# Patient Record
Sex: MALE | Race: ASIAN | NOT HISPANIC OR LATINO | Employment: FULL TIME | URBAN - METROPOLITAN AREA
[De-identification: names, ages, dates, MRNs, and addresses within clinical notes are randomized per-mention and may not be internally consistent; named-entity substitution may affect disease eponyms.]

---

## 2018-06-22 ENCOUNTER — OFFICE VISIT (OUTPATIENT)
Dept: MEDICAL GROUP | Facility: PHYSICIAN GROUP | Age: 58
End: 2018-06-22
Payer: COMMERCIAL

## 2018-06-22 VITALS
HEIGHT: 64 IN | WEIGHT: 180 LBS | SYSTOLIC BLOOD PRESSURE: 150 MMHG | DIASTOLIC BLOOD PRESSURE: 74 MMHG | OXYGEN SATURATION: 97 % | RESPIRATION RATE: 14 BRPM | TEMPERATURE: 97 F | HEART RATE: 91 BPM | BODY MASS INDEX: 30.73 KG/M2

## 2018-06-22 DIAGNOSIS — Z12.12 SCREENING FOR COLORECTAL CANCER: ICD-10-CM

## 2018-06-22 DIAGNOSIS — R07.82 INTERCOSTAL PAIN: ICD-10-CM

## 2018-06-22 DIAGNOSIS — Z12.11 SCREENING FOR COLORECTAL CANCER: ICD-10-CM

## 2018-06-22 DIAGNOSIS — I10 ESSENTIAL HYPERTENSION: ICD-10-CM

## 2018-06-22 DIAGNOSIS — E66.9 OBESITY (BMI 30-39.9): ICD-10-CM

## 2018-06-22 DIAGNOSIS — L65.9 HAIR LOSS: ICD-10-CM

## 2018-06-22 DIAGNOSIS — F17.200 SMOKER: ICD-10-CM

## 2018-06-22 PROCEDURE — 93000 ELECTROCARDIOGRAM COMPLETE: CPT | Performed by: INTERNAL MEDICINE

## 2018-06-22 PROCEDURE — 99204 OFFICE O/P NEW MOD 45 MIN: CPT | Performed by: INTERNAL MEDICINE

## 2018-06-22 RX ORDER — VERAPAMIL HYDROCHLORIDE 360 MG/1
360 CAPSULE, DELAYED RELEASE PELLETS ORAL DAILY
COMMUNITY
End: 2018-06-22

## 2018-06-22 RX ORDER — LISINOPRIL 10 MG/1
10 TABLET ORAL DAILY
Qty: 30 TAB | Refills: 3 | Status: SHIPPED | OUTPATIENT
Start: 2018-06-22 | End: 2018-09-10

## 2018-06-22 ASSESSMENT — PATIENT HEALTH QUESTIONNAIRE - PHQ9: CLINICAL INTERPRETATION OF PHQ2 SCORE: 0

## 2018-06-22 NOTE — PROGRESS NOTES
New Patient to Establish    Reason to establish: hair loss. Lab work, hypertension, chest pain     Cheikh Chacko is a 58 y.o. male here today for evaluation and management of:    Intercostal pain  Pt reports chest pain sharp 1-2/10, intermittent, in rest or during exertion. No radiation. Sometimes associated with sob. He drinks water and he feels better. He is a smoker. He takes asa for chest pain, and seems to help. He denies sweats, palpitation, n/v. This is been going on for few months. He has not seen doctor, because pain is very mild.     Hair loss  His wife reports of some patches of hair loss. There is certain area of the scalp that has more hair loss then other, but no true patches. No signs of folliculitis of skin irritation. Itchy sometimes. No dandruff .     Essential hypertension  He tells me that he has been off verapamil for 5 months. Last time he has seen physician is 4-5 years ago. He is not sure why he was placed on verapamil. He denies arhythmia problems. He was on that medication for long time.     Smoker  Counseling provided. His wife smoke too. He refuses chantix and smoking cessation class. They will try together to quit smoking.     Obesity (BMI 30-39.9)  Problem is big portion. He admits he is not very active. Counseling for a small portion, balanced diet, exercising for3-5 times per week.        Past Medical History:   Diagnosis Date   • Hyperlipidemia    • Hypertension        Current Outpatient Prescriptions   Medication Sig Dispense Refill   • lisinopril (PRINIVIL) 10 MG Tab Take 1 Tab by mouth every day. 30 Tab 3     No current facility-administered medications for this visit.        Allergies as of 06/22/2018   • (No Known Allergies)       Social History     Social History   • Marital status:      Spouse name: N/A   • Number of children: N/A   • Years of education: N/A     Occupational History   • Not on file.     Social History Main Topics   • Smoking status: Current Every Day  "Smoker     Packs/day: 1.00     Years: 20.00     Types: Cigarettes   • Smokeless tobacco: Never Used   • Alcohol use Yes      Comment: occasionally    • Drug use: No   • Sexual activity: Yes     Partners: Female      Comment: two children, and two step children      Other Topics Concern   • Not on file     Social History Narrative   • No narrative on file       Family History   Problem Relation Age of Onset   • Diabetes Sister    • Diabetes Brother    • Cancer Neg Hx    • Heart Disease Neg Hx    • Stroke Neg Hx    • Alcohol/Drug Neg Hx        History reviewed. No pertinent surgical history.    ROS: All systems reviewed are negative except for HPI    /74   Pulse 91   Temp 36.1 °C (97 °F)   Resp 14   Ht 1.626 m (5' 4\")   Wt 81.6 kg (180 lb)   SpO2 97%   BMI 30.90 kg/m²     Physical Exam  General:  Alert and oriented, No apparent distress.  Eyes: Pupils equal and reactive. No scleral icterus. EOMI  Throat: Clear no erythema or exudates noted. Oral mucosa moist, oral dental intact  Neck: Supple. No cervical or supraclavicular lymphadenopathy noted. Thyroid not enlarged.  Lungs: normal effort,  Clear to auscultation  Cardiovascular: Regular rate and rhythm. No murmurs, rubs or gallops, pulses intact   Abdomen:  Soft, +BS, no tenderness. No rebound or guarding noted. No hepato or splenomegaly   Extremities: No clubbing, cyanosis, edema.  Neuro: cranial nerves intact, sensation intact   Muscle skeletal: no back tenderness   Skin: Clear. No rash or suspicious skin lesions noted. See above     Assessment and Plan    1. Essential hypertension  I discuss with pt , there is no benefits of verapamil for hypertension. I will switch pt to lisinopril and I will bring him for one month follow up to continue monitoring. He denies symptoms of hypertension. I discuss side effects as dry cough  - COMP METABOLIC PANEL; Future  - LIPID PROFILE; Future  - MICROALBUMIN CREAT RATIO URINE; Future  - lisinopril (PRINIVIL) 10 MG " Tab; Take 1 Tab by mouth every day.  Dispense: 30 Tab; Refill: 3    2. Hair loss  Thyroid to follow. Seems more age related hair loss. No true infection. Consider dermatology referral if worse   - TSH WITH REFLEX TO FT4; Future    3. Intercostal pain  Atypical chest pain. EKG my personal reading and interpretation:  Sinus rhythm, HR 75, SD < 200 ms, QRS < 120 ms, normal axis deviation, J point elevation on anterior leads, left ventricular hypertrophy. No other significant ST changes. No active chest pain now. I advise pt to call 911 if he starts to have severe chest pain, associated with n/v, dizziness, sweats, elephant sitting on his chest, radiation to left arm   - EKG; Future  - CBC WITH DIFFERENTIAL; Future  - REFERRAL TO CARDIOLOGY    4. Smoker  I urge pt for smoking cessation. Continue to monitor  - CBC WITH DIFFERENTIAL; Future    5. Screening for colorectal cancer  - OCCULT BLOOD FECES IMMUNOASSAY (FIT); Future    6. Obesity (BMI 30-39.9)  Counseling for a small portion, balanced diet, exercising for3-5 times per week.    - Patient identified as having weight management issue.  Appropriate orders and counseling given.      Followup: Return in about 4 weeks (around 7/20/2018), or if symptoms worsen or fail to improve, for Short.    Signed by: Ashu Brandt M.D.

## 2018-06-22 NOTE — ASSESSMENT & PLAN NOTE
Problem is big portion. He admits he is not very active. Counseling for a small portion, balanced diet, exercising for3-5 times per week.

## 2018-06-22 NOTE — ASSESSMENT & PLAN NOTE
He tells me that he has been off verapamil for 5 months. Last time he has seen physician is 4-5 years ago. He is not sure why he was placed on verapamil. He denies arhythmia problems. He was on that medication for long time.

## 2018-06-22 NOTE — LETTER
[facility]  Ashu Brandt M.D.  [address]  [place] [address]  Fax: [phone]   Authorization for Release/Disclosure of   Protected Health Information   Name: CHEIKH CHACKO : 1960 SSN: [id number]   Address: [facility] [address] [place] [address] Phone:    [phone] (home)    I authorize the entity listed below to release/disclose the PHI below to:   [facility]/Ashu Brandt M.D. and Ashu Brandt M.D.   Provider or Entity Name:     Address   City, State, Zip   Phone:      Fax:     Reason for request: continuity of care   Information to be released:    [  ] LAST COLONOSCOPY,  including any PATH REPORT and follow-up  [  ] LAST FIT/COLOGUARD RESULT [  ] LAST DEXA  [  ] LAST MAMMOGRAM  [  ] LAST PAP  [  ] LAST LABS [  ] RETINA EXAM REPORT  [  ] IMMUNIZATION RECORDS  [  ] Release all info      [  ] Check here and initial the line next to each item to release ALL health information INCLUDING  _____ Care and treatment for drug and / or alcohol abuse  _____ HIV testing, infection status, or AIDS  _____ Genetic Testing    DATES OF SERVICE OR TIME PERIOD TO BE DISCLOSED: _____________  I understand and acknowledge that:  * This Authorization may be revoked at any time by you in writing, except if your health information has already been used or disclosed.  * Your health information that will be used or disclosed as a result of you signing this authorization could be re-disclosed by the recipient. If this occurs, your re-disclosed health information may no longer be protected by State or Federal laws.  * You may refuse to sign this Authorization. Your refusal will not affect your ability to obtain treatment.  * This Authorization becomes effective upon signing and will  on (date) __________.      If no date is indicated, this Authorization will  one (1) year from the signature date.    Name: Cheikh Chacko    Signature:   Date:     2018       PLEASE FAX REQUESTED RECORDS BACK TO: [phone]  [phone]

## 2018-06-22 NOTE — ASSESSMENT & PLAN NOTE
Counseling provided. His wife smoke too. He refuses chantix and smoking cessation class. They will try together to quit smoking.

## 2018-06-22 NOTE — ASSESSMENT & PLAN NOTE
Pt reports chest pain sharp 1-2/10, intermittent, in rest or during exertion. No radiation. Sometimes associated with sob. He drinks water and he feels better. He is a smoker. He takes asa for chest pain, and seems to help. He denies sweats, palpitation, n/v. This is been going on for few months. He has not seen doctor, because pain is very mild.

## 2018-06-22 NOTE — ASSESSMENT & PLAN NOTE
His wife reports of some patches of hair loss. There is certain area of the scalp that has more hair loss then other, but no true patches. No signs of folliculitis of skin irritation. Itchy sometimes. No dandruff .

## 2018-07-19 ENCOUNTER — HOSPITAL ENCOUNTER (OUTPATIENT)
Dept: LAB | Facility: MEDICAL CENTER | Age: 58
End: 2018-07-19
Attending: INTERNAL MEDICINE
Payer: COMMERCIAL

## 2018-07-19 DIAGNOSIS — I10 ESSENTIAL HYPERTENSION: ICD-10-CM

## 2018-07-19 DIAGNOSIS — L65.9 HAIR LOSS: ICD-10-CM

## 2018-07-19 DIAGNOSIS — F17.200 SMOKER: ICD-10-CM

## 2018-07-19 DIAGNOSIS — R07.82 INTERCOSTAL PAIN: ICD-10-CM

## 2018-07-19 LAB
ALBUMIN SERPL BCP-MCNC: 4.8 G/DL (ref 3.2–4.9)
ALBUMIN/GLOB SERPL: 1.7 G/DL
ALP SERPL-CCNC: 49 U/L (ref 30–99)
ALT SERPL-CCNC: 35 U/L (ref 2–50)
ANION GAP SERPL CALC-SCNC: 9 MMOL/L (ref 0–11.9)
AST SERPL-CCNC: 26 U/L (ref 12–45)
BASOPHILS # BLD AUTO: 1.3 % (ref 0–1.8)
BASOPHILS # BLD: 0.09 K/UL (ref 0–0.12)
BILIRUB SERPL-MCNC: 0.9 MG/DL (ref 0.1–1.5)
BUN SERPL-MCNC: 18 MG/DL (ref 8–22)
CALCIUM SERPL-MCNC: 9.6 MG/DL (ref 8.5–10.5)
CHLORIDE SERPL-SCNC: 107 MMOL/L (ref 96–112)
CHOLEST SERPL-MCNC: 169 MG/DL (ref 100–199)
CO2 SERPL-SCNC: 24 MMOL/L (ref 20–33)
CREAT SERPL-MCNC: 1.07 MG/DL (ref 0.5–1.4)
CREAT UR-MCNC: 290.9 MG/DL
EOSINOPHIL # BLD AUTO: 0.19 K/UL (ref 0–0.51)
EOSINOPHIL NFR BLD: 2.8 % (ref 0–6.9)
ERYTHROCYTE [DISTWIDTH] IN BLOOD BY AUTOMATED COUNT: 41.2 FL (ref 35.9–50)
GLOBULIN SER CALC-MCNC: 2.9 G/DL (ref 1.9–3.5)
GLUCOSE SERPL-MCNC: 110 MG/DL (ref 65–99)
HCT VFR BLD AUTO: 48.2 % (ref 42–52)
HDLC SERPL-MCNC: 45 MG/DL
HGB BLD-MCNC: 16.7 G/DL (ref 14–18)
IMM GRANULOCYTES # BLD AUTO: 0.01 K/UL (ref 0–0.11)
IMM GRANULOCYTES NFR BLD AUTO: 0.1 % (ref 0–0.9)
LDLC SERPL CALC-MCNC: 98 MG/DL
LYMPHOCYTES # BLD AUTO: 2.57 K/UL (ref 1–4.8)
LYMPHOCYTES NFR BLD: 37.8 % (ref 22–41)
MCH RBC QN AUTO: 32.5 PG (ref 27–33)
MCHC RBC AUTO-ENTMCNC: 34.6 G/DL (ref 33.7–35.3)
MCV RBC AUTO: 93.8 FL (ref 81.4–97.8)
MICROALBUMIN UR-MCNC: 1.9 MG/DL
MICROALBUMIN/CREAT UR: 7 MG/G (ref 0–30)
MONOCYTES # BLD AUTO: 0.76 K/UL (ref 0–0.85)
MONOCYTES NFR BLD AUTO: 11.2 % (ref 0–13.4)
NEUTROPHILS # BLD AUTO: 3.18 K/UL (ref 1.82–7.42)
NEUTROPHILS NFR BLD: 46.8 % (ref 44–72)
NRBC # BLD AUTO: 0 K/UL
NRBC BLD-RTO: 0 /100 WBC
PLATELET # BLD AUTO: 195 K/UL (ref 164–446)
PMV BLD AUTO: 10.4 FL (ref 9–12.9)
POTASSIUM SERPL-SCNC: 4 MMOL/L (ref 3.6–5.5)
PROT SERPL-MCNC: 7.7 G/DL (ref 6–8.2)
RBC # BLD AUTO: 5.14 M/UL (ref 4.7–6.1)
SODIUM SERPL-SCNC: 140 MMOL/L (ref 135–145)
TRIGL SERPL-MCNC: 129 MG/DL (ref 0–149)
TSH SERPL DL<=0.005 MIU/L-ACNC: 2.42 UIU/ML (ref 0.38–5.33)
WBC # BLD AUTO: 6.8 K/UL (ref 4.8–10.8)

## 2018-07-19 PROCEDURE — 82043 UR ALBUMIN QUANTITATIVE: CPT

## 2018-07-19 PROCEDURE — 80053 COMPREHEN METABOLIC PANEL: CPT

## 2018-07-19 PROCEDURE — 82570 ASSAY OF URINE CREATININE: CPT

## 2018-07-19 PROCEDURE — 36415 COLL VENOUS BLD VENIPUNCTURE: CPT

## 2018-07-19 PROCEDURE — 84443 ASSAY THYROID STIM HORMONE: CPT

## 2018-07-19 PROCEDURE — 80061 LIPID PANEL: CPT

## 2018-07-19 PROCEDURE — 85025 COMPLETE CBC W/AUTO DIFF WBC: CPT

## 2018-07-20 NOTE — PROGRESS NOTES
Eddy Salamanca,   I just wanted to let you know that the labs look good. We can discuss at your next appointment.   Best,  Ashu Brandt M.D.

## 2018-07-21 ENCOUNTER — HOSPITAL ENCOUNTER (OUTPATIENT)
Facility: MEDICAL CENTER | Age: 58
End: 2018-07-21
Attending: INTERNAL MEDICINE
Payer: COMMERCIAL

## 2018-07-21 PROCEDURE — 82274 ASSAY TEST FOR BLOOD FECAL: CPT

## 2018-07-26 ENCOUNTER — APPOINTMENT (OUTPATIENT)
Dept: MEDICAL GROUP | Facility: PHYSICIAN GROUP | Age: 58
End: 2018-07-26
Payer: COMMERCIAL

## 2018-07-29 DIAGNOSIS — Z12.11 SCREENING FOR COLORECTAL CANCER: ICD-10-CM

## 2018-07-29 DIAGNOSIS — Z12.12 SCREENING FOR COLORECTAL CANCER: ICD-10-CM

## 2018-07-30 LAB — HEMOCCULT STL QL IA: NEGATIVE

## 2018-09-10 ENCOUNTER — OFFICE VISIT (OUTPATIENT)
Dept: MEDICAL GROUP | Facility: PHYSICIAN GROUP | Age: 58
End: 2018-09-10
Payer: COMMERCIAL

## 2018-09-10 VITALS
RESPIRATION RATE: 14 BRPM | DIASTOLIC BLOOD PRESSURE: 86 MMHG | OXYGEN SATURATION: 96 % | SYSTOLIC BLOOD PRESSURE: 152 MMHG | BODY MASS INDEX: 31.24 KG/M2 | HEIGHT: 64 IN | TEMPERATURE: 97.6 F | WEIGHT: 183 LBS | HEART RATE: 90 BPM

## 2018-09-10 DIAGNOSIS — Z23 NEED FOR VACCINATION: ICD-10-CM

## 2018-09-10 DIAGNOSIS — F17.200 SMOKER: ICD-10-CM

## 2018-09-10 DIAGNOSIS — R06.83 SNORING: ICD-10-CM

## 2018-09-10 DIAGNOSIS — I10 ESSENTIAL HYPERTENSION: ICD-10-CM

## 2018-09-10 DIAGNOSIS — L65.9 HAIR LOSS: ICD-10-CM

## 2018-09-10 DIAGNOSIS — E66.9 OBESITY (BMI 30-39.9): ICD-10-CM

## 2018-09-10 PROBLEM — R07.82 INTERCOSTAL PAIN: Status: RESOLVED | Noted: 2018-06-22 | Resolved: 2018-09-10

## 2018-09-10 PROCEDURE — 90472 IMMUNIZATION ADMIN EACH ADD: CPT | Performed by: INTERNAL MEDICINE

## 2018-09-10 PROCEDURE — 99214 OFFICE O/P EST MOD 30 MIN: CPT | Mod: 25 | Performed by: INTERNAL MEDICINE

## 2018-09-10 PROCEDURE — 90471 IMMUNIZATION ADMIN: CPT | Performed by: INTERNAL MEDICINE

## 2018-09-10 PROCEDURE — 90686 IIV4 VACC NO PRSV 0.5 ML IM: CPT | Performed by: INTERNAL MEDICINE

## 2018-09-10 PROCEDURE — 90732 PPSV23 VACC 2 YRS+ SUBQ/IM: CPT | Performed by: INTERNAL MEDICINE

## 2018-09-10 RX ORDER — KETOCONAZOLE 20 MG/ML
SHAMPOO TOPICAL
Qty: 1 BOTTLE | Refills: 0 | Status: SHIPPED | OUTPATIENT
Start: 2018-09-10 | End: 2019-01-15

## 2018-09-10 RX ORDER — LOSARTAN POTASSIUM AND HYDROCHLOROTHIAZIDE 12.5; 1 MG/1; MG/1
1 TABLET ORAL DAILY
Qty: 30 TAB | Refills: 3 | Status: SHIPPED | OUTPATIENT
Start: 2018-09-10 | End: 2018-10-31 | Stop reason: SDUPTHER

## 2018-09-10 NOTE — PROGRESS NOTES
Subjective:   Cheikh Chacko is a 58 y.o. male here today for hypertension, lab work review, snoring    Essential hypertension  BP elevated. I did recheck Bp and it was 160/90. He is on lisinopril which is causing him dry cough, but he is taking medication. He denies chest pain,sob, leg swelling, palpitation, blurred vision, lightheadedness, headache.     Hair loss  Slight better. Patches with hair loss. No erythema. Some hair is growing again. There is some white dry skin as, dandruff. I will try ketoconazole shampoo for trial. TSH normal      Obesity (BMI 30-39.9)  Counseling for a small portion, balanced diet, exercising for3-5 times per week.      Smoker  He is trying to cut on smoking. Counseling provided     Snoring  His wife reports loud snoring, and sometimes he stop breathing. He feels tired in the morning, easily naps during the day. His wife is concerned that he has JOSE. Mallampati score 3        Current medicines (including changes today)  Current Outpatient Prescriptions   Medication Sig Dispense Refill   • ketoconazole (NIZORAL) 2 % shampoo Apply to scalp every time washing hair 1 Bottle 0   • losartan-hydrochlorothiazide (HYZAAR) 100-12.5 MG per tablet Take 1 Tab by mouth every day. 30 Tab 3     No current facility-administered medications for this visit.      He  has a past medical history of Hyperlipidemia and Hypertension.    Current Outpatient Prescriptions   Medication Sig Dispense Refill   • ketoconazole (NIZORAL) 2 % shampoo Apply to scalp every time washing hair 1 Bottle 0   • losartan-hydrochlorothiazide (HYZAAR) 100-12.5 MG per tablet Take 1 Tab by mouth every day. 30 Tab 3     No current facility-administered medications for this visit.        Allergies as of 09/10/2018   • (No Known Allergies)       Social History     Social History   • Marital status:      Spouse name: N/A   • Number of children: N/A   • Years of education: N/A     Occupational History   • Not on file.     Social  "History Main Topics   • Smoking status: Current Every Day Smoker     Packs/day: 1.00     Years: 20.00     Types: Cigarettes   • Smokeless tobacco: Never Used   • Alcohol use Yes      Comment: occasionally    • Drug use: No   • Sexual activity: Yes     Partners: Female      Comment: two children, and two step children      Other Topics Concern   • Not on file     Social History Narrative   • No narrative on file        Family History   Problem Relation Age of Onset   • Diabetes Sister    • Diabetes Brother    • Cancer Neg Hx    • Heart Disease Neg Hx    • Stroke Neg Hx    • Alcohol/Drug Neg Hx        History reviewed. No pertinent surgical history.    ROS   All systems reviewed are negative except for HPI       Objective:     Blood pressure 152/86, pulse 90, temperature 36.4 °C (97.6 °F), resp. rate 14, height 1.626 m (5' 4\"), weight 83 kg (183 lb), SpO2 96 %. Body mass index is 31.41 kg/m².   Physical Exam:  Constitutional: Alert, no distress.  Skin: Warm, dry, good turgor, no rashes in visible areas.  Eye: Equal, round and reactive, conjunctiva clear, lids normal.  ENMT: Lips without lesions, good dentition, oropharynx clear.  Neck: Trachea midline, no masses, no thyromegaly. No cervical or supraclavicular lymphadenopathy  Respiratory: Unlabored respiratory effort, lungs clear to auscultation, no wheezes, no ronchi.  Cardiovascular: Normal S1, S2, no murmur, no edema.  Abdomen: Soft, non-tender, no masses, no hepatosplenomegaly.  Psych: Alert and oriented x3, normal affect and mood.        Assessment and Plan:   The following treatment plan was discussed    1. Need for vaccination  - INFLUENZA VACCINE QUAD INJ >3Y(PF)  - PNEUMOCOCCAL POLYSACCHARIDE VACCINE 23-VALENT =>1YO SQ/IM    2. Essential hypertension  We will switch patient to losartan combination with hydrochlorothiazide.  He is having dry cough from lisinopril.  Advised patient to continue monitoring.  There is no need to send patient to the emergency room " at this point.  Reevaluate in 1 month.  Sleep studies to follow-up which may be complement reason why his blood pressure is uncontrolled.  - losartan-hydrochlorothiazide (HYZAAR) 100-12.5 MG per tablet; Take 1 Tab by mouth every day.  Dispense: 30 Tab; Refill: 3    3. Hair loss  Ketoconazole shampoo trial   - ketoconazole (NIZORAL) 2 % shampoo; Apply to scalp every time washing hair  Dispense: 1 Bottle; Refill: 0    4. Smoker  Counseling provided. Pt is working on cessation     5. Obesity (BMI 30-39.9)  Counseling for a small portion, balanced diet, exercising for3-5 times per week.    6. Snoring  Sleep studies to follow   - REFERRAL TO SLEEP STUDIES      Followup: Return in about 4 weeks (around 10/8/2018), or if symptoms worsen or fail to improve, for Short.

## 2018-09-18 ENCOUNTER — OFFICE VISIT (OUTPATIENT)
Dept: CARDIOLOGY | Facility: MEDICAL CENTER | Age: 58
End: 2018-09-18
Payer: COMMERCIAL

## 2018-09-18 VITALS
OXYGEN SATURATION: 96 % | SYSTOLIC BLOOD PRESSURE: 132 MMHG | WEIGHT: 179 LBS | BODY MASS INDEX: 30.56 KG/M2 | HEIGHT: 64 IN | DIASTOLIC BLOOD PRESSURE: 84 MMHG | HEART RATE: 90 BPM

## 2018-09-18 DIAGNOSIS — Z79.899 HIGH RISK MEDICATION USE: ICD-10-CM

## 2018-09-18 DIAGNOSIS — I10 HTN (HYPERTENSION), MALIGNANT: ICD-10-CM

## 2018-09-18 LAB — EKG IMPRESSION: NORMAL

## 2018-09-18 PROCEDURE — 99214 OFFICE O/P EST MOD 30 MIN: CPT | Performed by: INTERNAL MEDICINE

## 2018-09-18 PROCEDURE — 93000 ELECTROCARDIOGRAM COMPLETE: CPT | Performed by: INTERNAL MEDICINE

## 2018-09-18 ASSESSMENT — ENCOUNTER SYMPTOMS
PALPITATIONS: 0
BLURRED VISION: 0
SENSORY CHANGE: 0
FEVER: 0
EYE PAIN: 0
NAUSEA: 0
BLOOD IN STOOL: 0
SHORTNESS OF BREATH: 0
MYALGIAS: 0
HALLUCINATIONS: 0
ORTHOPNEA: 0
DIZZINESS: 0
DOUBLE VISION: 0
FALLS: 0
SPEECH CHANGE: 0
CLAUDICATION: 0
WEIGHT LOSS: 0
LOSS OF CONSCIOUSNESS: 0
VOMITING: 0
EYE DISCHARGE: 0
ABDOMINAL PAIN: 0
DEPRESSION: 0
COUGH: 0
HEADACHES: 0
PND: 0
BRUISES/BLEEDS EASILY: 0
CHILLS: 0

## 2018-09-18 NOTE — LETTER
[facility]   [address]   [address]   [place] [address]  Phone: [phone]  Fax: [phone]              Cheikh Chacko  1960    Encounter Date: 9/18/2018    Donald Rondon M.D.          PROGRESS NOTE:  Chief Complaint   Patient presents with   • HTN (Controlled)   • Chest Pain       Subjective:   Cheikh Chacko is a 58 y.o. male who presents today for cardiac care and evaluation due to abnormal EKG, HTN.    Cheikh Chacko does not have any history of heart attack arrhythmias in the past. he never had transthoracic echocardiogram, cardiac catheterization or ablations procedure in the past. At this time, he denies having chest pain shortness of breath presyncopal syncopal episodes. he is able to exercise with walking for one to 2 miles without having problems of chest pain or shortness of breath. Patient is also able to climb up at least 2 flights of stairs without having symptoms.    I have independently reviewed patient's ECG with patient in clinic today, which shows normal sinus rhythm, normal MT, QT intervals. No evidence of acute coronary syndrome.    Past Medical History:   Diagnosis Date   • Hyperlipidemia    • Hypertension      History reviewed. No pertinent surgical history.  Family History   Problem Relation Age of Onset   • Diabetes Sister    • Diabetes Brother    • Cancer Neg Hx    • Heart Disease Neg Hx    • Stroke Neg Hx    • Alcohol/Drug Neg Hx      Social History     Social History   • Marital status:      Spouse name: N/A   • Number of children: N/A   • Years of education: N/A     Occupational History   • Not on file.     Social History Main Topics   • Smoking status: Current Every Day Smoker     Packs/day: 1.00     Years: 20.00     Types: Cigarettes   • Smokeless tobacco: Never Used   • Alcohol use Yes      Comment: occasionally    • Drug use: No   • Sexual activity: Yes     Partners: Female      Comment: two  "children, and two step children      Other Topics Concern   • Not on file     Social History Narrative   • No narrative on file     No Known Allergies  Outpatient Encounter Prescriptions as of 9/18/2018   Medication Sig Dispense Refill   • ketoconazole (NIZORAL) 2 % shampoo Apply to scalp every time washing hair 1 Bottle 0   • losartan-hydrochlorothiazide (HYZAAR) 100-12.5 MG per tablet Take 1 Tab by mouth every day. 30 Tab 3     No facility-administered encounter medications on file as of 9/18/2018.      Review of Systems   Constitutional: Negative for chills, fever, malaise/fatigue and weight loss.   HENT: Negative for ear discharge, ear pain, hearing loss and nosebleeds.    Eyes: Negative for blurred vision, double vision, pain and discharge.   Respiratory: Negative for cough and shortness of breath.    Cardiovascular: Negative for chest pain, palpitations, orthopnea, claudication, leg swelling and PND.   Gastrointestinal: Negative for abdominal pain, blood in stool, melena, nausea and vomiting.   Genitourinary: Negative for dysuria and hematuria.   Musculoskeletal: Negative for falls, joint pain and myalgias.   Skin: Negative for itching and rash.   Neurological: Negative for dizziness, sensory change, speech change, loss of consciousness and headaches.   Endo/Heme/Allergies: Negative for environmental allergies. Does not bruise/bleed easily.   Psychiatric/Behavioral: Negative for depression, hallucinations and suicidal ideas.        Objective:   /84   Pulse 90   Ht 1.626 m (5' 4\")   Wt 81.2 kg (179 lb)   SpO2 96%   BMI 30.73 kg/m²      Physical Exam   Constitutional: He is oriented to person, place, and time. No distress.   HENT:   Head: Normocephalic and atraumatic.   Right Ear: External ear normal.   Left Ear: External ear normal.   Eyes: Right eye exhibits no discharge. Left eye exhibits no discharge.   Neck: No JVD present. No thyromegaly present.   Cardiovascular: Normal rate, regular rhythm, " normal heart sounds and intact distal pulses.  Exam reveals no gallop and no friction rub.    No murmur heard.  Pulmonary/Chest: Breath sounds normal. No respiratory distress.   Abdominal: Bowel sounds are normal. He exhibits no distension. There is no tenderness.   Musculoskeletal: He exhibits no edema or tenderness.   Neurological: He is alert and oriented to person, place, and time. No cranial nerve deficit.   Skin: Skin is warm and dry. He is not diaphoretic.   Psychiatric: He has a normal mood and affect. His behavior is normal.   Nursing note and vitals reviewed.      Assessment:     1. HTN (hypertension), malignant  ECHOCARDIOGRAM COMP W/O CONT   2. High risk medication use  ECHOCARDIOGRAM COMP W/O CONT       Medical Decision Making:  Today's Assessment / Status / Plan:   Blood pressure is well controlled.  Cont current medications at current dose.   I will order transthoracic echocardiogram to assess for structural abnormalities.    I will see patient back in clinic with lab tests and studies results in 6 months.    I thank you Dr. Brandt for referring patient to our Cardiology Clinic today.        Ashu Brandt M.D.  [address]  [place] [address]  VIA In Basket

## 2018-09-18 NOTE — PROGRESS NOTES
Chief Complaint   Patient presents with   • HTN (Controlled)   • Chest Pain       Subjective:   Cheikh Chacko is a 58 y.o. male who presents today for cardiac care and evaluation due to abnormal EKG, HTN.    Cheikh Chacko does not have any history of heart attack arrhythmias in the past. he never had transthoracic echocardiogram, cardiac catheterization or ablations procedure in the past. At this time, he denies having chest pain shortness of breath presyncopal syncopal episodes. he is able to exercise with walking for one to 2 miles without having problems of chest pain or shortness of breath. Patient is also able to climb up at least 2 flights of stairs without having symptoms.    I have independently reviewed patient's ECG with patient in clinic today, which shows normal sinus rhythm, normal CA, QT intervals. No evidence of acute coronary syndrome.    Past Medical History:   Diagnosis Date   • Hyperlipidemia    • Hypertension      History reviewed. No pertinent surgical history.  Family History   Problem Relation Age of Onset   • Diabetes Sister    • Diabetes Brother    • Cancer Neg Hx    • Heart Disease Neg Hx    • Stroke Neg Hx    • Alcohol/Drug Neg Hx      Social History     Social History   • Marital status:      Spouse name: N/A   • Number of children: N/A   • Years of education: N/A     Occupational History   • Not on file.     Social History Main Topics   • Smoking status: Current Every Day Smoker     Packs/day: 1.00     Years: 20.00     Types: Cigarettes   • Smokeless tobacco: Never Used   • Alcohol use Yes      Comment: occasionally    • Drug use: No   • Sexual activity: Yes     Partners: Female      Comment: two children, and two step children      Other Topics Concern   • Not on file     Social History Narrative   • No narrative on file     No Known Allergies  Outpatient Encounter Prescriptions as of 9/18/2018   Medication Sig Dispense Refill   • ketoconazole (NIZORAL) 2 % shampoo Apply to scalp  "every time washing hair 1 Bottle 0   • losartan-hydrochlorothiazide (HYZAAR) 100-12.5 MG per tablet Take 1 Tab by mouth every day. 30 Tab 3     No facility-administered encounter medications on file as of 9/18/2018.      Review of Systems   Constitutional: Negative for chills, fever, malaise/fatigue and weight loss.   HENT: Negative for ear discharge, ear pain, hearing loss and nosebleeds.    Eyes: Negative for blurred vision, double vision, pain and discharge.   Respiratory: Negative for cough and shortness of breath.    Cardiovascular: Negative for chest pain, palpitations, orthopnea, claudication, leg swelling and PND.   Gastrointestinal: Negative for abdominal pain, blood in stool, melena, nausea and vomiting.   Genitourinary: Negative for dysuria and hematuria.   Musculoskeletal: Negative for falls, joint pain and myalgias.   Skin: Negative for itching and rash.   Neurological: Negative for dizziness, sensory change, speech change, loss of consciousness and headaches.   Endo/Heme/Allergies: Negative for environmental allergies. Does not bruise/bleed easily.   Psychiatric/Behavioral: Negative for depression, hallucinations and suicidal ideas.        Objective:   /84   Pulse 90   Ht 1.626 m (5' 4\")   Wt 81.2 kg (179 lb)   SpO2 96%   BMI 30.73 kg/m²     Physical Exam   Constitutional: He is oriented to person, place, and time. No distress.   HENT:   Head: Normocephalic and atraumatic.   Right Ear: External ear normal.   Left Ear: External ear normal.   Eyes: Right eye exhibits no discharge. Left eye exhibits no discharge.   Neck: No JVD present. No thyromegaly present.   Cardiovascular: Normal rate, regular rhythm, normal heart sounds and intact distal pulses.  Exam reveals no gallop and no friction rub.    No murmur heard.  Pulmonary/Chest: Breath sounds normal. No respiratory distress.   Abdominal: Bowel sounds are normal. He exhibits no distension. There is no tenderness.   Musculoskeletal: He " exhibits no edema or tenderness.   Neurological: He is alert and oriented to person, place, and time. No cranial nerve deficit.   Skin: Skin is warm and dry. He is not diaphoretic.   Psychiatric: He has a normal mood and affect. His behavior is normal.   Nursing note and vitals reviewed.      Assessment:     1. HTN (hypertension), malignant  ECHOCARDIOGRAM COMP W/O CONT   2. High risk medication use  ECHOCARDIOGRAM COMP W/O CONT       Medical Decision Making:  Today's Assessment / Status / Plan:   Blood pressure is well controlled.  Cont current medications at current dose.   I will order transthoracic echocardiogram to assess for structural abnormalities.    I will see patient back in clinic with lab tests and studies results in 6 months.    I thank you Dr. Brandt for referring patient to our Cardiology Clinic today.

## 2018-10-09 ENCOUNTER — APPOINTMENT (OUTPATIENT)
Dept: MEDICAL GROUP | Facility: PHYSICIAN GROUP | Age: 58
End: 2018-10-09
Payer: COMMERCIAL

## 2018-10-31 DIAGNOSIS — I10 ESSENTIAL HYPERTENSION: ICD-10-CM

## 2018-10-31 RX ORDER — LOSARTAN POTASSIUM AND HYDROCHLOROTHIAZIDE 12.5; 1 MG/1; MG/1
1 TABLET ORAL DAILY
Qty: 30 TAB | Refills: 3 | Status: SHIPPED | OUTPATIENT
Start: 2018-10-31 | End: 2018-11-09 | Stop reason: SDUPTHER

## 2018-11-09 DIAGNOSIS — I10 ESSENTIAL HYPERTENSION: ICD-10-CM

## 2018-11-09 RX ORDER — LOSARTAN POTASSIUM AND HYDROCHLOROTHIAZIDE 12.5; 1 MG/1; MG/1
1 TABLET ORAL DAILY
Qty: 90 TAB | Refills: 3 | Status: SHIPPED | OUTPATIENT
Start: 2018-11-09 | End: 2019-01-15

## 2019-01-03 ENCOUNTER — APPOINTMENT (OUTPATIENT)
Dept: MEDICAL GROUP | Facility: PHYSICIAN GROUP | Age: 59
End: 2019-01-03
Payer: COMMERCIAL

## 2019-01-15 ENCOUNTER — OFFICE VISIT (OUTPATIENT)
Dept: MEDICAL GROUP | Facility: PHYSICIAN GROUP | Age: 59
End: 2019-01-15
Payer: COMMERCIAL

## 2019-01-15 VITALS
HEIGHT: 64 IN | DIASTOLIC BLOOD PRESSURE: 76 MMHG | OXYGEN SATURATION: 98 % | TEMPERATURE: 98.2 F | RESPIRATION RATE: 14 BRPM | SYSTOLIC BLOOD PRESSURE: 144 MMHG | HEART RATE: 86 BPM | WEIGHT: 181 LBS | BODY MASS INDEX: 30.9 KG/M2

## 2019-01-15 DIAGNOSIS — R06.83 SNORING: ICD-10-CM

## 2019-01-15 DIAGNOSIS — E66.9 OBESITY (BMI 30-39.9): ICD-10-CM

## 2019-01-15 DIAGNOSIS — F17.200 SMOKER: ICD-10-CM

## 2019-01-15 DIAGNOSIS — I10 ESSENTIAL HYPERTENSION: ICD-10-CM

## 2019-01-15 PROBLEM — L65.9 HAIR LOSS: Status: RESOLVED | Noted: 2018-06-22 | Resolved: 2019-01-15

## 2019-01-15 PROCEDURE — 99214 OFFICE O/P EST MOD 30 MIN: CPT | Performed by: INTERNAL MEDICINE

## 2019-01-15 RX ORDER — LOSARTAN POTASSIUM AND HYDROCHLOROTHIAZIDE 25; 100 MG/1; MG/1
1 TABLET ORAL DAILY
Qty: 90 TAB | Refills: 3 | Status: SHIPPED | OUTPATIENT
Start: 2019-01-15 | End: 2019-04-29 | Stop reason: SDUPTHER

## 2019-01-15 ASSESSMENT — PATIENT HEALTH QUESTIONNAIRE - PHQ9: CLINICAL INTERPRETATION OF PHQ2 SCORE: 0

## 2019-01-16 NOTE — ASSESSMENT & PLAN NOTE
Slight elevated. He is on losartan/HCTZ 100-12.5.  He is not checking blood pressure at home.  He denies chest pain, shortness of breath, leg swelling, blurry vision or lightheadedness. I did personally check BP and it was 140/88

## 2019-01-16 NOTE — ASSESSMENT & PLAN NOTE
Trying to quit. Counseling provided.  Denies chronic cough, shortness of breath, wheezing, chest pain

## 2019-01-16 NOTE — PROGRESS NOTES
Subjective:   Cheikh Chacko is a 58 y.o. male here today for hypertension, chronic medical    Essential hypertension  Slight elevated. He is on losartan/HCTZ 100-12.5.  He is not checking blood pressure at home.  He denies chest pain, shortness of breath, leg swelling, blurry vision or lightheadedness. I did personally check BP and it was 140/88    Smoker  Trying to quit. Counseling provided.  Denies chronic cough, shortness of breath, wheezing, chest pain    Obesity (BMI 30-39.9)  Counseling for a small portion, balanced diet, exercising for3-5 times per week.  '    Snoring  He is scheduled for sleep studies this week. Review at next apt        Current medicines (including changes today)  Current Outpatient Prescriptions   Medication Sig Dispense Refill   • losartan-hydrochlorothiazide (HYZAAR) 100-25 MG per tablet Take 1 Tab by mouth every day. 90 Tab 3     No current facility-administered medications for this visit.      He  has a past medical history of Hyperlipidemia and Hypertension.    Current Outpatient Prescriptions   Medication Sig Dispense Refill   • losartan-hydrochlorothiazide (HYZAAR) 100-25 MG per tablet Take 1 Tab by mouth every day. 90 Tab 3     No current facility-administered medications for this visit.        Allergies as of 01/15/2019   • (No Known Allergies)       Social History     Social History   • Marital status:      Spouse name: N/A   • Number of children: N/A   • Years of education: N/A     Occupational History   • Not on file.     Social History Main Topics   • Smoking status: Current Every Day Smoker     Packs/day: 1.00     Years: 20.00     Types: Cigarettes   • Smokeless tobacco: Never Used   • Alcohol use Yes      Comment: occasionally    • Drug use: No   • Sexual activity: Yes     Partners: Female      Comment: two children, and two step children      Other Topics Concern   • Not on file     Social History Narrative   • No narrative on file        Family History   Problem  "Relation Age of Onset   • Diabetes Sister    • Diabetes Brother    • Cancer Neg Hx    • Heart Disease Neg Hx    • Stroke Neg Hx    • Alcohol/Drug Neg Hx        No past surgical history on file.    ROS   All systems reviewed are negative except for HPI         Objective:     Blood pressure 144/76, pulse 86, temperature 36.8 °C (98.2 °F), temperature source Temporal, resp. rate 14, height 1.626 m (5' 4\"), weight 82.1 kg (181 lb), SpO2 98 %. Body mass index is 31.07 kg/m².   Physical Exam:  Constitutional: Alert, no distress.  Skin: Warm, dry, good turgor, no rashes in visible areas.  Eye: Equal, round and reactive, conjunctiva clear, lids normal.  ENMT: Lips without lesions, good dentition, oropharynx clear.  Neck: Trachea midline, no masses, no thyromegaly. No cervical or supraclavicular lymphadenopathy  Respiratory: Unlabored respiratory effort, lungs clear to auscultation, no wheezes, no ronchi.  Cardiovascular: Normal S1, S2, no murmur, no edema.  Abdomen: Soft, non-tender, no masses, no hepatosplenomegaly.  Psych: Alert and oriented x3, normal affect and mood.        Assessment and Plan:   The following treatment plan was discussed    1. Essential hypertension  I will include the increase hydrochlorothiazide to 25 mg daily.  Continue losartan 100 mg daily.  Reviewed lab work at next appointment.  Consider amlodipine 5 mg next appointment if blood pressure still elevated.  - losartan-hydrochlorothiazide (HYZAAR) 100-25 MG per tablet; Take 1 Tab by mouth every day.  Dispense: 90 Tab; Refill: 3    2. Smoker  Counseling provided     3. Obesity (BMI 30-39.9)  Counseling for a small portion, balanced diet, exercising for3-5 times per week.    4. Snoring  Follow up with sleep studies       Followup: Return in about 4 weeks (around 2/12/2019), or if symptoms worsen or fail to improve, for Short.         "

## 2019-01-25 PROBLEM — G47.33 OSA (OBSTRUCTIVE SLEEP APNEA): Status: ACTIVE | Noted: 2019-01-25

## 2019-01-25 NOTE — PROGRESS NOTES
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 for evaluation of possible obstructive sleep apnea.  Patient relates that he hadprior sleep that he is done x3, was diagnosed to have sleep apnea, but did not pursue treatment because of the high deductible.  He believes that he stopped breathing approximately 50 times possibly in the whole night but possibly per hour.    The patient briefly describes his sleep problem as \"snoring\" which began in his 30s in which she considers moderate in severity.  The patient has a regular bed partner.  He estimates his sleep latency to be about 30 minutes.  He usually goes to bed at 10 PM to 11 PM and gets up at 6 AM to 8 AM.  He may watch TV just prior to turn out the lights and attempting to go to sleep.  On the average she wakes up 1-2 times during the night and experiences one to 3 episodes of nocturia.  After really bad nights of snoring he has a severe sore throat.    His symptoms include sleepiness during the day, nonrestorative sleep, loud and disturbing snoring, leg twitching, teeth grinding, and unusual movements during sleep.  He may fall asleep accidentally when watching TV or reading a book.    Significant comorbidities and modifying factors include obesity, hypertension, tobacco abuse, and up-to-date with influenza vaccination.    Patient Active Problem List    Diagnosis Date Noted   • JOSE (obstructive sleep apnea) 01/25/2019   • Snoring 09/10/2018   • Essential hypertension 06/22/2018   • Smoker 06/22/2018   • Obesity (BMI 30-39.9) 06/22/2018       Past Medical History:   Diagnosis Date   • Hyperlipidemia    • Hypertension    • Sleep apnea         History reviewed. No pertinent surgical history.    Family History   Problem Relation Age of Onset   • Diabetes Sister    • Diabetes Brother    • Cancer Neg Hx    • Heart Disease Neg Hx    • Stroke Neg Hx    • Alcohol/Drug Neg Hx        Social History     Social " "History   • Marital status:      Spouse name: N/A   • Number of children: N/A   • Years of education: N/A     Occupational History   • Not on file.     Social History Main Topics   • Smoking status: Current Every Day Smoker     Packs/day: 1.00     Years: 30.00     Types: Cigarettes   • Smokeless tobacco: Never Used   • Alcohol use No   • Drug use: No   • Sexual activity: Yes     Partners: Female      Comment: two children, and two step children      Other Topics Concern   • Not on file     Social History Narrative   • No narrative on file       Current Outpatient Prescriptions   Medication Sig Dispense Refill   • losartan-hydrochlorothiazide (HYZAAR) 100-25 MG per tablet Take 1 Tab by mouth every day. 90 Tab 3     No current facility-administered medications for this visit.     \"CURRENT RX\"    ALLERGIES: Patient has no known allergies.    ROS    Constitutional: Denies fever, chills, sweats,  weight loss, fatigue.  Eyes: Denies vision loss, pain, drainage, double vision, wears glasses.  Ears/Nose/Mouth/Throat: Denies earache, difficulty hearing, rhinitis/nasal congestion, injury, recurrent sore throat, persistent hoarseness, decayed teeth/toothaches, ringing or buzzing in the ears.  Cardiovascular: Denies chest pain, tightness, palpitations, swelling in legs/feet, fainting, difficulty breathing when lying down but gets better when sitting up.   Respiratory: Denies shortness of breath, cough, sputum, wheezing, painful breathing, coughing up blood.   Sleep: per HPI  Gastrointestinal: Denies  difficulty swallowing, nausea, abdominal pain, diarrhea, constipation, heartburn.  Genitourinary: Denies  blood in urine, discharge, frequent urination.   Musculoskeletal: Positive for painful joints and sore muscles but denies back pain  Integumentary: Denies rashes, lumps, color changes.   Neurological: Denies frequent headaches,weakness, dizziness.    PHYSICAL EXAM  Obese    /84 (BP Location: Right arm, Patient " "Position: Sitting, BP Cuff Size: Adult)   Pulse 86   Resp 16   Ht 1.626 m (5' 4\")   Wt 82.1 kg (181 lb)   SpO2 97%   BMI 31.07 kg/m²   Appearance: Well-nourished, well-developed, no acute distress  Eyes:  PERRLA, EOMI.  Wearing glasses.  ENMT: without lesions, deformities;hearing grossly intact; tongue normal, posterior pharynx without erythema or exudate; Mallampati classification: 4  Neck: Supple, trachea midline, no masses  Respiratory effort:  No intercostal retractions or use of accessory muscles  Lung auscultation:  No wheezes rhonchi rubs or rales  Cardiac: No murmurs, rubs, or gallops; regular rhythm, normal rate; no edema  Abdomen:  No tenderness, no organomegaly.  Obese  Musculoskeletal:  Grossly normal; gait and station normal; digits and nails normal  Skin:  No rashes, petechiae, cyanosis  Neurologic: without focal signs; oriented to person, time, place, and purpose; judgement intact  Psychiatric:  No depression, anxiety, agitation        PROBLEMS:  1. JOSE (obstructive sleep apnea)    - zolpidem (AMBIEN) 5 MG Tab; Take 1 Tab by mouth at bedtime as needed for up to 3 doses. Take 1-3 tabs prn  Dispense: 3 Tab; Refill: 0  - Polysomnography, 4 or More; Future    2. Obesity (BMI 30-39.9)    - OBESITY COUNSELING (No Charge): Patient identified as having weight management issue.  Appropriate orders and counseling given.    3. Smoker    - REFERRAL TO TOBACCO CESSATION PROGRAM    4. Up to date with influenza vaccination      5. Essential hypertension        PLAN:   The patient has signs and symptoms consistent with obstructive sleep apnea hypopnea syndrome.  We will schedule him to have an attended nocturnal polysomnogram with a zolpidem assist pro re marbella.    The risks of untreated sleep apnea were discussed with the patient at length. Patients with JOSE are at increased risk of cardiovascular disease including coronary artery disease, systemic arterial hypertension, pulmonary arterial hypertension, cardiac " arrythmias, and stroke. JOSE patients have an increased risk of motor vehicle accidents, type 2 diabetes, chronic kidney disease, and non-alcoholic liver disease. The patient was advised to avoid driving a motor vehicle when drowsy.    Positive airway pressure, such as CPAP, is considered first-line and preferred therapy for sleep apnea and may reverse both symptoms and risks.    Have advised the patient to follow up with the appropriate healthcare practitioners for all other medical problems and issues.    Return for after sleep study.

## 2019-01-28 ENCOUNTER — SLEEP CENTER VISIT (OUTPATIENT)
Dept: SLEEP MEDICINE | Facility: MEDICAL CENTER | Age: 59
End: 2019-01-28
Payer: COMMERCIAL

## 2019-01-28 VITALS
WEIGHT: 181 LBS | RESPIRATION RATE: 16 BRPM | HEIGHT: 64 IN | OXYGEN SATURATION: 97 % | SYSTOLIC BLOOD PRESSURE: 122 MMHG | BODY MASS INDEX: 30.9 KG/M2 | DIASTOLIC BLOOD PRESSURE: 84 MMHG | HEART RATE: 86 BPM

## 2019-01-28 DIAGNOSIS — F17.200 SMOKER: ICD-10-CM

## 2019-01-28 DIAGNOSIS — E66.9 OBESITY (BMI 30-39.9): ICD-10-CM

## 2019-01-28 DIAGNOSIS — Z92.29 UP TO DATE WITH INFLUENZA VACCINATION: ICD-10-CM

## 2019-01-28 DIAGNOSIS — I10 ESSENTIAL HYPERTENSION: ICD-10-CM

## 2019-01-28 DIAGNOSIS — G47.33 OSA (OBSTRUCTIVE SLEEP APNEA): ICD-10-CM

## 2019-01-28 PROCEDURE — 99204 OFFICE O/P NEW MOD 45 MIN: CPT | Performed by: INTERNAL MEDICINE

## 2019-01-28 RX ORDER — ZOLPIDEM TARTRATE 5 MG/1
5 TABLET ORAL NIGHTLY PRN
Qty: 3 TAB | Refills: 0 | Status: SHIPPED | OUTPATIENT
Start: 2019-01-28 | End: 2019-02-26

## 2019-02-21 ENCOUNTER — SLEEP STUDY (OUTPATIENT)
Dept: SLEEP MEDICINE | Facility: MEDICAL CENTER | Age: 59
End: 2019-02-21
Attending: INTERNAL MEDICINE
Payer: COMMERCIAL

## 2019-02-21 DIAGNOSIS — G47.33 OSA (OBSTRUCTIVE SLEEP APNEA): ICD-10-CM

## 2019-02-21 PROCEDURE — 95810 POLYSOM 6/> YRS 4/> PARAM: CPT | Performed by: INTERNAL MEDICINE

## 2019-02-22 NOTE — PROCEDURES
Comments:  The patient underwent a diagnostic polysomnogram using the standard montage for measurement of parameters of sleep, respiratory events, movement abnormalities, and heart rate and rhythm.    A microphone was used to monitor snoring.  Interpretation:  Study start time was 10:38:23 PM.  Diagnostic recording time was 7h 8.5m with a total sleep time of 4h 21.5m resulting in a sleep efficiency of 61.03%%.    Sleep latency from the start of the study was 62 minutes and the latency from sleep to REM was 153 minutes.  In total,102  arousals were scored for an arousal index of 23.4.  Respiratory:  There were a total of 13 apneas consisting of 6 obstructive apneas, 0 mixed apneas, and 7 central apneas.  A total of 189 hypopneas were scored.  The apnea index was 2.98 per hour and the hypopnea index was 43.37 per hour resulting in an overall AHI of 46.35.  AHI during rem was 17.5 and AHI while supine was 79.71.  Oximetry:  There was a mean oxygen saturation of 94.0% with a minimum oxygen saturation of 77.0%.  Time spent with oxygen saturations below 89% was 21.7 minutes.  Cardiac:  The highest heart rate seen while awake was 105 BPM while the highest heart rate during sleep was 99 BPM with an average sleeping heart rate of 84 BPM.  Limb Movements:  There were a total of 20 PLMs during sleep, of which 9 were PLMS arousals.  This resulted in a PLMS index of 4.6 and a PLMS arousal index of 2.1.    The sleep efficiency was 61%.  Sleep architecture showed a lack of stage III sleep.  Sleep latency was prolonged at 62 minutes.  No EKG or EMG abnormalities were observed.    Once asleep frequent snoring was noted associated with obstructive respiratory events.  The overall AHI was 46/h comprised of 94% hypopneas.  There were associated oxygen desaturations for a total of 5.5 minutes, to a roxanne of 80%.    Split night criteria was not met.    Interpretation:  Severe obstructive sleep apnea syndrome, AHI 46/h, associated with  desaturations to a roxanne of 80%.    Recommendations:  Given the severity of sleep apnea, CPAP therapy is considered the optimal treatment.  A designated CPAP titration polysomnogram is advised.  Alternatively a trial on AutoPap could also be considered.  In general, weight loss, avoidance of alcohol, sedatives, and muscle relaxants, can be of added benefit.

## 2019-02-26 ENCOUNTER — OFFICE VISIT (OUTPATIENT)
Dept: MEDICAL GROUP | Facility: PHYSICIAN GROUP | Age: 59
End: 2019-02-26
Payer: COMMERCIAL

## 2019-02-26 VITALS
TEMPERATURE: 98.1 F | RESPIRATION RATE: 16 BRPM | HEART RATE: 100 BPM | SYSTOLIC BLOOD PRESSURE: 140 MMHG | OXYGEN SATURATION: 98 % | BODY MASS INDEX: 30.56 KG/M2 | DIASTOLIC BLOOD PRESSURE: 98 MMHG | WEIGHT: 179 LBS | HEIGHT: 64 IN

## 2019-02-26 DIAGNOSIS — L30.9 ECZEMA, UNSPECIFIED TYPE: ICD-10-CM

## 2019-02-26 DIAGNOSIS — I10 ESSENTIAL HYPERTENSION: ICD-10-CM

## 2019-02-26 DIAGNOSIS — L98.9 SKIN LESIONS: ICD-10-CM

## 2019-02-26 PROCEDURE — 99214 OFFICE O/P EST MOD 30 MIN: CPT | Performed by: INTERNAL MEDICINE

## 2019-02-26 RX ORDER — TRIAMCINOLONE ACETONIDE 1 MG/G
1 CREAM TOPICAL 2 TIMES DAILY
Qty: 1 TUBE | Refills: 0 | Status: SHIPPED | OUTPATIENT
Start: 2019-02-26 | End: 2019-04-02 | Stop reason: SDUPTHER

## 2019-02-26 ASSESSMENT — PAIN SCALES - GENERAL: PAINLEVEL: NO PAIN

## 2019-02-27 NOTE — PROGRESS NOTES
Subjective:   Cheikh Chacko is a 58 y.o. male here today for hypertension, skin lesion     Pt is here to follow up on hypertension.  Blood pressure was slightly elevated at his previous appointment..  I did increase hydrochlorothiazide to 25 mg daily.  He is also on losartan 100 mg daily.  Blood pressure still elevated today.  He is not checking blood pressure at home.  He denies chest pain, shortness of breath, leg swelling, palpitation, lightheadedness.  He refuses to add any other medication.  He would like to keep monitoring.  He believes blood pressure is elevated today because of him rushing to come to the clinic.  He denies any side effects from current regimen.  In addition patient shows me a few skin lesion on his back. he has had them for 5 months. He reports that they are itchy.  He denies changing detergent , soaps or shampoo. No fever. No one else with similar symptoms,. No fever. Lesions are slightly inflammatory, oval, papulosquamous on his back, big one is on the left low rib. Not blanchable. He had not tried anything       Current medicines (including changes today)  Current Outpatient Prescriptions   Medication Sig Dispense Refill   • triamcinolone acetonide (KENALOG) 0.1 % Cream Apply 1 Application to affected area(s) 2 times a day. 1 Tube 0   • losartan-hydrochlorothiazide (HYZAAR) 100-25 MG per tablet Take 1 Tab by mouth every day. 90 Tab 3     No current facility-administered medications for this visit.      He  has a past medical history of Hyperlipidemia; Hypertension; and Sleep apnea.    Current Outpatient Prescriptions   Medication Sig Dispense Refill   • triamcinolone acetonide (KENALOG) 0.1 % Cream Apply 1 Application to affected area(s) 2 times a day. 1 Tube 0   • losartan-hydrochlorothiazide (HYZAAR) 100-25 MG per tablet Take 1 Tab by mouth every day. 90 Tab 3     No current facility-administered medications for this visit.        Allergies as of 02/26/2019   • (No Known Allergies)  "      Social History     Social History   • Marital status:      Spouse name: N/A   • Number of children: N/A   • Years of education: N/A     Occupational History   • Not on file.     Social History Main Topics   • Smoking status: Current Every Day Smoker     Packs/day: 1.00     Years: 30.00     Types: Cigarettes   • Smokeless tobacco: Never Used   • Alcohol use No   • Drug use: No   • Sexual activity: Yes     Partners: Female      Comment: two children, and two step children      Other Topics Concern   • Not on file     Social History Narrative   • No narrative on file        Family History   Problem Relation Age of Onset   • Diabetes Sister    • Diabetes Brother    • Cancer Neg Hx    • Heart Disease Neg Hx    • Stroke Neg Hx    • Alcohol/Drug Neg Hx        History reviewed. No pertinent surgical history.    ROS   All systems reviewed are negative except for HPI     Objective:     Blood pressure 140/98, pulse 100, temperature 36.7 °C (98.1 °F), temperature source Temporal, resp. rate 16, height 1.626 m (5' 4.02\"), weight 81.2 kg (179 lb), SpO2 98 %. Body mass index is 30.71 kg/m².   Physical Exam:  Constitutional: Alert, no distress.  Skin: Warm, dry, good turgor, as per above   Eye: Equal, round and reactive, conjunctiva clear, lids normal.  ENMT: Lips without lesions, good dentition, oropharynx clear.  Neck: Trachea midline, no masses, no thyromegaly. No cervical or supraclavicular lymphadenopathy  Respiratory: Unlabored respiratory effort, lungs clear to auscultation, no wheezes, no ronchi.  Cardiovascular: Normal S1, S2, no murmur, no edema.  Abdomen: Soft, non-tender, no masses, no hepatosplenomegaly.  Psych: Alert and oriented x3, normal affect and mood.        Assessment and Plan:   The following treatment plan was discussed    1. Essential hypertension  Pt is hesitant to start any new medication. He would like to monitor at home. If still persitent at next apt. Consider to add amlodipine     2. " Eczema, unspecified type  3. Skin lesions  These are not typical for urticaria, or bed bugs, seems examathous patches, inflammatory, oval, papulosquamous lesions. Cannot rule out eczema, not typical for psoriasis. Possible pityriasis rosea ?? Not typical for cancer. Not typical for tinea corporis, because it is not clear in the center. Urgent derm referral for further eval and possible biopsy. Pt is a smoker   - triamcinolone acetonide (KENALOG) 0.1 % Cream; Apply 1 Application to affected area(s) 2 times a day.  Dispense: 1 Tube; Refill: 0  - REFERRAL TO DERMATOLOGY      Followup: Return in about 2 weeks (around 3/12/2019), or if symptoms worsen or fail to improve, for Short.

## 2019-03-29 ENCOUNTER — APPOINTMENT (OUTPATIENT)
Dept: MEDICAL GROUP | Facility: PHYSICIAN GROUP | Age: 59
End: 2019-03-29
Payer: COMMERCIAL

## 2019-04-02 ENCOUNTER — OFFICE VISIT (OUTPATIENT)
Dept: MEDICAL GROUP | Facility: PHYSICIAN GROUP | Age: 59
End: 2019-04-02
Payer: COMMERCIAL

## 2019-04-02 VITALS
DIASTOLIC BLOOD PRESSURE: 70 MMHG | BODY MASS INDEX: 30.54 KG/M2 | WEIGHT: 178 LBS | OXYGEN SATURATION: 96 % | TEMPERATURE: 98.4 F | SYSTOLIC BLOOD PRESSURE: 126 MMHG

## 2019-04-02 DIAGNOSIS — L98.9 SKIN LESIONS: ICD-10-CM

## 2019-04-02 DIAGNOSIS — I10 ESSENTIAL HYPERTENSION: ICD-10-CM

## 2019-04-02 DIAGNOSIS — F17.200 SMOKER: ICD-10-CM

## 2019-04-02 DIAGNOSIS — E66.9 OBESITY (BMI 30-39.9): ICD-10-CM

## 2019-04-02 DIAGNOSIS — L30.9 ECZEMA, UNSPECIFIED TYPE: ICD-10-CM

## 2019-04-02 DIAGNOSIS — G47.33 OSA (OBSTRUCTIVE SLEEP APNEA): ICD-10-CM

## 2019-04-02 PROBLEM — R06.83 SNORING: Status: RESOLVED | Noted: 2018-09-10 | Resolved: 2019-04-02

## 2019-04-02 PROCEDURE — 99214 OFFICE O/P EST MOD 30 MIN: CPT | Performed by: INTERNAL MEDICINE

## 2019-04-02 RX ORDER — TRIAMCINOLONE ACETONIDE 1 MG/G
1 CREAM TOPICAL 2 TIMES DAILY
Qty: 1 TUBE | Refills: 0 | Status: SHIPPED | OUTPATIENT
Start: 2019-04-02 | End: 2019-09-12

## 2019-04-02 RX ORDER — AMLODIPINE BESYLATE 5 MG/1
5 TABLET ORAL DAILY
Qty: 90 TAB | Refills: 1 | Status: SHIPPED | OUTPATIENT
Start: 2019-04-02 | End: 2019-04-29

## 2019-04-03 ENCOUNTER — TELEPHONE (OUTPATIENT)
Dept: HEMATOLOGY ONCOLOGY | Facility: MEDICAL CENTER | Age: 59
End: 2019-04-03

## 2019-04-03 NOTE — ASSESSMENT & PLAN NOTE
BP is slight elevated at home in the morning. He is currently on losartan-hydrochlorothiazide 100-25 mg. No side effects.  He denies chest pain, shortness of breath, leg swelling or blurry vision, headache.

## 2019-04-03 NOTE — TELEPHONE ENCOUNTER
Received referral to lung cancer screening program.  Chart review to assess for lung cancer screening program eligibility.   1. Age 55-77 yrs of age? Yes 58 y.o.  2. 30 pack year hx of smoking, or greater? Yes 1 [id number]= 30pkyr hx  3. Current smoker or if quit, has pt quit within last 15 yrs?Yes  Current smoker  4. Any signs or symptoms of lung cancer? None noted  5. Previous history of lung cancer? None noted  6. Chest CT within past 12 mos.? None noted  Patient does meet eligibility criteria. [facility] scheduling notified to schedule the shared decision making visit.

## 2019-04-03 NOTE — PROGRESS NOTES
Subjective:   Cheikh Chacko is a 58 y.o. male here today for hypertension, eczema    Eczema  Slight better. He has not been able to see dermatologist yet. Slight dry skin, well marketed, not itchy.     Essential hypertension  BP is slight elevated at home in the morning. He is currently on losartan-hydrochlorothiazide 100-25 mg. No side effects.  He denies chest pain, shortness of breath, leg swelling or blurry vision, headache.    Obesity (BMI 30-39.9)  Counseling for a small portion, balanced diet, exercising for3-5 times per week.      JOSE (obstructive sleep apnea)  Pending sleep studies.     Smoker  Counseling provided, he is trying it wean himself        Current medicines (including changes today)  Current Outpatient Prescriptions   Medication Sig Dispense Refill   • amLODIPine (NORVASC) 5 MG Tab Take 1 Tab by mouth every day. 90 Tab 1   • triamcinolone acetonide (KENALOG) 0.1 % Cream Apply 1 Application to affected area(s) 2 times a day. 1 Tube 0   • losartan-hydrochlorothiazide (HYZAAR) 100-25 MG per tablet Take 1 Tab by mouth every day. 90 Tab 3     No current facility-administered medications for this visit.      He  has a past medical history of Hyperlipidemia; Hypertension; and Sleep apnea.    Current Outpatient Prescriptions   Medication Sig Dispense Refill   • amLODIPine (NORVASC) 5 MG Tab Take 1 Tab by mouth every day. 90 Tab 1   • triamcinolone acetonide (KENALOG) 0.1 % Cream Apply 1 Application to affected area(s) 2 times a day. 1 Tube 0   • losartan-hydrochlorothiazide (HYZAAR) 100-25 MG per tablet Take 1 Tab by mouth every day. 90 Tab 3     No current facility-administered medications for this visit.        Allergies as of 04/02/2019   • (No Known Allergies)       Social History     Social History   • Marital status:      Spouse name: N/A   • Number of children: N/A   • Years of education: N/A     Occupational History   • Not on file.     Social History Main Topics   • Smoking status:  Current Every Day Smoker     Packs/day: 1.00     Years: 30.00     Types: Cigarettes   • Smokeless tobacco: Never Used   • Alcohol use No   • Drug use: No   • Sexual activity: Yes     Partners: Female      Comment: two children, and two step children      Other Topics Concern   • Not on file     Social History Narrative   • No narrative on file        Family History   Problem Relation Age of Onset   • Diabetes Sister    • Diabetes Brother    • Cancer Neg Hx    • Heart Disease Neg Hx    • Stroke Neg Hx    • Alcohol/Drug Neg Hx        History reviewed. No pertinent surgical history.    ROS   All systems reviewed are negative except for HPI       Objective:     /70 (BP Location: Left arm, Patient Position: Sitting, BP Cuff Size: Adult)   Temp 36.9 °C (98.4 °F) (Temporal)   Wt 80.7 kg (178 lb)   SpO2 96%  Body mass index is 30.54 kg/m².   Physical Exam:  Constitutional: Alert, no distress.  Skin: Warm, dry, good turgor, no rashes in visible areas.  Eye: Equal, round and reactive, conjunctiva clear, lids normal.  ENMT: Lips without lesions, good dentition, oropharynx clear.  Neck: Trachea midline, no masses, no thyromegaly. No cervical or supraclavicular lymphadenopathy  Respiratory: Unlabored respiratory effort, lungs clear to auscultation, no wheezes, no ronchi.  Cardiovascular: Normal S1, S2, no murmur, no edema.  Abdomen: Soft, non-tender, no masses, no hepatosplenomegaly.  Psych: Alert and oriented x3, normal affect and mood.        Assessment and Plan:   The following treatment plan was discussed    1. Essential hypertension  .  Patient amlodipine 5 mg before going to sleep.  I did discuss side effects as mild leg swelling, constipation.  Continue to monitor  - amLODIPine (NORVASC) 5 MG Tab; Take 1 Tab by mouth every day.  Dispense: 90 Tab; Refill: 1    2. JOSE (obstructive sleep apnea)  Follow-up with sleep center.  Continue to monitor    3. Eczema, unspecified type  Refill of steroids provided.  Advised  patient to follow-up with dermatologist  - triamcinolone acetonide (KENALOG) 0.1 % Cream; Apply 1 Application to affected area(s) 2 times a day.  Dispense: 1 Tube; Refill: 0    4. Obesity (BMI 30-39.9)  Counseling as per above    5. Smoker  Patient is a candidate for lung cancer screening.  He is asymptomatic.  He denies hemoptysis, night sweats, unintentional weight loss, cough, lymphadenopathy  - REFERRAL TO LUNG CANCER SCREENING PROGRAM    6. Skin lesions  Follow up with derm   - triamcinolone acetonide (KENALOG) 0.1 % Cream; Apply 1 Application to affected area(s) 2 times a day.  Dispense: 1 Tube; Refill: 0      Followup: Return in about 2 months (around 6/2/2019), or if symptoms worsen or fail to improve, for Short.

## 2019-04-03 NOTE — ASSESSMENT & PLAN NOTE
Slight better. He has not been able to see dermatologist yet. Slight dry skin, well marketed, not itchy.

## 2019-04-08 ENCOUNTER — TELEPHONE (OUTPATIENT)
Dept: HEMATOLOGY ONCOLOGY | Facility: MEDICAL CENTER | Age: 59
End: 2019-04-08

## 2019-04-08 NOTE — TELEPHONE ENCOUNTER
1st attempt to contact the patient,- left voicemail for patient requesting a return call to schedule their shared decision making appointment.   [facility]/ [place]/ Nicotine Dependence/[place] [place]

## 2019-04-23 ENCOUNTER — OFFICE VISIT (OUTPATIENT)
Dept: HEMATOLOGY ONCOLOGY | Facility: MEDICAL CENTER | Age: 59
End: 2019-04-23
Payer: COMMERCIAL

## 2019-04-23 VITALS
RESPIRATION RATE: 16 BRPM | DIASTOLIC BLOOD PRESSURE: 70 MMHG | SYSTOLIC BLOOD PRESSURE: 136 MMHG | HEART RATE: 80 BPM | TEMPERATURE: 98.4 F | BODY MASS INDEX: 30.84 KG/M2 | OXYGEN SATURATION: 97 % | HEIGHT: 64 IN | WEIGHT: 180.67 LBS

## 2019-04-23 DIAGNOSIS — F17.210 CIGARETTE SMOKER: ICD-10-CM

## 2019-04-23 PROCEDURE — G0296 VISIT TO DETERM LDCT ELIG: HCPCS | Performed by: NURSE PRACTITIONER

## 2019-04-23 ASSESSMENT — PAIN SCALES - GENERAL: PAINLEVEL: NO PAIN

## 2019-04-23 ASSESSMENT — ENCOUNTER SYMPTOMS
SHORTNESS OF BREATH: 0
HEMOPTYSIS: 0
COUGH: 1
WHEEZING: 0
WEIGHT LOSS: 0
SPUTUM PRODUCTION: 0

## 2019-04-23 NOTE — PROGRESS NOTES
"Subjective:      Cheikh Chacko is a 59 y.o. male who presents for Lung Cancer Screening Program Prescreen (Nicotine Dependence) for lung cancer screening shared decision making visit.        HPI   Patient seen today for initial lung cancer screening visit. Patient referred by PCP, Dr. Ashu Brandt.     The patient meets eligibility criteria including age, smoking history (30+ pack years), if former smoker, quit in the last 15 years, and absence of signs or symptoms of lung cancer.    - Age - 59  - Smoking history - Patient has smoked for 34 years at an average of 1 ppd = 34 pack year smoking history.  - Current smoking status - current smoker  - No symptoms of lung cancer and no previous history of lung cancer       No Known Allergies      Current Outpatient Prescriptions on File Prior to Visit   Medication Sig Dispense Refill   • amLODIPine (NORVASC) 5 MG Tab Take 1 Tab by mouth every day. 90 Tab 1   • losartan-hydrochlorothiazide (HYZAAR) 100-25 MG per tablet Take 1 Tab by mouth every day. 90 Tab 3   • triamcinolone acetonide (KENALOG) 0.1 % Cream Apply 1 Application to affected area(s) 2 times a day. 1 Tube 0     No current facility-administered medications on file prior to visit.          Review of Systems   Constitutional: Negative for malaise/fatigue and weight loss.   Respiratory: Positive for cough (intermittent - slef limiting). Negative for hemoptysis, sputum production, shortness of breath and wheezing.           Objective:     /70 (BP Location: Right arm, Patient Position: Sitting, BP Cuff Size: Adult)   Pulse 80   Temp 36.9 °C (98.4 °F) (Temporal)   Resp 16   Ht 1.626 m (5' 4.02\")   Wt 81.9 kg (180 lb 10.7 oz)   SpO2 97%   BMI 30.99 kg/m²        Physical Exam   Constitutional: He is oriented to person, place, and time. He appears well-developed and well-nourished. No distress.   Cardiovascular: Normal rate, regular rhythm and normal heart sounds.  Exam reveals no gallop and no friction rub.  "   No murmur heard.  Pulmonary/Chest: Effort normal and breath sounds normal. No respiratory distress. He has no wheezes.   Musculoskeletal: Normal range of motion.   Neurological: He is alert and oriented to person, place, and time.   Skin: Skin is warm and dry. He is not diaphoretic.   Vitals reviewed.         Assessment/Plan:     1. Cigarette smoker  CT-LUNG CANCER-SCREENING         We conducted a shared decision-making process using a decision aid. We reviewed benefits and harms of screening, including false positives and potential need for additional diagnostic testing, the possibility of over diagnosis, and total radiation exposure.    We discussed the importance of adhering to annual LDCT screening. We also discussed the impact of comorbities on the patient's the ability or willingness to undergo diagnostic procedure(s) and treatment.    Counseling on the importance of maintaining cigarette smoking abstinence if former smoker; or the importance of smoking cessation if current smoker and, if appropriate, furnishing of information about tobacco cessation interventions. I provided patient with smoking cessation materials and resources within [facility] and the community. Patient appreciative of the resources.     Based on our discussion, we have decided to begin annual lung cancer screening starting now.

## 2019-04-29 ENCOUNTER — OFFICE VISIT (OUTPATIENT)
Dept: CARDIOLOGY | Facility: MEDICAL CENTER | Age: 59
End: 2019-04-29
Payer: COMMERCIAL

## 2019-04-29 VITALS
HEART RATE: 80 BPM | BODY MASS INDEX: 30.56 KG/M2 | SYSTOLIC BLOOD PRESSURE: 150 MMHG | OXYGEN SATURATION: 97 % | WEIGHT: 179 LBS | HEIGHT: 64 IN | DIASTOLIC BLOOD PRESSURE: 90 MMHG

## 2019-04-29 DIAGNOSIS — Z79.899 HIGH RISK MEDICATION USE: ICD-10-CM

## 2019-04-29 DIAGNOSIS — I10 HTN (HYPERTENSION), MALIGNANT: ICD-10-CM

## 2019-04-29 PROCEDURE — 99214 OFFICE O/P EST MOD 30 MIN: CPT | Performed by: INTERNAL MEDICINE

## 2019-04-29 RX ORDER — LOSARTAN POTASSIUM AND HYDROCHLOROTHIAZIDE 25; 100 MG/1; MG/1
1 TABLET ORAL DAILY
Qty: 90 TAB | Refills: 3 | Status: SHIPPED | OUTPATIENT
Start: 2019-04-29 | End: 2022-01-31 | Stop reason: SDUPTHER

## 2019-04-29 RX ORDER — AMLODIPINE BESYLATE 10 MG/1
10 TABLET ORAL DAILY
Qty: 90 TAB | Refills: 3 | Status: SHIPPED | OUTPATIENT
Start: 2019-04-29 | End: 2022-01-31

## 2019-04-29 ASSESSMENT — ENCOUNTER SYMPTOMS
BLOOD IN STOOL: 0
WEIGHT LOSS: 0
SENSORY CHANGE: 0
PALPITATIONS: 0
CHILLS: 0
DOUBLE VISION: 0
PND: 0
MYALGIAS: 0
LOSS OF CONSCIOUSNESS: 0
ABDOMINAL PAIN: 0
CLAUDICATION: 0
NAUSEA: 0
EYE PAIN: 0
SPEECH CHANGE: 0
EYE DISCHARGE: 0
SHORTNESS OF BREATH: 0
HEADACHES: 0
HALLUCINATIONS: 0
DIZZINESS: 0
FEVER: 0
BRUISES/BLEEDS EASILY: 0
DEPRESSION: 0
COUGH: 0
BLURRED VISION: 0
VOMITING: 0
ORTHOPNEA: 0
FALLS: 0

## 2019-04-29 NOTE — LETTER
[facility]   [address]   [address]  [place] [address]  Phone: [phone]  Fax: [phone]              Cheikh Chacko  1960    Encounter Date: 4/29/2019    Donald Rondon M.D.          PROGRESS NOTE:  Chief Complaint   Patient presents with   • HTN (Controlled)       Subjective:   Cheikh Chacko is a 59 y.o. male who presents today for cardiac care and evaluation due to abnormal EKG, HTN.    In the interim, patient has been doing well without having any symptoms. Patient denies having chest pain, dyspnea, palpitation, presyncope, syncope episodes. Able to climb up at least 2 flights of stairs.    No TTE yet.    Past Medical History:   Diagnosis Date   • Hyperlipidemia    • Hypertension    • Sleep apnea      History reviewed. No pertinent surgical history.  Family History   Problem Relation Age of Onset   • Diabetes Sister    • Diabetes Brother    • Cancer Neg Hx    • Heart Disease Neg Hx    • Stroke Neg Hx    • Alcohol/Drug Neg Hx      Social History     Social History   • Marital status:      Spouse name: N/A   • Number of children: N/A   • Years of education: N/A     Occupational History   • Not on file.     Social History Main Topics   • Smoking status: Current Every Day Smoker     Packs/day: 1.00     Years: 34.00     Types: Cigarettes   • Smokeless tobacco: Never Used      Comment: since age 25   • Alcohol use No   • Drug use: No   • Sexual activity: Yes     Partners: Female      Comment: two children, and two step children      Other Topics Concern   • Not on file     Social History Narrative   • No narrative on file     No Known Allergies  Outpatient Encounter Prescriptions as of 4/29/2019   Medication Sig Dispense Refill   • amLODIPine (NORVASC) 10 MG Tab Take 1 Tab by mouth every day. 90 Tab 3   • losartan-hydrochlorothiazide (HYZAAR) 100-25 MG per tablet Take 1 Tab by mouth every day. 90 Tab 3   • triamcinolone acetonide  "(KENALOG) 0.1 % Cream Apply 1 Application to affected area(s) 2 times a day. 1 Tube 0   • [DISCONTINUED] amLODIPine (NORVASC) 5 MG Tab Take 1 Tab by mouth every day. 90 Tab 1   • [DISCONTINUED] losartan-hydrochlorothiazide (HYZAAR) 100-25 MG per tablet Take 1 Tab by mouth every day. 90 Tab 3     No facility-administered encounter medications on file as of 4/29/2019.      Review of Systems   Constitutional: Negative for chills, fever, malaise/fatigue and weight loss.   HENT: Negative for ear discharge, ear pain, hearing loss and nosebleeds.    Eyes: Negative for blurred vision, double vision, pain and discharge.   Respiratory: Negative for cough and shortness of breath.    Cardiovascular: Negative for chest pain, palpitations, orthopnea, claudication, leg swelling and PND.   Gastrointestinal: Negative for abdominal pain, blood in stool, melena, nausea and vomiting.   Genitourinary: Negative for dysuria and hematuria.   Musculoskeletal: Negative for falls, joint pain and myalgias.   Skin: Negative for itching and rash.   Neurological: Negative for dizziness, sensory change, speech change, loss of consciousness and headaches.   Endo/Heme/Allergies: Negative for environmental allergies. Does not bruise/bleed easily.   Psychiatric/Behavioral: Negative for depression, hallucinations and suicidal ideas.        Objective:   /90 (BP Location: Right arm, Patient Position: Sitting)   Pulse 80   Ht 1.626 m (5' 4\")   Wt 81.2 kg (179 lb)   SpO2 97%   BMI 30.73 kg/m²      Physical Exam   Constitutional: He is oriented to person, place, and time. No distress.   HENT:   Head: Normocephalic and atraumatic.   Right Ear: External ear normal.   Left Ear: External ear normal.   Eyes: Right eye exhibits no discharge. Left eye exhibits no discharge.   Neck: No JVD present. No thyromegaly present.   Cardiovascular: Normal rate, regular rhythm, normal heart sounds and intact distal pulses.  Exam reveals no gallop and no friction " rub.    No murmur heard.  Pulmonary/Chest: Breath sounds normal. No respiratory distress.   Abdominal: Bowel sounds are normal. He exhibits no distension. There is no tenderness.   Musculoskeletal: He exhibits no edema or tenderness.   Neurological: He is alert and oriented to person, place, and time. No cranial nerve deficit.   Skin: Skin is warm and dry. He is not diaphoretic.   Psychiatric: He has a normal mood and affect. His behavior is normal.   Nursing note and vitals reviewed.      Assessment:     1. HTN (hypertension), malignant  amLODIPine (NORVASC) 10 MG Tab    Comp Metabolic Panel    LIPID PANEL    losartan-hydrochlorothiazide (HYZAAR) 100-25 MG per tablet   2. High risk medication use  Comp Metabolic Panel    LIPID PANEL       Medical Decision Making:  Today's Assessment / Status / Plan:   Blood pressure is high. Increase Amlodipine to 10 mg once a day.  Continue Hyzaar at current dose as above.    I will see patient back in clinic with lab tests and studies results in 6 months.    I thank you for referring patient to our Cardiology Clinic today.        Ashu Brandt M.D.  [address] [facility] [id number]  VIA In Basket

## 2019-04-29 NOTE — PROGRESS NOTES
Chief Complaint   Patient presents with   • HTN (Controlled)       Subjective:   Cheikh Chacko is a 59 y.o. male who presents today for cardiac care and evaluation due to abnormal EKG, HTN.    In the interim, patient has been doing well without having any symptoms. Patient denies having chest pain, dyspnea, palpitation, presyncope, syncope episodes. Able to climb up at least 2 flights of stairs.    No TTE yet.    Past Medical History:   Diagnosis Date   • Hyperlipidemia    • Hypertension    • Sleep apnea      History reviewed. No pertinent surgical history.  Family History   Problem Relation Age of Onset   • Diabetes Sister    • Diabetes Brother    • Cancer Neg Hx    • Heart Disease Neg Hx    • Stroke Neg Hx    • Alcohol/Drug Neg Hx      Social History     Social History   • Marital status:      Spouse name: N/A   • Number of children: N/A   • Years of education: N/A     Occupational History   • Not on file.     Social History Main Topics   • Smoking status: Current Every Day Smoker     Packs/day: 1.00     Years: 34.00     Types: Cigarettes   • Smokeless tobacco: Never Used      Comment: since age 25   • Alcohol use No   • Drug use: No   • Sexual activity: Yes     Partners: Female      Comment: two children, and two step children      Other Topics Concern   • Not on file     Social History Narrative   • No narrative on file     No Known Allergies  Outpatient Encounter Prescriptions as of 4/29/2019   Medication Sig Dispense Refill   • amLODIPine (NORVASC) 10 MG Tab Take 1 Tab by mouth every day. 90 Tab 3   • losartan-hydrochlorothiazide (HYZAAR) 100-25 MG per tablet Take 1 Tab by mouth every day. 90 Tab 3   • triamcinolone acetonide (KENALOG) 0.1 % Cream Apply 1 Application to affected area(s) 2 times a day. 1 Tube 0   • [DISCONTINUED] amLODIPine (NORVASC) 5 MG Tab Take 1 Tab by mouth every day. 90 Tab 1   • [DISCONTINUED] losartan-hydrochlorothiazide (HYZAAR) 100-25 MG per tablet Take 1 Tab by mouth every  "day. 90 Tab 3     No facility-administered encounter medications on file as of 4/29/2019.      Review of Systems   Constitutional: Negative for chills, fever, malaise/fatigue and weight loss.   HENT: Negative for ear discharge, ear pain, hearing loss and nosebleeds.    Eyes: Negative for blurred vision, double vision, pain and discharge.   Respiratory: Negative for cough and shortness of breath.    Cardiovascular: Negative for chest pain, palpitations, orthopnea, claudication, leg swelling and PND.   Gastrointestinal: Negative for abdominal pain, blood in stool, melena, nausea and vomiting.   Genitourinary: Negative for dysuria and hematuria.   Musculoskeletal: Negative for falls, joint pain and myalgias.   Skin: Negative for itching and rash.   Neurological: Negative for dizziness, sensory change, speech change, loss of consciousness and headaches.   Endo/Heme/Allergies: Negative for environmental allergies. Does not bruise/bleed easily.   Psychiatric/Behavioral: Negative for depression, hallucinations and suicidal ideas.        Objective:   /90 (BP Location: Right arm, Patient Position: Sitting)   Pulse 80   Ht 1.626 m (5' 4\")   Wt 81.2 kg (179 lb)   SpO2 97%   BMI 30.73 kg/m²     Physical Exam   Constitutional: He is oriented to person, place, and time. No distress.   HENT:   Head: Normocephalic and atraumatic.   Right Ear: External ear normal.   Left Ear: External ear normal.   Eyes: Right eye exhibits no discharge. Left eye exhibits no discharge.   Neck: No JVD present. No thyromegaly present.   Cardiovascular: Normal rate, regular rhythm, normal heart sounds and intact distal pulses.  Exam reveals no gallop and no friction rub.    No murmur heard.  Pulmonary/Chest: Breath sounds normal. No respiratory distress.   Abdominal: Bowel sounds are normal. He exhibits no distension. There is no tenderness.   Musculoskeletal: He exhibits no edema or tenderness.   Neurological: He is alert and oriented to " person, place, and time. No cranial nerve deficit.   Skin: Skin is warm and dry. He is not diaphoretic.   Psychiatric: He has a normal mood and affect. His behavior is normal.   Nursing note and vitals reviewed.      Assessment:     1. HTN (hypertension), malignant  amLODIPine (NORVASC) 10 MG Tab    Comp Metabolic Panel    LIPID PANEL    losartan-hydrochlorothiazide (HYZAAR) 100-25 MG per tablet   2. High risk medication use  Comp Metabolic Panel    LIPID PANEL       Medical Decision Making:  Today's Assessment / Status / Plan:   Blood pressure is high. Increase Amlodipine to 10 mg once a day.  Continue Hyzaar at current dose as above.    I will see patient back in clinic with lab tests and studies results in 6 months.    I thank you for referring patient to our Cardiology Clinic today.

## 2019-05-06 ENCOUNTER — APPOINTMENT (RX ONLY)
Dept: URBAN - METROPOLITAN AREA CLINIC 4 | Facility: CLINIC | Age: 59
Setting detail: DERMATOLOGY
End: 2019-05-06

## 2019-05-06 DIAGNOSIS — L259 CONTACT DERMATITIS AND OTHER ECZEMA, UNSPECIFIED CAUSE: ICD-10-CM

## 2019-05-06 DIAGNOSIS — L81.4 OTHER MELANIN HYPERPIGMENTATION: ICD-10-CM

## 2019-05-06 DIAGNOSIS — D18.0 HEMANGIOMA: ICD-10-CM

## 2019-05-06 DIAGNOSIS — L82.1 OTHER SEBORRHEIC KERATOSIS: ICD-10-CM

## 2019-05-06 DIAGNOSIS — D22 MELANOCYTIC NEVI: ICD-10-CM

## 2019-05-06 DIAGNOSIS — Z71.89 OTHER SPECIFIED COUNSELING: ICD-10-CM

## 2019-05-06 DIAGNOSIS — L81.0 POSTINFLAMMATORY HYPERPIGMENTATION: ICD-10-CM

## 2019-05-06 PROBLEM — D22.5 MELANOCYTIC NEVI OF TRUNK: Status: ACTIVE | Noted: 2019-05-06

## 2019-05-06 PROBLEM — I10 ESSENTIAL (PRIMARY) HYPERTENSION: Status: ACTIVE | Noted: 2019-05-06

## 2019-05-06 PROBLEM — L30.8 OTHER SPECIFIED DERMATITIS: Status: ACTIVE | Noted: 2019-05-06

## 2019-05-06 PROBLEM — D18.01 HEMANGIOMA OF SKIN AND SUBCUTANEOUS TISSUE: Status: ACTIVE | Noted: 2019-05-06

## 2019-05-06 PROCEDURE — ? DIAGNOSIS COMMENT

## 2019-05-06 PROCEDURE — 99203 OFFICE O/P NEW LOW 30 MIN: CPT

## 2019-05-06 PROCEDURE — ? COUNSELING

## 2019-05-06 PROCEDURE — ? ADDITIONAL NOTES

## 2019-05-06 ASSESSMENT — LOCATION DETAILED DESCRIPTION DERM
LOCATION DETAILED: RIGHT MID-UPPER BACK
LOCATION DETAILED: LEFT MEDIAL UPPER BACK
LOCATION DETAILED: RIGHT SUPERIOR MEDIAL UPPER BACK
LOCATION DETAILED: SUPERIOR THORACIC SPINE
LOCATION DETAILED: LEFT INFERIOR MEDIAL FOREHEAD
LOCATION DETAILED: LOWER STERNUM
LOCATION DETAILED: STERNUM
LOCATION DETAILED: LEFT FOREHEAD
LOCATION DETAILED: LEFT MEDIAL FOREHEAD

## 2019-05-06 ASSESSMENT — LOCATION ZONE DERM
LOCATION ZONE: TRUNK
LOCATION ZONE: FACE

## 2019-05-06 ASSESSMENT — LOCATION SIMPLE DESCRIPTION DERM
LOCATION SIMPLE: CHEST
LOCATION SIMPLE: LEFT UPPER BACK
LOCATION SIMPLE: RIGHT UPPER BACK
LOCATION SIMPLE: UPPER BACK
LOCATION SIMPLE: LEFT FOREHEAD

## 2019-05-06 NOTE — PROCEDURE: DIAGNOSIS COMMENT
Comment: No active dermatitis at this time. PIH present. Suspect eczematous dermatitis. Recommend aggressive emollient use, TAC given for flare ups. Patient to call/RTC for any significant flare ups so lesions can be evaluated.
Detail Level: Zone

## 2019-05-06 NOTE — PROCEDURE: ADDITIONAL NOTES
Additional Notes: Areas of concern on the back, clinically consistent with post inflammatory hyperpigmentation.
Detail Level: Zone

## 2019-05-06 NOTE — PROCEDURE: COUNSELING
Detail Level: Zone
Detail Level: Generalized
Patient Specific Counseling (Will Not Stick From Patient To Patient): Patient advised to use TAC when rash active. \\nPatient advised to take photos when and if rash flares. \\nPatient advised to keep a symptom log. \\nPatient advised to use and keep fragrance free and dye free protocol. \\nAdvised patient to use CeraVe or Cetaphil cream. \\nPatient advised to contact our office if rash worsens despite use of TAC.

## 2019-05-06 NOTE — HPI: EVALUATION OF SKIN LESION(S)
What Type Of Note Output Would You Prefer (Optional)?: Standard Output
How Severe Are Your Spot(S)?: moderate
Have Your Spot(S) Been Treated In The Past?: has been treated
Hpi Title: Evaluation of Skin Lesions
Additional History: Patient states he is no longer on the TAC. He states these lesions are only itchy in the winter months.
When Was It Treated?: 3/2019

## 2019-05-08 ENCOUNTER — SLEEP CENTER VISIT (OUTPATIENT)
Dept: SLEEP MEDICINE | Facility: MEDICAL CENTER | Age: 59
End: 2019-05-08
Payer: COMMERCIAL

## 2019-05-08 VITALS
HEART RATE: 103 BPM | OXYGEN SATURATION: 97 % | HEIGHT: 64 IN | WEIGHT: 180 LBS | DIASTOLIC BLOOD PRESSURE: 70 MMHG | BODY MASS INDEX: 30.73 KG/M2 | RESPIRATION RATE: 16 BRPM | SYSTOLIC BLOOD PRESSURE: 124 MMHG

## 2019-05-08 DIAGNOSIS — E66.9 OBESITY (BMI 30-39.9): ICD-10-CM

## 2019-05-08 DIAGNOSIS — Z71.6 TOBACCO ABUSE COUNSELING: ICD-10-CM

## 2019-05-08 DIAGNOSIS — G47.33 OSA (OBSTRUCTIVE SLEEP APNEA): ICD-10-CM

## 2019-05-08 PROCEDURE — 99214 OFFICE O/P EST MOD 30 MIN: CPT | Performed by: NURSE PRACTITIONER

## 2019-05-08 RX ORDER — ZOLPIDEM TARTRATE 5 MG/1
5 TABLET ORAL NIGHTLY PRN
Qty: 3 TAB | Refills: 0 | Status: SHIPPED | OUTPATIENT
Start: 2019-05-08 | End: 2019-05-11
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
## 2019-05-08 NOTE — PATIENT INSTRUCTIONS
1) Sleep titration   2) Ambien Rx  3) Light conditioning encouraged  4) Encourage smoking cessation   4) Vaccines: Up to date with Pneumovax 23  5) Return in about 6 weeks (around 6/19/2019) for follow up with GARCIA Mejia, if not sooner, review of symptoms, sleep study results.

## 2019-05-22 ENCOUNTER — SLEEP STUDY (OUTPATIENT)
Dept: SLEEP MEDICINE | Facility: MEDICAL CENTER | Age: 59
End: 2019-05-22
Attending: NURSE PRACTITIONER
Payer: COMMERCIAL

## 2019-05-22 DIAGNOSIS — G47.33 OSA (OBSTRUCTIVE SLEEP APNEA): ICD-10-CM

## 2019-05-22 PROCEDURE — 95811 POLYSOM 6/>YRS CPAP 4/> PARM: CPT | Performed by: FAMILY MEDICINE

## 2019-05-23 NOTE — PROCEDURES
Technical summary: The patient underwent a CPAP titration.  This was a 16 channel montage study to include a 6 channel EEG, a 2 channel EOG, and chin EMG, left and right leg EMG, a snore channel, and a CFLOW pressure transducer.   Respiratory effort was assessed with the use of a thoracic and abdominal monitor and overnight oximetry was obtained. Audio and video recordings were reviewed. This was a fully attended study and sleep stage scoring was performed. The test was technically adequate.    Interpretation:  Study start time was 09:59:42 PM.  Diagnostic recording time was 7h 15.0m with a total sleep time of 5h 12.0m resulting in a sleep efficiency of 71.72%%.    Sleep latency from the start of the study was 24 minutes and the latency from sleep to REM was 240 minutes.  In total,79  arousals were scored for an arousal index of 15.2.    Respiratory:  There were a total of 8 apneas consisting of 3 obstructive apneas, 0 mixed apneas, and 5 central apneas.  A total of 107 hypopneas were scored.  The apnea index was 1.54 per hour and the hypopnea index was 20.58 per hour resulting in an overall AHI of 22.12.  AHI during rem was 8.1 and AHI while supine was 22.72.    Oximetry:  There was a mean oxygen saturation of 94.0% with a minimum oxygen saturation of 82.0%.  Time spent with oxygen saturations below 89% was 15.7 minutes.    Cardiac:  The highest heart rate seen while awake was 109 BPM while the highest heart rate during sleep was 87 BPM with an average sleeping heart rate of 77 BPM.    Limb Movements:  There were a total of 58 PLMs during sleep, of which 10 were PLMS arousals.  This resulted in a PLMS index of 11.2 and a PLMS arousal index of 1.9.     CPAP was tried from 5  to 10cm H2O.    CPAP Titration:  The PAP titration was initiated with CPAP 5 cm of water and the pressure which was slowly titrated up in an attempt to eliminate sleep disordered breathing and snoring. The final pressure tested during the study  was CPAP 10 cm water and at this final pressure the patient was observed in the supine REM sleep stage. The apnea hypopnea index improved to 5.5 per hour and O2 roxanne 90%. The average O2 stauration was 94%. He spent 5 min of sleep time below 89% O2 saturation. Snoring was resolved. There were no significant periodic limb movements.  The patient demonstrated NSR and an average heart rate of 77 beats per minute.  There was no ventricular ectopy or tachyarrhythmias. The patient utilized small F20 mask with heated humidification. The CPAP was well-tolerated and there were minimal air leaks. No supplemental oxygen was required.    Impression:  1.  Obstructive sleep apnea  2.  Successful CPAP titration       Recommendations:  I recommend CPAP 10 cm with small F20 mask. Recommended 30 day compliance download to assess the efficacy of the recommended pressure and compliance for further outpatient monitoring and management of CPAP therapy. In some cases alternative treatment options may prove effective in resolving sleep apnea and these options include upper airway surgery, the use of a dental orthotic or weight loss and positional therapy. Clinical correlation is required. In general patients with sleep apnea are advised to avoid alcohol and sedatives and to not operate a motor vehicle while drowsy and are at a greater risk for cardiovascular disease.

## 2019-07-03 ENCOUNTER — APPOINTMENT (OUTPATIENT)
Dept: SLEEP MEDICINE | Facility: MEDICAL CENTER | Age: 59
End: 2019-07-03
Payer: COMMERCIAL

## 2019-07-23 ENCOUNTER — HOSPITAL ENCOUNTER (EMERGENCY)
Dept: HOSPITAL 8 - ED | Age: 59
Discharge: HOME | End: 2019-07-23
Payer: COMMERCIAL

## 2019-07-23 VITALS — WEIGHT: 177.69 LBS | HEIGHT: 64 IN | BODY MASS INDEX: 30.34 KG/M2

## 2019-07-23 VITALS — DIASTOLIC BLOOD PRESSURE: 79 MMHG | SYSTOLIC BLOOD PRESSURE: 140 MMHG

## 2019-07-23 DIAGNOSIS — R06.02: ICD-10-CM

## 2019-07-23 DIAGNOSIS — R07.89: Primary | ICD-10-CM

## 2019-07-23 DIAGNOSIS — I10: ICD-10-CM

## 2019-07-23 DIAGNOSIS — F17.210: ICD-10-CM

## 2019-07-23 PROCEDURE — 80053 COMPREHEN METABOLIC PANEL: CPT

## 2019-07-23 PROCEDURE — 84484 ASSAY OF TROPONIN QUANT: CPT

## 2019-07-23 PROCEDURE — 36415 COLL VENOUS BLD VENIPUNCTURE: CPT

## 2019-07-23 PROCEDURE — 85025 COMPLETE CBC W/AUTO DIFF WBC: CPT

## 2019-07-23 PROCEDURE — 83880 ASSAY OF NATRIURETIC PEPTIDE: CPT

## 2019-07-23 PROCEDURE — 71045 X-RAY EXAM CHEST 1 VIEW: CPT

## 2019-07-23 PROCEDURE — 93005 ELECTROCARDIOGRAM TRACING: CPT

## 2019-07-23 PROCEDURE — 99284 EMERGENCY DEPT VISIT MOD MDM: CPT

## 2019-08-15 ENCOUNTER — OFFICE VISIT (OUTPATIENT)
Dept: MEDICAL GROUP | Facility: PHYSICIAN GROUP | Age: 59
End: 2019-08-15
Payer: COMMERCIAL

## 2019-08-15 VITALS
RESPIRATION RATE: 14 BRPM | BODY MASS INDEX: 29.88 KG/M2 | DIASTOLIC BLOOD PRESSURE: 70 MMHG | SYSTOLIC BLOOD PRESSURE: 118 MMHG | OXYGEN SATURATION: 97 % | HEART RATE: 97 BPM | WEIGHT: 175 LBS | HEIGHT: 64 IN | TEMPERATURE: 97.7 F

## 2019-08-15 DIAGNOSIS — Z12.11 COLON CANCER SCREENING: ICD-10-CM

## 2019-08-15 DIAGNOSIS — R21 RASH: ICD-10-CM

## 2019-08-15 DIAGNOSIS — Z23 NEED FOR VACCINATION: ICD-10-CM

## 2019-08-15 PROCEDURE — 90471 IMMUNIZATION ADMIN: CPT | Performed by: FAMILY MEDICINE

## 2019-08-15 PROCEDURE — 90715 TDAP VACCINE 7 YRS/> IM: CPT | Performed by: FAMILY MEDICINE

## 2019-08-15 PROCEDURE — 99214 OFFICE O/P EST MOD 30 MIN: CPT | Mod: 25 | Performed by: FAMILY MEDICINE

## 2019-08-15 RX ORDER — NYSTATIN 100000 U/G
CREAM TOPICAL
Qty: 30 G | Refills: 0 | Status: SHIPPED | OUTPATIENT
Start: 2019-08-15 | End: 2019-09-12

## 2019-08-15 NOTE — ASSESSMENT & PLAN NOTE
This is a new condition. At the beginning (2-3 months ago) had a hemorrhoid that bled with wiping. He used preparation H. He developed itching around the anus and now the itching has spread over the buttocks. His wife states it looks like a diaper rash. There is an associated burning but not pain. He stopped the preparation H. He has tried neosporin, helped only a little. He tried vagisil, maybe helped.

## 2019-08-15 NOTE — PROGRESS NOTES
"Subjective:     CC: rash    HPI:   Cheikh is a patient of Dr. Gautam who presents today with rash    Rash  This is a new condition. At the beginning (2-3 months ago) had a hemorrhoid that bled with wiping. He used preparation H. He developed itching around the anus and now the itching has spread over the buttocks. His wife states it looks like a diaper rash. There is an associated burning but not pain. He stopped the preparation H. He has tried neosporin, helped only a little. He tried vagisil, maybe helped.      Past Medical History:   Diagnosis Date   • Hyperlipidemia    • Hypertension    • Sleep apnea        Social History     Tobacco Use   • Smoking status: Current Every Day Smoker     Packs/day: 1.00     Years: 34.00     Pack years: 34.00     Types: Cigarettes   • Smokeless tobacco: Never Used   • Tobacco comment: since age 25   Substance Use Topics   • Alcohol use: No   • Drug use: No       Current Outpatient Medications Ordered in Epic   Medication Sig Dispense Refill   • nystatin (MYCOSTATIN) 400590 UNIT/GM Cream topical cream Apply small amount to affected area twice daily for no more than 14 days 30 g 0   • amLODIPine (NORVASC) 10 MG Tab Take 1 Tab by mouth every day. 90 Tab 3   • losartan-hydrochlorothiazide (HYZAAR) 100-25 MG per tablet Take 1 Tab by mouth every day. 90 Tab 3   • triamcinolone acetonide (KENALOG) 0.1 % Cream Apply 1 Application to affected area(s) 2 times a day. (Patient not taking: Reported on 8/15/2019) 1 Tube 0     No current Epic-ordered facility-administered medications on file.        Allergies:  Patient has no known allergies.    Health Maintenance:     ROS:  Gen: no fevers/chills  Pulm: no sob  CV: no chest pain    Objective:       Exam:  /70   Pulse 97   Temp 36.5 °C (97.7 °F)   Resp 14   Ht 1.626 m (5' 4\")   Wt 79.4 kg (175 lb)   SpO2 97%   BMI 30.04 kg/m²  Body mass index is 30.04 kg/m².    Gen: Alert and oriented, No apparent distress.  Neck: Neck is supple " without lymphadenopathy.  Lungs: Normal effort, CTA bilaterally, no wheezes, rhonchi, or rales  CV: Regular rate and rhythm. No murmurs, rubs, or gallops.  Ext: No clubbing, cyanosis, edema.  Skin: Erythematous, scaly rash the gluteal cleft    Assessment & Plan:     59 y.o. male with the following -     1. Rash  Is a new condition.  Approximately 2-3 months ago after he went to the restroom with a bowel movement he noticed some blood on the tissue and soon he had a hemorrhoid that was bleeding.  Therefore he started using Preparation H cream and he developed some itching around the anus and now the itching is spread over the buttocks.  His wife states that it looks like a diaper rash when she looks at it.  He has some associated burning but no pain.  He stopped using the Preparation H and is tried Neosporin which helps only a little and he is tried Vagisil which helped only a little as well.  On exam he does have an erythematous, scaly rash in the gluteal cleft and I suspect a fungal infection.  -Try nystatin cream twice daily    2. Colon cancer screening  - REFERRAL TO GI FOR COLONOSCOPY    3. Need for vaccination  - TDAP VACCINE =>8YO IM      Return if symptoms worsen or fail to improve.    Please note that this dictation was created using voice recognition software. I have made every reasonable attempt to correct obvious errors, but I expect that there are errors of grammar and possibly content that I did not discover before finalizing the note.

## 2019-09-12 ENCOUNTER — OFFICE VISIT (OUTPATIENT)
Dept: MEDICAL GROUP | Facility: PHYSICIAN GROUP | Age: 59
End: 2019-09-12
Payer: COMMERCIAL

## 2019-09-12 VITALS
DIASTOLIC BLOOD PRESSURE: 66 MMHG | HEART RATE: 96 BPM | HEIGHT: 64 IN | RESPIRATION RATE: 16 BRPM | TEMPERATURE: 98.5 F | SYSTOLIC BLOOD PRESSURE: 120 MMHG | WEIGHT: 181 LBS | OXYGEN SATURATION: 96 % | BODY MASS INDEX: 30.9 KG/M2

## 2019-09-12 DIAGNOSIS — F17.210 CIGARETTE NICOTINE DEPENDENCE WITHOUT COMPLICATION: ICD-10-CM

## 2019-09-12 DIAGNOSIS — E66.9 OBESITY (BMI 30-39.9): ICD-10-CM

## 2019-09-12 DIAGNOSIS — I10 ESSENTIAL HYPERTENSION: Primary | ICD-10-CM

## 2019-09-12 DIAGNOSIS — Z11.59 NEED FOR HEPATITIS C SCREENING TEST: ICD-10-CM

## 2019-09-12 DIAGNOSIS — G47.33 OSA (OBSTRUCTIVE SLEEP APNEA): ICD-10-CM

## 2019-09-12 PROBLEM — R21 RASH: Status: RESOLVED | Noted: 2019-08-15 | Resolved: 2019-09-12

## 2019-09-12 PROCEDURE — 99214 OFFICE O/P EST MOD 30 MIN: CPT | Performed by: FAMILY MEDICINE

## 2019-09-12 NOTE — ASSESSMENT & PLAN NOTE
This is a chronic condition. He was recently diagnosed with JOSE and is working on getting his CPAP. He is following with sleep medicine.

## 2019-09-12 NOTE — ASSESSMENT & PLAN NOTE
This is a chronic condition.  Weight change: +6 lbs in 1 month  Diet: not healthy  Exercise: home gym daily 15-20 minutes

## 2019-09-12 NOTE — ASSESSMENT & PLAN NOTE
This is a chronic condition. He is smoking tobacco.  Years used: 34  Packs/day: 1 ppd  Previously quit: yes - for 1 week while cold turkey    Precontemplative vs contemplative about cessation.

## 2019-09-12 NOTE — PROGRESS NOTES
Subjective:     CC:  Diagnoses of Essential hypertension, Obesity (BMI 30-39.9), JOSE (obstructive sleep apnea), Cigarette nicotine dependence without complication, and Need for hepatitis C screening test were pertinent to this visit.    HISTORY OF THE PRESENT ILLNESS: Patient is a 59 y.o. male. This pleasant patient is here today to establish care. His prior PCP was Ashu Brandt MD.    Essential hypertension  This is a chronic condition.  Current Meds: losartan-HCTZ 100-25 mg qd, amlodipine 10 mg qd  Side effects: none  Home BP Log: none  Associated symptoms: no cp, no sob      Obesity (BMI 30-39.9)  This is a chronic condition.  Weight change: +6 lbs in 1 month  Diet: not healthy  Exercise: home gym daily 15-20 minutes      JOSE (obstructive sleep apnea)  This is a chronic condition. He was recently diagnosed with JOSE and is working on getting his CPAP. He is following with sleep medicine.    Cigarette nicotine dependence without complication  This is a chronic condition. He is smoking tobacco.  Years used: 34  Packs/day: 1 ppd  Previously quit: yes - for 1 week while cold turkey    Precontemplative vs contemplative about cessation.        Allergies: Patient has no known allergies.    Current Outpatient Medications Ordered in Epic   Medication Sig Dispense Refill   • amLODIPine (NORVASC) 10 MG Tab Take 1 Tab by mouth every day. 90 Tab 3   • losartan-hydrochlorothiazide (HYZAAR) 100-25 MG per tablet Take 1 Tab by mouth every day. 90 Tab 3     No current Epic-ordered facility-administered medications on file.        Past Medical History:   Diagnosis Date   • Hyperlipidemia    • Hypertension    • Sleep apnea        Past Surgical History:   Procedure Laterality Date   • DENTAL SURGERY         Social History     Tobacco Use   • Smoking status: Current Every Day Smoker     Packs/day: 1.00     Years: 34.00     Pack years: 34.00     Types: Cigarettes   • Smokeless tobacco: Never Used   • Tobacco comment: since age 25  "  Substance Use Topics   • Alcohol use: No   • Drug use: No       Social History     Social History Narrative   • Not on file       Family History   Problem Relation Age of Onset   • Diabetes Sister    • Diabetes Brother    • Cancer Neg Hx    • Heart Disease Neg Hx    • Stroke Neg Hx    • Alcohol/Drug Neg Hx        Health Maintenance:   Deferred influenza vaccine    ROS:   Gen: no fevers/chills, no changes in weight  Eyes: no changes in vision  ENT: no sore throat  Pulm: no cough  CV: no chest pain, no palpitations  GI: no diarrhea  : no dysuria  MSk: no myalgias  Skin: no rash  Neuro: no headaches      Objective:     Exam: /66   Pulse 96   Temp 36.9 °C (98.5 °F)   Resp 16   Ht 1.626 m (5' 4\")   Wt 82.1 kg (181 lb)   SpO2 96%  Body mass index is 31.07 kg/m².    General: Normal appearing. No distress.  HEENT: Normocephalic. Eyes conjunctiva clear lids without ptosis, pupils equal and reactive to light accommodation, ears normal shape and contour, canals are clear bilaterally, tympanic membranes are benign, oropharynx is without erythema, edema or exudates.   Neck: Supple without JVD. Thyroid is not enlarged.  Pulmonary: Clear to ausculation.  Normal effort. No rales, ronchi, or wheezing.  Cardiovascular: Regular rate and rhythm without murmur. Carotid and radial pulses are intact and equal bilaterally.  Abdomen: Soft, nontender, nondistended. Normal bowel sounds. Liver and spleen are not palpable  Neurologic: Grossly nonfocal  Lymph: No cervical or supraclavicular lymph nodes are palpable  Skin: Warm and dry.  No obvious lesions.  Musculoskeletal: Normal gait. No extremity cyanosis, clubbing, or edema.  Psych: Normal mood and affect. Alert and oriented x3. Judgment and insight is normal.      Assessment & Plan:   59 y.o. male with the following -    1. Essential hypertension  This is a chronic condition, controlled.  He is on losartan-hydrochlorothiazide and amlodipine, tolerating both without side " effect.  He no longer checks blood pressure at home.  His blood pressure today looks controlled.  - CBC WITH DIFFERENTIAL; Future  -Continue losartan-hydrochlorothiazide 100-25 mg daily  -Continue amlodipine 10 mg daily    2. Obesity (BMI 30-39.9)  This is a chronic condition, slightly worse.  He is gained 6 pounds in the last month.  He admits that his diet is not very healthy but he does work out in his home gym daily 15-20 minutes.  -We discussed the Mediterranean and the DASH diet  -We discussed portion control  -We discussed the AHA recommendations for exercise  -I encouraged him to make 5% weight loss his goal which would be 9 pounds based on his weight today    3. JOSE (obstructive sleep apnea)  This is a chronic condition, stable.  He was recently diagnosed with obstructive sleep apnea and is following sleep medicine.  He does not have a CPAP yet states that they are working on getting him CPAP.  -Continue to follow with sleep medicine as directed    4. Cigarette nicotine dependence without complication  This is a chronic condition, stable.  He has smoked a pack a day for approximately 34 years.  Currently he is in the contemplative versus pre-contemplative stage about cessation.  He did meet with the lung cancer screening team and it was recommended that he get a LDCT of the lung however does not appear in the imaging tab and is appears been done.  I encouraged him to call to schedule the lung screening.  - CBC WITH DIFFERENTIAL; Future    5. Need for hepatitis C screening test  - HCV Scrn ( 3991-8130 1xLife); Future      Return in about 6 months (around 3/12/2020) for Annual/wellness visit.    Please note that this dictation was created using voice recognition software. I have made every reasonable attempt to correct obvious errors, but I expect that there are errors of grammar and possibly content that I did not discover before finalizing the note.

## 2019-09-12 NOTE — ASSESSMENT & PLAN NOTE
This is a chronic condition.  Current Meds: losartan-HCTZ 100-25 mg qd, amlodipine 10 mg qd  Side effects: none  Home BP Log: none  Associated symptoms: no cp, no sob

## 2019-09-12 NOTE — PATIENT INSTRUCTIONS
For weight loss:  - a couple diets I recommend - Mediterranean and DASH diets  - work on portion size and eating smaller portions  - it is recommended you get 150 minutes of moderate aerobic activity per week. You should be able to talk but not sing during exercise  - 5% of your body weight today is 9 lbs, make that your first goal to lose    For lung cancer screening:  - [facility] - Lung Cancer Screening Program  [phone]  
Principal Discharge DX:	Vaginal delivery  Goal:	pp care  Instructions for follow-up, activity and diet:	per [facility]

## 2019-10-17 ENCOUNTER — OFFICE VISIT (OUTPATIENT)
Dept: MEDICAL GROUP | Facility: PHYSICIAN GROUP | Age: 59
End: 2019-10-17
Payer: COMMERCIAL

## 2019-10-17 VITALS
DIASTOLIC BLOOD PRESSURE: 82 MMHG | OXYGEN SATURATION: 96 % | WEIGHT: 179 LBS | HEART RATE: 102 BPM | HEIGHT: 64 IN | RESPIRATION RATE: 16 BRPM | TEMPERATURE: 96.7 F | SYSTOLIC BLOOD PRESSURE: 132 MMHG | BODY MASS INDEX: 30.56 KG/M2

## 2019-10-17 DIAGNOSIS — R21 RASH: Primary | ICD-10-CM

## 2019-10-17 DIAGNOSIS — K64.9 HEMORRHOIDS, UNSPECIFIED HEMORRHOID TYPE: ICD-10-CM

## 2019-10-17 PROCEDURE — 99214 OFFICE O/P EST MOD 30 MIN: CPT | Performed by: FAMILY MEDICINE

## 2019-10-17 RX ORDER — NYSTATIN 100000 U/G
CREAM TOPICAL
Qty: 30 G | Refills: 0 | Status: SHIPPED | OUTPATIENT
Start: 2019-10-17 | End: 2022-01-31

## 2019-10-17 NOTE — LETTER
[facility]  Puja Foster M.D.  [address]  [place] [address]  Fax: [phone]   Authorization for Release/Disclosure of   Protected Health Information   Name: CHEIKH CHACKO : 1960 SSN: [id number]   Address: [address]   [place] [address] Phone:    [phone] (home)    I authorize the entity listed below to release/disclose the PHI below to:   [facility]/Puja Foster M.D. and Puja Foster M.D.   Provider or Entity Name:  [facility]   Address   City, State, Zip   Phone:      Fax:     Reason for request: continuity of care   Information to be released:    [  ] LAST COLONOSCOPY,  including any PATH REPORT and follow-up  [  ] LAST FIT/COLOGUARD RESULT [  ] LAST DEXA  [  ] LAST MAMMOGRAM  [  ] LAST PAP  [  ] LAST LABS [  ] RETINA EXAM REPORT  [  ] IMMUNIZATION RECORDS  [  ] Release all info      [  ] Check here and initial the line next to each item to release ALL health information INCLUDING  _____ Care and treatment for drug and / or alcohol abuse  _____ HIV testing, infection status, or AIDS  _____ Genetic Testing    DATES OF SERVICE OR TIME PERIOD TO BE DISCLOSED: _____________  I understand and acknowledge that:  * This Authorization may be revoked at any time by you in writing, except if your health information has already been used or disclosed.  * Your health information that will be used or disclosed as a result of you signing this authorization could be re-disclosed by the recipient. If this occurs, your re-disclosed health information may no longer be protected by State or Federal laws.  * You may refuse to sign this Authorization. Your refusal will not affect your ability to obtain treatment.  * This Authorization becomes effective upon signing and will  on (date) __________.      If no date is indicated, this Authorization will  one (1) year from the signature date.    Name: Cheikh Chacko    Signature:   Date:     10/17/2019            PLEASE FAX REQUESTED RECORDS BACK TO: [phone]

## 2019-10-17 NOTE — PROGRESS NOTES
"Subjective:     CC: f/u rash    HPI:   Cheikh presents today with rash.    Rash  This is a chronic condition. For the last ~5 months he has had a fungal rash over his buttocks. It is incredibly itchy. It had gone away with nystatin, but then it has reappeared. He recently had a colonoscopy and was found to have internal and external hemorrhoids. He was told by the physician that every time he wipes, the hemorrhoids tear open and spreads the fungal infection.      Past Medical History:   Diagnosis Date   • Hyperlipidemia    • Hypertension    • Sleep apnea        Social History     Tobacco Use   • Smoking status: Current Every Day Smoker     Packs/day: 1.00     Years: 34.00     Pack years: 34.00     Types: Cigarettes   • Smokeless tobacco: Never Used   • Tobacco comment: since age 25   Substance Use Topics   • Alcohol use: No   • Drug use: No       Current Outpatient Medications Ordered in Epic   Medication Sig Dispense Refill   • nystatin (MYCOSTATIN) 633261 UNIT/GM Cream topical cream Apply to affected area twice daily for 14 days 30 g 0   • amLODIPine (NORVASC) 10 MG Tab Take 1 Tab by mouth every day. 90 Tab 3   • losartan-hydrochlorothiazide (HYZAAR) 100-25 MG per tablet Take 1 Tab by mouth every day. 90 Tab 3     No current Epic-ordered facility-administered medications on file.        Allergies:  Patient has no known allergies.    Health Maintenance: Completed    ROS:  Gen: no fevers/chills  Pulm: no sob  CV: no chest pain  : no dysuria    Objective:     Exam:  /82   Pulse (!) 102   Temp 35.9 °C (96.7 °F)   Resp 16   Ht 1.626 m (5' 4\")   Wt 81.2 kg (179 lb)   SpO2 96%   BMI 30.73 kg/m²  Body mass index is 30.73 kg/m².    Gen: Alert and oriented, No apparent distress.  Neck: Neck is supple without lymphadenopathy.  Lungs: Normal effort, CTA bilaterally, no wheezes, rhonchi, or rales  CV: Regular rate and rhythm. No murmurs, rubs, or gallops.  Ext: No clubbing, cyanosis, edema.    Assessment & " Plan:     59 y.o. male with the following -     1. Rash  This is an acute condition.  I saw him in August, 2019 with a fungal rash over the buttocks and the anus.  We were able to successfully treat with nystatin but then it has since reappeared.  It extremely red and itchy.  He reports that he recently had a colonoscopy and they did find internal and external hemorrhoids.  He was told by the physician that every time he wipes, the hemorrhoids will tear open and then spread the fungal infection which is why the candidal infection keeps recurring.  He has a rash right now and is extremely itchy.  -Nystatin cream twice daily    2. Hemorrhoids, unspecified hemorrhoid type  This is a new condition.  Recent colonoscopy did show external and internal hemorrhoids.  The GI doctors postulating that this is the cause for his recurring candidal skin infection.  They recommended he have the hemorrhoids removed but requires a referral for me for that.  - REFERRAL TO GASTROENTEROLOGY  -Preparation H cream as needed for itching associated with hemorrhoids    Return if symptoms worsen or fail to improve.    Please note that this dictation was created using voice recognition software. I have made every reasonable attempt to correct obvious errors, but I expect that there are errors of grammar and possibly content that I did not discover before finalizing the note.

## 2020-02-17 ENCOUNTER — HOSPITAL ENCOUNTER (INPATIENT)
Dept: HOSPITAL 8 - ED | Age: 60
LOS: 4 days | Discharge: HOME | DRG: 247 | End: 2020-02-21
Attending: INTERNAL MEDICINE | Admitting: HOSPITALIST
Payer: COMMERCIAL

## 2020-02-17 VITALS — HEIGHT: 64 IN | WEIGHT: 181.22 LBS | BODY MASS INDEX: 30.94 KG/M2

## 2020-02-17 VITALS — SYSTOLIC BLOOD PRESSURE: 154 MMHG | DIASTOLIC BLOOD PRESSURE: 90 MMHG

## 2020-02-17 DIAGNOSIS — D68.69: ICD-10-CM

## 2020-02-17 DIAGNOSIS — I21.4: Primary | ICD-10-CM

## 2020-02-17 DIAGNOSIS — I48.92: ICD-10-CM

## 2020-02-17 DIAGNOSIS — I25.10: ICD-10-CM

## 2020-02-17 DIAGNOSIS — I10: ICD-10-CM

## 2020-02-17 DIAGNOSIS — I48.0: ICD-10-CM

## 2020-02-17 DIAGNOSIS — F17.210: ICD-10-CM

## 2020-02-17 DIAGNOSIS — I44.7: ICD-10-CM

## 2020-02-17 DIAGNOSIS — D72.829: ICD-10-CM

## 2020-02-17 LAB
ALBUMIN SERPL-MCNC: 3.7 G/DL (ref 3.4–5)
ALP SERPL-CCNC: 50 U/L (ref 45–117)
ALT SERPL-CCNC: 45 U/L (ref 12–78)
ANION GAP SERPL CALC-SCNC: 10 MMOL/L (ref 5–15)
BASOPHILS # BLD AUTO: 0.04 X10^3/UL (ref 0–0.1)
BASOPHILS NFR BLD AUTO: 0 % (ref 0–1)
BILIRUB SERPL-MCNC: 0.5 MG/DL (ref 0.2–1)
CALCIUM SERPL-MCNC: 8.6 MG/DL (ref 8.5–10.1)
CHLORIDE SERPL-SCNC: 105 MMOL/L (ref 98–107)
CREAT SERPL-MCNC: 1.24 MG/DL (ref 0.7–1.3)
EOSINOPHIL # BLD AUTO: 0.27 X10^3/UL (ref 0–0.4)
EOSINOPHIL NFR BLD AUTO: 3 % (ref 1–7)
ERYTHROCYTE [DISTWIDTH] IN BLOOD BY AUTOMATED COUNT: 12.9 % (ref 9.4–14.8)
LYMPHOCYTES # BLD AUTO: 3.16 X10^3/UL (ref 1–3.4)
LYMPHOCYTES NFR BLD AUTO: 30 % (ref 22–44)
MCH RBC QN AUTO: 32.3 PG (ref 27.5–34.5)
MCHC RBC AUTO-ENTMCNC: 34 G/DL (ref 33.2–36.2)
MCV RBC AUTO: 94.9 FL (ref 81–97)
MD: NO
MONOCYTES # BLD AUTO: 1.16 X10^3/UL (ref 0.2–0.8)
MONOCYTES NFR BLD AUTO: 11 % (ref 2–9)
NEUTROPHILS # BLD AUTO: 5.93 X10^3/UL (ref 1.8–6.8)
NEUTROPHILS NFR BLD AUTO: 56 % (ref 42–75)
PLATELET # BLD AUTO: 219 X10^3/UL (ref 130–400)
PMV BLD AUTO: 8.1 FL (ref 7.4–10.4)
PROT SERPL-MCNC: 7.3 G/DL (ref 6.4–8.2)
RBC # BLD AUTO: 5.13 X10^6/UL (ref 4.38–5.82)
TROPONIN I SERPL-MCNC: 0.07 NG/ML (ref 0–0.04)

## 2020-02-17 PROCEDURE — 84443 ASSAY THYROID STIM HORMONE: CPT

## 2020-02-17 PROCEDURE — 84484 ASSAY OF TROPONIN QUANT: CPT

## 2020-02-17 PROCEDURE — 71046 X-RAY EXAM CHEST 2 VIEWS: CPT

## 2020-02-17 PROCEDURE — 93005 ELECTROCARDIOGRAM TRACING: CPT

## 2020-02-17 PROCEDURE — C1769 GUIDE WIRE: HCPCS

## 2020-02-17 PROCEDURE — 99156 MOD SED OTH PHYS/QHP 5/>YRS: CPT

## 2020-02-17 PROCEDURE — 93306 TTE W/DOPPLER COMPLETE: CPT

## 2020-02-17 PROCEDURE — 99157 MOD SED OTHER PHYS/QHP EA: CPT

## 2020-02-17 PROCEDURE — 80061 LIPID PANEL: CPT

## 2020-02-17 PROCEDURE — 93356 MYOCRD STRAIN IMG SPCKL TRCK: CPT

## 2020-02-17 PROCEDURE — 83735 ASSAY OF MAGNESIUM: CPT

## 2020-02-17 PROCEDURE — C1887 CATHETER, GUIDING: HCPCS

## 2020-02-17 PROCEDURE — 83036 HEMOGLOBIN GLYCOSYLATED A1C: CPT

## 2020-02-17 PROCEDURE — C1874 STENT, COATED/COV W/DEL SYS: HCPCS

## 2020-02-17 PROCEDURE — 85025 COMPLETE CBC W/AUTO DIFF WBC: CPT

## 2020-02-17 PROCEDURE — C1725 CATH, TRANSLUMIN NON-LASER: HCPCS

## 2020-02-17 PROCEDURE — 80048 BASIC METABOLIC PNL TOTAL CA: CPT

## 2020-02-17 PROCEDURE — 99291 CRITICAL CARE FIRST HOUR: CPT

## 2020-02-17 PROCEDURE — 93454 CORONARY ARTERY ANGIO S&I: CPT

## 2020-02-17 PROCEDURE — 80053 COMPREHEN METABOLIC PANEL: CPT

## 2020-02-17 PROCEDURE — C9600 PERC DRUG-EL COR STENT SING: HCPCS

## 2020-02-17 PROCEDURE — 96374 THER/PROPH/DIAG INJ IV PUSH: CPT

## 2020-02-17 PROCEDURE — 71045 X-RAY EXAM CHEST 1 VIEW: CPT

## 2020-02-17 PROCEDURE — C1760 CLOSURE DEV, VASC: HCPCS

## 2020-02-17 PROCEDURE — C1894 INTRO/SHEATH, NON-LASER: HCPCS

## 2020-02-17 PROCEDURE — 36415 COLL VENOUS BLD VENIPUNCTURE: CPT

## 2020-02-17 NOTE — NUR
THIS IS A 58 YO MALE COMING IN FOR INTERMITTENT CHEST PAIN STARTING FRIDAY. 
TODAY THE PAIN BECAME CONSTANT. PAIN IS LOCATED STERNALLY, WITH NO RADIATION, 
DESCRIBED AT PRESSURE ON CHEST, RATED 6-10 AT THIS TIME. DENIES SOB, DENIES 
N/V. CSM INTACT BILATERAL UE AND LE. NO EDEMA NOTED. A&OX4, VSS, NAD AT THIS 
TIME. ALL MONITORING IN PLACE, NSR ON MONITOR. CALL LIGHT IN REACH, SPOUSE IN 
ROOM.

## 2020-02-18 VITALS — DIASTOLIC BLOOD PRESSURE: 74 MMHG | SYSTOLIC BLOOD PRESSURE: 129 MMHG

## 2020-02-18 VITALS — DIASTOLIC BLOOD PRESSURE: 84 MMHG | SYSTOLIC BLOOD PRESSURE: 146 MMHG

## 2020-02-18 VITALS — DIASTOLIC BLOOD PRESSURE: 79 MMHG | SYSTOLIC BLOOD PRESSURE: 128 MMHG

## 2020-02-18 VITALS — DIASTOLIC BLOOD PRESSURE: 79 MMHG | SYSTOLIC BLOOD PRESSURE: 125 MMHG

## 2020-02-18 VITALS — SYSTOLIC BLOOD PRESSURE: 133 MMHG | DIASTOLIC BLOOD PRESSURE: 81 MMHG

## 2020-02-18 LAB
ALBUMIN SERPL-MCNC: 3.9 G/DL (ref 3.4–5)
ALP SERPL-CCNC: 54 U/L (ref 45–117)
ALT SERPL-CCNC: 49 U/L (ref 12–78)
ANION GAP SERPL CALC-SCNC: 6 MMOL/L (ref 5–15)
BASOPHILS # BLD AUTO: 0.11 X10^3/UL (ref 0–0.1)
BASOPHILS NFR BLD AUTO: 1 % (ref 0–1)
BILIRUB SERPL-MCNC: 0.6 MG/DL (ref 0.2–1)
CALCIUM SERPL-MCNC: 8.6 MG/DL (ref 8.5–10.1)
CHLORIDE SERPL-SCNC: 105 MMOL/L (ref 98–107)
CHOL/HDL RATIO: 3.3
CREAT SERPL-MCNC: 1.12 MG/DL (ref 0.7–1.3)
EOSINOPHIL # BLD AUTO: 0.18 X10^3/UL (ref 0–0.4)
EOSINOPHIL NFR BLD AUTO: 1 % (ref 1–7)
ERYTHROCYTE [DISTWIDTH] IN BLOOD BY AUTOMATED COUNT: 13 % (ref 9.4–14.8)
EST. AVERAGE GLUCOSE BLD GHB EST-MCNC: 128 MG/DL (ref 0–126)
HDL CHOL %: 30 % (ref 26–37)
HDL CHOLESTEROL (DIRECT): 52 MG/DL (ref 40–60)
LDL CHOLESTEROL,CALCULATED: 81 MG/DL (ref 54–169)
LDLC/HDLC SERPL: 1.6 {RATIO} (ref 0.5–3)
LYMPHOCYTES # BLD AUTO: 2.83 X10^3/UL (ref 1–3.4)
LYMPHOCYTES NFR BLD AUTO: 21 % (ref 22–44)
MCH RBC QN AUTO: 32.3 PG (ref 27.5–34.5)
MCHC RBC AUTO-ENTMCNC: 33.6 G/DL (ref 33.2–36.2)
MCV RBC AUTO: 96.2 FL (ref 81–97)
MD: NO
MONOCYTES # BLD AUTO: 1.26 X10^3/UL (ref 0.2–0.8)
MONOCYTES NFR BLD AUTO: 10 % (ref 2–9)
NEUTROPHILS # BLD AUTO: 8.86 X10^3/UL (ref 1.8–6.8)
NEUTROPHILS NFR BLD AUTO: 67 % (ref 42–75)
PLATELET # BLD AUTO: 199 X10^3/UL (ref 130–400)
PMV BLD AUTO: 8.2 FL (ref 7.4–10.4)
PROT SERPL-MCNC: 7.7 G/DL (ref 6.4–8.2)
RBC # BLD AUTO: 5.15 X10^6/UL (ref 4.38–5.82)
TRIGL SERPL-MCNC: 203 MG/DL (ref 50–200)
TROPONIN I SERPL-MCNC: 7.79 NG/ML (ref 0–0.04)
VLDLC SERPL CALC-MCNC: 41 MG/DL (ref 0–25)

## 2020-02-18 PROCEDURE — B211YZZ FLUOROSCOPY OF MULTIPLE CORONARY ARTERIES USING OTHER CONTRAST: ICD-10-PCS | Performed by: INTERNAL MEDICINE

## 2020-02-18 PROCEDURE — 027135Z DILATION OF CORONARY ARTERY, TWO ARTERIES WITH TWO DRUG-ELUTING INTRALUMINAL DEVICES, PERCUTANEOUS APPROACH: ICD-10-PCS | Performed by: INTERNAL MEDICINE

## 2020-02-18 PROCEDURE — 4A023N7 MEASUREMENT OF CARDIAC SAMPLING AND PRESSURE, LEFT HEART, PERCUTANEOUS APPROACH: ICD-10-PCS | Performed by: INTERNAL MEDICINE

## 2020-02-18 RX ADMIN — NITROGLYCERIN SCH APPLIC: 20 OINTMENT TOPICAL at 10:30

## 2020-02-18 RX ADMIN — NITROGLYCERIN SCH APPLIC: 20 OINTMENT TOPICAL at 15:28

## 2020-02-18 RX ADMIN — MORPHINE SULFATE PRN MG: 10 INJECTION INTRAVENOUS at 13:03

## 2020-02-18 RX ADMIN — ATORVASTATIN CALCIUM SCH MG: 40 TABLET, FILM COATED ORAL at 19:47

## 2020-02-18 RX ADMIN — TICAGRELOR SCH MG: 90 TABLET ORAL at 19:47

## 2020-02-18 RX ADMIN — MORPHINE SULFATE PRN MG: 10 INJECTION INTRAVENOUS at 05:24

## 2020-02-18 RX ADMIN — NITROGLYCERIN SCH APPLIC: 20 OINTMENT TOPICAL at 04:59

## 2020-02-18 RX ADMIN — NITROGLYCERIN SCH APPLIC: 20 OINTMENT TOPICAL at 22:30

## 2020-02-18 RX ADMIN — LOSARTAN POTASSIUM SCH MG: 50 TABLET, FILM COATED ORAL at 09:00

## 2020-02-18 RX ADMIN — MORPHINE SULFATE PRN MG: 10 INJECTION INTRAVENOUS at 12:48

## 2020-02-18 RX ADMIN — NICOTINE SCH PATCH: 21 PATCH, EXTENDED RELEASE TRANSDERMAL at 04:59

## 2020-02-19 VITALS — DIASTOLIC BLOOD PRESSURE: 79 MMHG | SYSTOLIC BLOOD PRESSURE: 117 MMHG

## 2020-02-19 VITALS — DIASTOLIC BLOOD PRESSURE: 74 MMHG | SYSTOLIC BLOOD PRESSURE: 113 MMHG

## 2020-02-19 VITALS — DIASTOLIC BLOOD PRESSURE: 69 MMHG | SYSTOLIC BLOOD PRESSURE: 109 MMHG

## 2020-02-19 VITALS — DIASTOLIC BLOOD PRESSURE: 81 MMHG | SYSTOLIC BLOOD PRESSURE: 130 MMHG

## 2020-02-19 LAB
ANION GAP SERPL CALC-SCNC: 6 MMOL/L (ref 5–15)
CALCIUM SERPL-MCNC: 8.7 MG/DL (ref 8.5–10.1)
CHLORIDE SERPL-SCNC: 110 MMOL/L (ref 98–107)
CREAT SERPL-MCNC: 0.99 MG/DL (ref 0.7–1.3)

## 2020-02-19 RX ADMIN — APIXABAN SCH MG: 5 TABLET, FILM COATED ORAL at 09:02

## 2020-02-19 RX ADMIN — LOSARTAN POTASSIUM SCH MG: 50 TABLET, FILM COATED ORAL at 09:02

## 2020-02-19 RX ADMIN — ATORVASTATIN CALCIUM SCH MG: 40 TABLET, FILM COATED ORAL at 22:28

## 2020-02-19 RX ADMIN — CARVEDILOL SCH MG: 3.12 TABLET, FILM COATED ORAL at 14:12

## 2020-02-19 RX ADMIN — APIXABAN SCH MG: 5 TABLET, FILM COATED ORAL at 22:28

## 2020-02-19 RX ADMIN — NITROGLYCERIN SCH APPLIC: 20 OINTMENT TOPICAL at 22:30

## 2020-02-19 RX ADMIN — NICOTINE SCH PATCH: 21 PATCH, EXTENDED RELEASE TRANSDERMAL at 09:00

## 2020-02-19 RX ADMIN — TICAGRELOR SCH MG: 90 TABLET ORAL at 22:28

## 2020-02-19 RX ADMIN — NITROGLYCERIN SCH APPLIC: 20 OINTMENT TOPICAL at 15:36

## 2020-02-19 RX ADMIN — ASPIRIN SCH MG: 81 TABLET, COATED ORAL at 09:02

## 2020-02-19 RX ADMIN — NITROGLYCERIN SCH APPLIC: 20 OINTMENT TOPICAL at 03:37

## 2020-02-19 RX ADMIN — CARVEDILOL SCH MG: 3.12 TABLET, FILM COATED ORAL at 09:02

## 2020-02-19 RX ADMIN — NITROGLYCERIN SCH APPLIC: 20 OINTMENT TOPICAL at 11:09

## 2020-02-19 RX ADMIN — TICAGRELOR SCH MG: 90 TABLET ORAL at 09:02

## 2020-02-19 RX ADMIN — CARVEDILOL SCH MG: 3.12 TABLET, FILM COATED ORAL at 22:28

## 2020-02-20 VITALS — SYSTOLIC BLOOD PRESSURE: 139 MMHG | DIASTOLIC BLOOD PRESSURE: 80 MMHG

## 2020-02-20 VITALS — DIASTOLIC BLOOD PRESSURE: 83 MMHG | SYSTOLIC BLOOD PRESSURE: 119 MMHG

## 2020-02-20 VITALS — SYSTOLIC BLOOD PRESSURE: 114 MMHG | DIASTOLIC BLOOD PRESSURE: 77 MMHG

## 2020-02-20 VITALS — DIASTOLIC BLOOD PRESSURE: 75 MMHG | SYSTOLIC BLOOD PRESSURE: 114 MMHG

## 2020-02-20 VITALS — SYSTOLIC BLOOD PRESSURE: 136 MMHG | DIASTOLIC BLOOD PRESSURE: 85 MMHG

## 2020-02-20 VITALS — SYSTOLIC BLOOD PRESSURE: 122 MMHG | DIASTOLIC BLOOD PRESSURE: 77 MMHG

## 2020-02-20 RX ADMIN — LOSARTAN POTASSIUM SCH MG: 50 TABLET, FILM COATED ORAL at 09:30

## 2020-02-20 RX ADMIN — NITROGLYCERIN SCH APPLIC: 20 OINTMENT TOPICAL at 22:02

## 2020-02-20 RX ADMIN — NICOTINE SCH PATCH: 21 PATCH, EXTENDED RELEASE TRANSDERMAL at 09:35

## 2020-02-20 RX ADMIN — TICAGRELOR SCH MG: 90 TABLET ORAL at 20:13

## 2020-02-20 RX ADMIN — APIXABAN SCH MG: 5 TABLET, FILM COATED ORAL at 20:13

## 2020-02-20 RX ADMIN — TICAGRELOR SCH MG: 90 TABLET ORAL at 09:35

## 2020-02-20 RX ADMIN — ATORVASTATIN CALCIUM SCH MG: 40 TABLET, FILM COATED ORAL at 20:13

## 2020-02-20 RX ADMIN — ASPIRIN SCH MG: 81 TABLET, COATED ORAL at 09:30

## 2020-02-20 RX ADMIN — CARVEDILOL SCH MG: 3.12 TABLET, FILM COATED ORAL at 21:47

## 2020-02-20 RX ADMIN — NITROGLYCERIN SCH APPLIC: 20 OINTMENT TOPICAL at 04:15

## 2020-02-20 RX ADMIN — NITROGLYCERIN SCH APPLIC: 20 OINTMENT TOPICAL at 16:30

## 2020-02-20 RX ADMIN — CARVEDILOL SCH MG: 3.12 TABLET, FILM COATED ORAL at 13:53

## 2020-02-20 RX ADMIN — APIXABAN SCH MG: 5 TABLET, FILM COATED ORAL at 09:30

## 2020-02-20 RX ADMIN — CARVEDILOL SCH MG: 3.12 TABLET, FILM COATED ORAL at 05:40

## 2020-02-20 RX ADMIN — NITROGLYCERIN SCH APPLIC: 20 OINTMENT TOPICAL at 09:39

## 2020-02-21 VITALS — DIASTOLIC BLOOD PRESSURE: 82 MMHG | SYSTOLIC BLOOD PRESSURE: 144 MMHG

## 2020-02-21 VITALS — SYSTOLIC BLOOD PRESSURE: 117 MMHG | DIASTOLIC BLOOD PRESSURE: 79 MMHG

## 2020-02-21 VITALS — SYSTOLIC BLOOD PRESSURE: 114 MMHG | DIASTOLIC BLOOD PRESSURE: 73 MMHG

## 2020-02-21 LAB
ALBUMIN SERPL-MCNC: 3.1 G/DL (ref 3.4–5)
ALP SERPL-CCNC: 40 U/L (ref 45–117)
ALT SERPL-CCNC: 38 U/L (ref 12–78)
ANION GAP SERPL CALC-SCNC: 7 MMOL/L (ref 5–15)
BASOPHILS # BLD AUTO: 0.05 X10^3/UL (ref 0–0.1)
BASOPHILS NFR BLD AUTO: 1 % (ref 0–1)
BILIRUB SERPL-MCNC: 0.7 MG/DL (ref 0.2–1)
CALCIUM SERPL-MCNC: 8.3 MG/DL (ref 8.5–10.1)
CHLORIDE SERPL-SCNC: 109 MMOL/L (ref 98–107)
CREAT SERPL-MCNC: 0.94 MG/DL (ref 0.7–1.3)
EOSINOPHIL # BLD AUTO: 0.31 X10^3/UL (ref 0–0.4)
EOSINOPHIL NFR BLD AUTO: 3 % (ref 1–7)
ERYTHROCYTE [DISTWIDTH] IN BLOOD BY AUTOMATED COUNT: 12.8 % (ref 9.4–14.8)
LYMPHOCYTES # BLD AUTO: 2.77 X10^3/UL (ref 1–3.4)
LYMPHOCYTES NFR BLD AUTO: 27 % (ref 22–44)
MCH RBC QN AUTO: 32.1 PG (ref 27.5–34.5)
MCHC RBC AUTO-ENTMCNC: 33.5 G/DL (ref 33.2–36.2)
MCV RBC AUTO: 95.9 FL (ref 81–97)
MD: NO
MONOCYTES # BLD AUTO: 1.1 X10^3/UL (ref 0.2–0.8)
MONOCYTES NFR BLD AUTO: 11 % (ref 2–9)
NEUTROPHILS # BLD AUTO: 6.1 X10^3/UL (ref 1.8–6.8)
NEUTROPHILS NFR BLD AUTO: 59 % (ref 42–75)
PLATELET # BLD AUTO: 183 X10^3/UL (ref 130–400)
PMV BLD AUTO: 8.2 FL (ref 7.4–10.4)
PROT SERPL-MCNC: 6.6 G/DL (ref 6.4–8.2)
RBC # BLD AUTO: 4.61 X10^6/UL (ref 4.38–5.82)

## 2020-02-21 RX ADMIN — APIXABAN SCH MG: 5 TABLET, FILM COATED ORAL at 10:03

## 2020-02-21 RX ADMIN — NITROGLYCERIN SCH APPLIC: 20 OINTMENT TOPICAL at 10:04

## 2020-02-21 RX ADMIN — NICOTINE SCH PATCH: 21 PATCH, EXTENDED RELEASE TRANSDERMAL at 10:03

## 2020-02-21 RX ADMIN — TICAGRELOR SCH MG: 90 TABLET ORAL at 10:03

## 2020-02-21 RX ADMIN — LOSARTAN POTASSIUM SCH MG: 50 TABLET, FILM COATED ORAL at 10:03

## 2020-02-21 RX ADMIN — CARVEDILOL SCH MG: 3.12 TABLET, FILM COATED ORAL at 05:47

## 2020-02-21 RX ADMIN — NITROGLYCERIN SCH APPLIC: 20 OINTMENT TOPICAL at 04:30

## 2020-02-21 RX ADMIN — ASPIRIN SCH MG: 81 TABLET, COATED ORAL at 10:03

## 2020-06-04 ENCOUNTER — HOSPITAL ENCOUNTER (OUTPATIENT)
Dept: HOSPITAL 8 - CFH | Age: 60
Discharge: HOME | End: 2020-06-04
Attending: INTERNAL MEDICINE
Payer: COMMERCIAL

## 2020-06-04 DIAGNOSIS — E78.5: ICD-10-CM

## 2020-06-04 DIAGNOSIS — I08.1: Primary | ICD-10-CM

## 2020-06-04 DIAGNOSIS — Z87.891: ICD-10-CM

## 2020-06-04 DIAGNOSIS — I25.10: ICD-10-CM

## 2020-06-04 DIAGNOSIS — I10: ICD-10-CM

## 2020-06-04 PROCEDURE — 93356 MYOCRD STRAIN IMG SPCKL TRCK: CPT

## 2020-06-04 PROCEDURE — 93306 TTE W/DOPPLER COMPLETE: CPT

## 2020-06-16 ENCOUNTER — HOSPITAL ENCOUNTER (OUTPATIENT)
Dept: HOSPITAL 8 - CFH | Age: 60
Discharge: HOME | End: 2020-06-16
Attending: INTERNAL MEDICINE
Payer: COMMERCIAL

## 2020-06-16 DIAGNOSIS — I21.09: Primary | ICD-10-CM

## 2020-06-16 DIAGNOSIS — I25.10: ICD-10-CM

## 2020-06-16 DIAGNOSIS — I48.91: ICD-10-CM

## 2020-06-16 PROCEDURE — 93017 CV STRESS TEST TRACING ONLY: CPT

## 2020-06-16 PROCEDURE — A9502 TC99M TETROFOSMIN: HCPCS

## 2020-06-16 PROCEDURE — 78452 HT MUSCLE IMAGE SPECT MULT: CPT

## 2021-02-16 NOTE — ASSESSMENT & PLAN NOTE
This is an acute condition. For the last ~5 months he has had a fungal rash over his buttocks. It is incredibly itchy. It had gone away with nystatin, but then it has reappeared. He recently had a colonoscopy and was found to have internal and external hemorrhoids. He was told by the physician that every time he wipes, the hemorrhoids tear open and spreads the fungal infection.  
none known

## 2021-03-15 DIAGNOSIS — Z23 NEED FOR VACCINATION: ICD-10-CM

## 2022-01-31 ENCOUNTER — OFFICE VISIT (OUTPATIENT)
Dept: MEDICAL GROUP | Facility: MEDICAL CENTER | Age: 62
End: 2022-01-31
Payer: COMMERCIAL

## 2022-01-31 VITALS
HEART RATE: 92 BPM | BODY MASS INDEX: 32.74 KG/M2 | OXYGEN SATURATION: 97 % | DIASTOLIC BLOOD PRESSURE: 76 MMHG | SYSTOLIC BLOOD PRESSURE: 136 MMHG | HEIGHT: 64 IN | TEMPERATURE: 96.8 F | WEIGHT: 191.8 LBS

## 2022-01-31 DIAGNOSIS — Z95.5 S/P PRIMARY ANGIOPLASTY WITH CORONARY STENT: ICD-10-CM

## 2022-01-31 DIAGNOSIS — G47.33 OSA (OBSTRUCTIVE SLEEP APNEA): ICD-10-CM

## 2022-01-31 DIAGNOSIS — E11.9 TYPE 2 DIABETES MELLITUS WITHOUT COMPLICATION, WITHOUT LONG-TERM CURRENT USE OF INSULIN (HCC): ICD-10-CM

## 2022-01-31 DIAGNOSIS — Z12.2 SCREENING FOR LUNG CANCER: ICD-10-CM

## 2022-01-31 DIAGNOSIS — E66.9 OBESITY (BMI 30-39.9): ICD-10-CM

## 2022-01-31 DIAGNOSIS — I21.09 ST ELEVATION (STEMI) MYOCARDIAL INFARCTION INVOLVING OTHER CORONARY ARTERY OF ANTERIOR WALL (HCC): ICD-10-CM

## 2022-01-31 DIAGNOSIS — I10 ESSENTIAL HYPERTENSION: ICD-10-CM

## 2022-01-31 DIAGNOSIS — F17.210 SMOKING GREATER THAN 30 PACK YEARS: ICD-10-CM

## 2022-01-31 DIAGNOSIS — Z12.5 SCREENING FOR PROSTATE CANCER: ICD-10-CM

## 2022-01-31 DIAGNOSIS — Z11.59 NEED FOR HEPATITIS C SCREENING TEST: ICD-10-CM

## 2022-01-31 DIAGNOSIS — E78.2 MIXED HYPERLIPIDEMIA: ICD-10-CM

## 2022-01-31 PROBLEM — L98.9 SKIN LESIONS: Status: RESOLVED | Noted: 2019-02-26 | Resolved: 2022-01-31

## 2022-01-31 PROBLEM — R21 RASH: Status: RESOLVED | Noted: 2019-08-15 | Resolved: 2022-01-31

## 2022-01-31 PROBLEM — Z71.6 TOBACCO ABUSE COUNSELING: Status: RESOLVED | Noted: 2019-05-08 | Resolved: 2022-01-31

## 2022-01-31 PROBLEM — L30.9 ECZEMA: Status: RESOLVED | Noted: 2019-02-26 | Resolved: 2022-01-31

## 2022-01-31 PROCEDURE — 99214 OFFICE O/P EST MOD 30 MIN: CPT | Performed by: FAMILY MEDICINE

## 2022-01-31 RX ORDER — CARVEDILOL 12.5 MG/1
TABLET ORAL
COMMUNITY
Start: 2019-02-11 | End: 2022-01-31 | Stop reason: SDUPTHER

## 2022-01-31 RX ORDER — ATORVASTATIN CALCIUM 80 MG/1
TABLET, FILM COATED ORAL
COMMUNITY
Start: 2019-02-11 | End: 2022-01-31 | Stop reason: SDUPTHER

## 2022-01-31 RX ORDER — CLOPIDOGREL BISULFATE 75 MG/1
75 TABLET ORAL DAILY
Qty: 90 TABLET | Refills: 0 | Status: SHIPPED | OUTPATIENT
Start: 2022-01-31 | End: 2022-02-10

## 2022-01-31 RX ORDER — LOSARTAN POTASSIUM AND HYDROCHLOROTHIAZIDE 25; 100 MG/1; MG/1
1 TABLET ORAL DAILY
Qty: 90 TABLET | Refills: 3 | Status: SHIPPED | OUTPATIENT
Start: 2022-01-31 | End: 2022-09-07 | Stop reason: SDUPTHER

## 2022-01-31 RX ORDER — ATORVASTATIN CALCIUM 80 MG/1
80 TABLET, FILM COATED ORAL DAILY
Qty: 90 TABLET | Refills: 3 | Status: SHIPPED | OUTPATIENT
Start: 2022-01-31 | End: 2023-03-09 | Stop reason: SDUPTHER

## 2022-01-31 RX ORDER — CARVEDILOL 12.5 MG/1
12.5 TABLET ORAL 2 TIMES DAILY WITH MEALS
Qty: 180 TABLET | Refills: 3 | Status: SHIPPED | OUTPATIENT
Start: 2022-01-31 | End: 2022-02-10 | Stop reason: SDUPTHER

## 2022-01-31 SDOH — ECONOMIC STABILITY: HOUSING INSECURITY

## 2022-01-31 SDOH — HEALTH STABILITY: MENTAL HEALTH

## 2022-01-31 SDOH — HEALTH STABILITY: PHYSICAL HEALTH

## 2022-01-31 SDOH — ECONOMIC STABILITY: TRANSPORTATION INSECURITY

## 2022-01-31 ASSESSMENT — ENCOUNTER SYMPTOMS
DIZZINESS: 0
ABDOMINAL PAIN: 0
HEADACHES: 0
COUGH: 0
HEMOPTYSIS: 0
WHEEZING: 0
PALPITATIONS: 0
NERVOUS/ANXIOUS: 0
FOCAL WEAKNESS: 0
SENSORY CHANGE: 0
FEVER: 0
BLOOD IN STOOL: 0
CHILLS: 0
MYALGIAS: 0
SORE THROAT: 0
CONSTIPATION: 0
NAUSEA: 0
VOMITING: 0
DIARRHEA: 0
SHORTNESS OF BREATH: 0
DEPRESSION: 0

## 2022-01-31 ASSESSMENT — PATIENT HEALTH QUESTIONNAIRE - PHQ9: CLINICAL INTERPRETATION OF PHQ2 SCORE: 0

## 2022-01-31 NOTE — PROGRESS NOTES
FAMILY MEDICINE VISIT                                                               Chief complaint::Diagnoses of ST elevation (STEMI) myocardial infarction involving other coronary artery of anterior wall (HCC), Essential hypertension, Type 2 diabetes mellitus without complication, without long-term current use of insulin ([facility]), Mixed hyperlipidemia, Smoking greater than 30 pack years, Screening for lung cancer, JOSE (obstructive sleep apnea), S/P primary angioplasty with coronary stent, Need for hepatitis C screening test, Screening for prostate cancer, and Obesity (BMI 30-39.9) were pertinent to this visit.    History of present illness: Cheikh Chacko is a 61 y.o. male who presented to hospitals care.  Previous PCP and cardiologist at [place].    Problem   St Elevation (Stemi) Myocardial Infarction Involving Other Coronary Artery of Anterior Wall (Hcc)    Was following with cardiology at [place], s/p coronary artery stent in 2019.  On Plavix 75 mg daily and Coreg 12.5 mg twice daily.     Type 2 Diabetes Mellitus Without Complication, Without Long-Term Current Use of Insulin ([facility])    History of diabetes, currently not on any medication, last A1c at 6.8.     Mixed Hyperlipidemia    He is currently on Lipitor 80 mg daily.  No side effects with medication.    Lab Results   Component Value Date/Time    CHOLSTRLTOT 169 07/19/2018 1247    TRIGLYCERIDE 129 07/19/2018 1247    HDL 45 07/19/2018 1247    LDL 98 07/19/2018 1247        S/P Primary Angioplasty With Coronary Stent    Reports that he got stent placed in 2019 at [place].  Stent is resolute jorge, Zotarolimus- eluting     Jose (Obstructive Sleep Apnea)    History of sleep apnea, reports that he was not using sleep apnea machine persistently so the company took away his machine.  He still snores a lot at night     Essential Hypertension    He is on Coreg 12.5 mg twice daily and hyzaar 1 tablet daily.  No side effects with this medication.  Blood pressure  "today 136/76.  No chest pain, palpitations, shortness of breath, lower extremity swelling.       Smoking Greater Than 30 Pack Years    Quit smoking 3 years ago, smoked 1 pack for 34 years.  Denies any symptoms.     Obesity (Bmi 30-39.9)    Reports working on weight loss.  Is physically active, tries to eat healthy diet                       Review of systems:     Review of Systems   Constitutional: Negative for chills, fever and malaise/fatigue.   HENT: Negative for congestion, hearing loss, nosebleeds and sore throat.    Respiratory: Negative for cough, hemoptysis, shortness of breath and wheezing.    Cardiovascular: Negative for chest pain, palpitations and leg swelling.   Gastrointestinal: Negative for abdominal pain, blood in stool, constipation, diarrhea, nausea and vomiting.   Genitourinary: Negative for dysuria, frequency and urgency.   Musculoskeletal: Negative for myalgias.   Skin: Negative for rash.   Neurological: Negative for dizziness, sensory change, focal weakness and headaches.   Psychiatric/Behavioral: Negative for depression. The patient is not nervous/anxious.         Medications and Allergies:     Current Outpatient Medications   Medication Sig Dispense Refill   • atorvastatin (LIPITOR) 80 MG tablet Take 1 Tablet by mouth every day. 90 Tablet 3   • carvedilol (COREG) 12.5 MG Tab Take 1 Tablet by mouth 2 times a day with meals. 180 Tablet 3   • losartan-hydrochlorothiazide (HYZAAR) 100-25 MG per tablet Take 1 Tablet by mouth every day. 90 Tablet 3   • clopidogrel (PLAVIX) 75 MG Tab Take 1 Tablet by mouth every day. 90 Tablet 0     No current facility-administered medications for this visit.          Vitals:    /76 (BP Location: Right arm, Patient Position: Sitting, BP Cuff Size: Adult)   Pulse 92   Temp 36 °C (96.8 °F) (Temporal)   Ht 1.626 m (5' 4\")   Wt 87 kg (191 lb 12.8 oz)   SpO2 97%  Body mass index is 32.92 kg/m².    Physical Exam:     Physical Exam  Constitutional:       " General: He is not in acute distress.     Appearance: He is not diaphoretic.   HENT:      Head: Normocephalic and atraumatic.      Right Ear: External ear normal.      Left Ear: External ear normal.   Eyes:      General:         Right eye: No discharge.         Left eye: No discharge.      Conjunctiva/sclera: Conjunctivae normal.   Neck:      Thyroid: No thyromegaly.   Cardiovascular:      Rate and Rhythm: Normal rate and regular rhythm.      Heart sounds: Normal heart sounds. No murmur heard.      Pulmonary:      Effort: Pulmonary effort is normal. No respiratory distress.      Breath sounds: Normal breath sounds. No wheezing or rales.   Abdominal:      General: Bowel sounds are normal. There is no distension.      Palpations: Abdomen is soft.      Tenderness: There is no abdominal tenderness. There is no guarding.   Musculoskeletal:         General: No deformity. Normal range of motion.      Cervical back: Neck supple.   Skin:     General: Skin is warm.      Findings: No rash.   Neurological:      General: No focal deficit present.      Mental Status: He is alert. Mental status is at baseline.      Gait: Gait is intact.   Psychiatric:         Mood and Affect: Mood and affect normal.         Judgment: Judgment normal.          Assessment/Plan:    Problem List Items Addressed This Visit     Essential hypertension     Chronic health problem, stable, continue same medication regimen.         Relevant Medications    atorvastatin (LIPITOR) 80 MG tablet    carvedilol (COREG) 12.5 MG Tab    losartan-hydrochlorothiazide (HYZAAR) 100-25 MG per tablet    Other Relevant Orders    CBC WITHOUT DIFFERENTIAL    Comp Metabolic Panel    Mixed hyperlipidemia     Chronic health problem, recheck labs to assess control.  Continue Lipitor 80 mg daily.         Relevant Medications    atorvastatin (LIPITOR) 80 MG tablet    carvedilol (COREG) 12.5 MG Tab    losartan-hydrochlorothiazide (HYZAAR) 100-25 MG per tablet    Other Relevant Orders     Lipid Profile    TSH WITH REFLEX TO FT4    Obesity (BMI 30-39.9)     Counseled regarding eating healthy diet and do exercise 150 minutes in a week         JOSE (obstructive sleep apnea)     Chronic health problem, referral to pulmonology and sleep medicine         Relevant Orders    Referral to Pulmonary and Sleep Medicine    S/P primary angioplasty with coronary stent     Referral to cardiology.         Relevant Medications    carvedilol (COREG) 12.5 MG Tab    clopidogrel (PLAVIX) 75 MG Tab    Smoking greater than 30 pack years     Ordered CT lung cancer screening         Relevant Orders    CT-LUNG CANCER-SCREENING    ST elevation (STEMI) myocardial infarction involving other coronary artery of anterior wall (HCC)     Referral to cardiology to establish care.  Continue same medication regimen.         Relevant Medications    atorvastatin (LIPITOR) 80 MG tablet    carvedilol (COREG) 12.5 MG Tab    losartan-hydrochlorothiazide (HYZAAR) 100-25 MG per tablet    clopidogrel (PLAVIX) 75 MG Tab    Other Relevant Orders    REFERRAL TO CARDIOLOGY    Type 2 diabetes mellitus without complication, without long-term current use of insulin (HCC)     Chronic health problem, recheck A1c to assess control.  We will do foot exam at next visit.  Recommended to follow-up with ophthalmology for eye exam.         Relevant Orders    HEMOGLOBIN A1C    MICROALBUMIN CREAT RATIO URINE    TSH WITH REFLEX TO FT4      Other Visit Diagnoses     Screening for lung cancer        Relevant Orders    CT-LUNG CANCER-SCREENING    Need for hepatitis C screening test        Relevant Orders    HEP C VIRUS ANTIBODY    Screening for prostate cancer        Relevant Orders    PROSTATE SPECIFIC AG SCREENING           Please note that this dictation was created using voice recognition software. I have made every reasonable attempt to correct obvious errors, but I expect that there are errors of grammar and possibly content that I did not discover before  finalizing the note.    Follow up in 2 months for lab follow-up.

## 2022-01-31 NOTE — ASSESSMENT & PLAN NOTE
Chronic health problem, recheck A1c to assess control.  We will do foot exam at next visit.  Recommended to follow-up with ophthalmology for eye exam.

## 2022-02-09 NOTE — PROGRESS NOTES
Chief Complaint   Patient presents with   • MI (Anterior)     F/V Dx: ST elevation (STEMI) myocardial infarction involving other coronary artery of anterior wall (HCC)   • Hyperlipidemia     F/V Dx: S/P primary angioplasty with coronary stent   • Hypertension          Subjective:   Cheikh Chacko is a 61 y.o. male who presents today for follow-up.    Previous patient of Dr. Rondon and Dr Jesus at [facility].  Current medical problems include hypertension, diabetes, CAD with STEMI in 2020 s/p 2 ALESSANDRO (? Artery).  He had followed with [facility] for the last 2 years, due to insurance change he is now establishing with [facility].    Cheikh describes sharp nonradiating sternal pain occurring about 3-4 times a week. . It occurs with rest or activity such as mowing his lawn or lifting. When it happens he stops activity (if he is active) and rests and the pain resolves after about 10-15min. He can do activity without getting the pain. Sometimes he gets SOB. No diaphoresis, no syncope. The pain is noticeably different from his pain that he felt when he had a STEMI.     His blood pressure is poorly controlled. BPs at home up to 170/90s, usually over 130 systolic.     Works at [employer]. Minimal activity other than chores around the house.      Past Medical History:   Diagnosis Date   • Hyperlipidemia    • Hypertension    • Sleep apnea          Past Surgical History:   Procedure Laterality Date   • DENTAL SURGERY     • STENT PLACEMENT      CARDIAC         Family History   Problem Relation Age of Onset   • Diabetes Sister    • Diabetes Brother    • Cancer Neg Hx    • Heart Disease Neg Hx    • Stroke Neg Hx    • Alcohol/Drug Neg Hx          Social History     Tobacco Use   Smoking Status Former Smoker   • Packs/day: 1.00   • Years: 34.00   • Pack years: 34.00   • Types: Cigarettes   • Quit date: 2/10/2020   • Years since quittin.0   Smokeless Tobacco Never Used          Social History  "    Substance and Sexual Activity   Alcohol Use No         No Known Allergies      Outpatient Encounter Medications as of 2/10/2022   Medication Sig Dispense Refill   • carvedilol (COREG) 25 MG Tab Take 1 Tablet by mouth 2 times a day with meals. 180 Tablet 3   • aspirin (ASA) 81 MG Chew Tab chewable tablet Chew 81 mg every day.     • atorvastatin (LIPITOR) 80 MG tablet Take 1 Tablet by mouth every day. 90 Tablet 3   • losartan-hydrochlorothiazide (HYZAAR) 100-25 MG per tablet Take 1 Tablet by mouth every day. 90 Tablet 3   • [DISCONTINUED] carvedilol (COREG) 12.5 MG Tab Take 1 Tablet by mouth 2 times a day with meals. 180 Tablet 3   • [DISCONTINUED] clopidogrel (PLAVIX) 75 MG Tab Take 1 Tablet by mouth every day. 90 Tablet 0     No facility-administered encounter medications on file as of 2/10/2022.         Review of Systems   Constitutional: Negative for chills, fever and malaise/fatigue.   Respiratory: Negative for cough and shortness of breath.    Cardiovascular: Positive for chest pain. Negative for palpitations, orthopnea, leg swelling and PND.   Gastrointestinal: Negative for blood in stool and melena.   Musculoskeletal: Negative for falls.   Neurological: Negative for dizziness and loss of consciousness.          Objective:   /80 (BP Location: Left arm, Patient Position: Sitting, BP Cuff Size: Adult)   Pulse 84   Resp 18   Ht 1.626 m (5' 4\")   Wt 86.6 kg (191 lb)   SpO2 97%   BMI 32.79 kg/m²        Physical Exam  Vitals reviewed.   Constitutional:       Appearance: Normal appearance.   HENT:      Head: Normocephalic and atraumatic.   Eyes:      General: No scleral icterus.  Cardiovascular:      Rate and Rhythm: Normal rate and regular rhythm.      Heart sounds: No murmur heard.      Pulmonary:      Effort: Pulmonary effort is normal.      Breath sounds: No wheezing or rales.   Abdominal:      General: There is no distension.   Musculoskeletal:      Right lower leg: No edema.      Left lower leg: " No edema.   Skin:     General: Skin is warm and dry.   Neurological:      Mental Status: He is alert.      Gait: Gait normal.   Psychiatric:         Judgment: Judgment normal.            Assessment:     1. S/P primary angioplasty with coronary stent  carvedilol (COREG) 25 MG Tab   2. Mixed hyperlipidemia     3. Type 2 diabetes mellitus without complication, without long-term current use of insulin ([facility])     4. ST elevation (STEMI) myocardial infarction involving other coronary artery of anterior wall (HCC)  carvedilol (COREG) 25 MG Tab   5. Essential hypertension  carvedilol (COREG) 25 MG Tab        Medical Decision Making:  Today's Assessment / Plan:   Ischemic Heart Disease, Stable  - S/P jorge DESx2 (?artery) FEb 2020. Completed 1 yr of DAPT, now only plavix. No contraindication to aspirin so we agreed to switch to aspirin 81mg  - high intensity statin: Atorvastatin 80.  Has lipid profile ordered by PCP.  - beta blocker: Increase carvedilol to 25 mg twice daily    Hypertension, poorly controlled  -Could be contributing to his chest pain.  Increase carvedilol to 25 twice daily, based on his vitals today he has the heart rate room for this.  -Continue losartan-HCTZ    Diabetes mellitus type 2  -A1c 6.8 in June 2021.  Not currently on antidiabetic medications.  Would recommend initiating with a GLP-1 receptor antagonist and Metformin should his A1c be greater than 6.5.

## 2022-02-10 ENCOUNTER — OFFICE VISIT (OUTPATIENT)
Dept: CARDIOLOGY | Facility: MEDICAL CENTER | Age: 62
End: 2022-02-10
Attending: FAMILY MEDICINE
Payer: COMMERCIAL

## 2022-02-10 VITALS
DIASTOLIC BLOOD PRESSURE: 80 MMHG | HEIGHT: 64 IN | WEIGHT: 191 LBS | BODY MASS INDEX: 32.61 KG/M2 | HEART RATE: 84 BPM | SYSTOLIC BLOOD PRESSURE: 124 MMHG | OXYGEN SATURATION: 97 % | RESPIRATION RATE: 18 BRPM

## 2022-02-10 DIAGNOSIS — Z95.5 S/P PRIMARY ANGIOPLASTY WITH CORONARY STENT: ICD-10-CM

## 2022-02-10 DIAGNOSIS — I10 ESSENTIAL HYPERTENSION: ICD-10-CM

## 2022-02-10 DIAGNOSIS — E11.9 TYPE 2 DIABETES MELLITUS WITHOUT COMPLICATION, WITHOUT LONG-TERM CURRENT USE OF INSULIN (HCC): ICD-10-CM

## 2022-02-10 DIAGNOSIS — E78.2 MIXED HYPERLIPIDEMIA: ICD-10-CM

## 2022-02-10 DIAGNOSIS — I21.09 ST ELEVATION (STEMI) MYOCARDIAL INFARCTION INVOLVING OTHER CORONARY ARTERY OF ANTERIOR WALL (HCC): ICD-10-CM

## 2022-02-10 PROCEDURE — 99214 OFFICE O/P EST MOD 30 MIN: CPT | Performed by: PHYSICIAN ASSISTANT

## 2022-02-10 RX ORDER — ASPIRIN 81 MG/1
81 TABLET, CHEWABLE ORAL DAILY
COMMUNITY

## 2022-02-10 RX ORDER — CARVEDILOL 25 MG/1
25 TABLET ORAL 2 TIMES DAILY WITH MEALS
Qty: 180 TABLET | Refills: 3 | Status: SHIPPED | OUTPATIENT
Start: 2022-02-10 | End: 2023-04-24

## 2022-02-10 ASSESSMENT — ENCOUNTER SYMPTOMS
ORTHOPNEA: 0
LOSS OF CONSCIOUSNESS: 0
COUGH: 0
SHORTNESS OF BREATH: 0
CHILLS: 0
PALPITATIONS: 0
FEVER: 0
PND: 0
BLOOD IN STOOL: 0
DIZZINESS: 0
FALLS: 0

## 2022-02-10 NOTE — NON-PROVIDER
"      Note Author: Sarah Trammell, Student    Name Cheikh Chacko     1960   Age/Sex 61 y.o. male   MRN [id number]   Code Status Full Code       CC: \"Chest pain that comes and goes\"    HPI:   Cheikh Chacko is a 62 yo male with history of HTN, type 2 DM, HLD, and STEMI (2020) here for FU and med check.     He reports having 3-4 episodes per month of pinpoint chest pressure located left of sternum. He describes it as 4/10 \"pinching\" sensation that lasts for 10-15 minutes and improves with rest. Symptoms occur both when exerting himself (mowing the lawn) and at rest, and are accompanied by SOB that \"stops me in my tracks\". Symptoms are not similar to when he had STEMI. Denies diaphoresis, syncope, radiating pain, palpitations, chest heaviness.     Patient also reports increasing episodes of fatigue occurring several times a month where he \"doesn't want to do anything\". Denies recent weight changes, abdominal pain, melena, hematochezia.    HTN: Patient has home BP cuff and keeps record. States that his Bps are all over the place, with the highest being 170's/90's and lows going below 120/80. Denies ever feeling dizzy, presyncopal, or having syncope. He went to the dentist 3 weeks ago and was sent home for his high BP which improved after taking medication and he was able to return.     SH: Patient used to go on walks for exercise but stopped when it started getting cold out. Now he gets the majority of his activity by doing household tasks. 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 He eats mostly [place] foods: seafood, white rice, soup, meats, and a lot of veggies.     ROS:  *See HPI    Physical Exam       Vitals:    02/10/22 1007   BP: 124/80   BP Location: Left arm   Patient Position: Sitting   BP Cuff Size: Adult   Pulse: 84   Resp: 18   SpO2: 97%   Weight: 86.6 kg (191 lb)   Height: 1.626 m (5' 4\")     Body mass index is 32.79 kg/m². Weight: 86.6 kg (191 lb)  Oxygen Therapy:  Pulse " Oximetry: 97 %    Physical Exam  General: well-appearing, obese  Skin: warm and dry, no rashes, pallor, or discoloration   Neck: no JVD  Respiratory: lungs CTAB, no wheeze or rhonchi   Cardiovascular: RRR, no murmur, no carotid bruits on auscultation   GI: soft, non-tender, no hepatojugular reflex present   Extremities: no edema  Neuro: A&O x 3, no focal neurologic deficits   Psych: normal mood and affect    Assessment/Plan   1. Essential hypertension  -stable, poorly controlled at current dose of carvedilol   -Increase carvedilol dose to 25 mg 2x per day, patient ok to take 2 of current dose in the morning and at night until starting new prescription. Patient educated on potential SE including dizziness and other sx of hypotension.  -Continue HYZAAR at current dose   -Continue BP log and bring to next visit   -FU 3 months    2. STEMI MI involving other coronary artery of anterior wall (HCC)  -Add aspirin 81 mg daily  -D/C Plavix  -Continue atorvastatin 80 mg   -PCP has ordered lipid panel for 2 months from now, will discuss at 3 month FU  -FU 3 months    3. Stable angina  -Patient education provided regarding signs of MI including pain that does not resolve, crushing sensation in chest, pain radiating to jaw, etc. If this occurs go to ED immediately   -If current sx increase in frequency or severity please call office  -Patient to take 81 mg aspirin daily  -Carvedilol increased to 25 mg BID   -Consider stress test at next visit if symptoms persist/worsen     4. BMI 32.79  -Discussed lifestyle changes related to diet and exercise including daily walks, reducing carbs and increasing veggies

## 2022-02-11 ENCOUNTER — OFFICE VISIT (OUTPATIENT)
Dept: SLEEP MEDICINE | Facility: MEDICAL CENTER | Age: 62
End: 2022-02-11
Payer: COMMERCIAL

## 2022-02-11 VITALS
DIASTOLIC BLOOD PRESSURE: 84 MMHG | SYSTOLIC BLOOD PRESSURE: 138 MMHG | OXYGEN SATURATION: 96 % | WEIGHT: 190 LBS | BODY MASS INDEX: 33.66 KG/M2 | HEIGHT: 63 IN | HEART RATE: 84 BPM

## 2022-02-11 DIAGNOSIS — I10 ESSENTIAL HYPERTENSION: ICD-10-CM

## 2022-02-11 DIAGNOSIS — Z95.5 S/P PRIMARY ANGIOPLASTY WITH CORONARY STENT: ICD-10-CM

## 2022-02-11 DIAGNOSIS — G47.33 OSA (OBSTRUCTIVE SLEEP APNEA): ICD-10-CM

## 2022-02-11 PROCEDURE — 99213 OFFICE O/P EST LOW 20 MIN: CPT | Performed by: NURSE PRACTITIONER

## 2022-02-11 NOTE — PROGRESS NOTES
Chief Complaint   Patient presents with   • Follow-Up     JOSE-Last seen 05/08/2019   • Results     Sleep Study- 05/22/2019       HPI:      Mr. Chacko is a 62 y/o male patient who is in today for annual JOSE f/u, overdue. PMH includes HTN, obesity, JOSE, STEMI in 2/2020 status post angioplasty with coronary stent in 2/2020, former smoker 1 pack a day for 34 years quit on 2/10/2020, HLD.     Patient was on CPAP therapy for several months in 2019 however the machine was repossessed due to noncompliance.  Patient suffered a heart attack in February 2020 and underwent coronary artery stent placement.  He has followed up with [facility] as well as cardiology because his insurance changed after 2019 however he now has [facility] and needs to transfer his care back to [facility].  Patient is following up with cardiology Dr. Rondon and was seen yesterday.  Patient would like to resume treatment for sleep apnea and is here to review this today.  He goes to bed at 11 pm and wakes up at 4 am.  Patient works 3 to 4 12-hour shifts at [employer].  He denies morning headache, but reports snoring.  Patient tries to stay active by using his treadmill and stationary bike at home.      Sleep Study History:   PSG titration from 5/22/19 indicated the PAP titration was initiated with CPAP 5 cm of water and the pressure which was slowly titrated up in an attempt to eliminate sleep disordered breathing and snoring. The final pressure tested during the study was CPAP 10 cm water and at this final pressure the patient was observed in the supine REM sleep stage. The apnea hypopnea index improved to 5.5 per hour and O2 roxanne 90%. The average O2 stauration was 94%. He spent 5 min of sleep time below 89% O2 saturation.     PSG study from 2/21/19 indicated severe obstructive sleep apnea syndrome, AHI 46/h, associated with desaturations to a roxanne of 80%. AHI during rem was 17.5 and AHI while supine was 79.71. There was a mean oxygen  saturation of 94.0% with a minimum oxygen saturation of 77.0%.  Time spent with oxygen saturations below 89% was 21.7 minutes.    ROS:    Constitutional: Denies fevers, Denies weight changes  Eyes: Denies changes in vision, no eye pain  Ears/Nose/Throat/Mouth: Denies nasal congestion or sore throat   Cardiovascular: Denies chest pain or palpitations   Respiratory: Denies shortness of breath , Denies cough  Gastrointestinal/Hepatic: Denies abdominal pain, nausea, vomiting,   Skin/Breast: Denies rash,   Neurological: Denies headache, confusion,   Psychiatric: denies mood disorder   Sleep: + snoring       Past Medical History:   Diagnosis Date   • Hyperlipidemia    • Hypertension    • Sleep apnea        Past Surgical History:   Procedure Laterality Date   • DENTAL SURGERY     • STENT PLACEMENT      CARDIAC       Family History   Problem Relation Age of Onset   • Diabetes Sister    • Diabetes Brother    • Cancer Neg Hx    • Heart Disease Neg Hx    • Stroke Neg Hx    • Alcohol/Drug Neg Hx        Social History     Socioeconomic History   • Marital status:      Spouse name: Not on file   • Number of children: Not on file   • Years of education: Not on file   • Highest education level: Not on file   Occupational History   • Not on file   Tobacco Use   • Smoking status: Former Smoker     Packs/day: 1.00     Years: 34.00     Pack years: 34.00     Types: Cigarettes     Quit date: 2/10/2020     Years since quittin.0   • Smokeless tobacco: Never Used   Substance and Sexual Activity   • Alcohol use: No   • Drug use: No   • Sexual activity: Yes     Partners: Female     Comment: two children, and two step children    Other Topics Concern   • Not on file   Social History Narrative   • Not on file     Social Determinants of Health     Financial Resource Strain:    • Difficulty of Paying Living Expenses: Not on file   Food Insecurity:    • Worried About Running Out of Food in the Last Year: Not on file   • Ran Out of Food  in the Last Year: Not on file   Transportation Needs:    • Lack of Transportation (Medical): Not on file   • Lack of Transportation (Non-Medical): Not on file   Physical Activity:    • Days of Exercise per Week: Not on file   • Minutes of Exercise per Session: Not on file   Stress:    • Feeling of Stress : Not on file   Social Connections: Unknown   • Frequency of Communication with Friends and Family: Not on file   • Frequency of Social Gatherings with Friends and Family: Not on file   • Attends Evangelical Services: Not on file   • Active Member of Clubs or Organizations: Not on file   • Attends Club or Organization Meetings: Not on file   • Marital Status:    Intimate Partner Violence:    • Fear of Current or Ex-Partner: Not on file   • Emotionally Abused: Not on file   • Physically Abused: Not on file   • Sexually Abused: Not on file   Housing Stability:    • Unable to Pay for Housing in the Last Year: Not on file   • Number of Places Lived in the Last Year: Not on file   • Unstable Housing in the Last Year: Not on file       Allergies as of 02/11/2022   • (No Known Allergies)        Vitals:  Vitals:    02/11/22 0813   BP: 138/84   Pulse: 84   SpO2: 96%       Current medications as of today   Current Outpatient Medications   Medication Sig Dispense Refill   • carvedilol (COREG) 25 MG Tab Take 1 Tablet by mouth 2 times a day with meals. 180 Tablet 3   • aspirin (ASA) 81 MG Chew Tab chewable tablet Chew 81 mg every day.     • atorvastatin (LIPITOR) 80 MG tablet Take 1 Tablet by mouth every day. 90 Tablet 3   • losartan-hydrochlorothiazide (HYZAAR) 100-25 MG per tablet Take 1 Tablet by mouth every day. 90 Tablet 3     No current facility-administered medications for this visit.         Physical Exam: Limited by COVID-19 precautions.  Appearance: Well developed, well nourished, no acute distress  Eyes: PERRL, EOM intact, sclera white, conjunctiva moist  Ears: no lesions or deformities  Hearing: grossly  intact  Nose: no lesions or deformities  Respiratory effort: no intercostal retractions or use of accessory muscles  Extremities: no cyanosis or edema  Abdomen: soft, large  Gait and Station: normal  Digits and nails: no clubbing, cyanosis, petechiae or nodes.  Cranial nerves: grossly intact  Skin: no visible rashes, lesions or ulcers noted  Orientation: Oriented to time, person and place  Mood and affect: mood and affect appropriate, normal interaction with examiner  Judgement: Intact    Assessment:  1. JOSE (obstructive sleep apnea)  Polysomnography, 4 or More   2. Essential hypertension     3. S/P primary angioplasty with coronary stent     4. BMI 32.0-32.9,adult  Patient identified as having weight management issue.  Appropriate orders and counseling given.         Plan  Discussed the cardiovascular and neuropsychiatric risks of untreated JOSE; including but not limited to: HTN, DM, MI, ASCVD, CVA, CHF, traffic accidents.     1.  Prior sleep study and titration were reviewed today however these are outdated and also due to repossession of CPAP due to noncompliance the patient will need to repeat testing.    *Inspire implant therapy was also reviewed with the patient and further educational information was provided for review if needed.    *Patient is amenable to complete in lab study.  Order PSG split night study to reassess JOSE.  Patient does not require Ambien.  Will message patient with results when available initiate CPAP versus BiPAP therapy as well as supplemental oxygen if indicated.  Patient will utilize preferred home care.  Advised patient to use the CPAP every night for more than four hours for optimal health benefit and to meet the health insurance 70% compliance guideline. Advised patient he may change the mask out if needed within the first 30 days after he receives his equipment.     *[employer] recall was reviewed with the patient.    2-3. Continue to f/u with cardiology, Dr. Rondon.  4.   Encouraged a healthy diet and exercise to help with weight loss and management.  If your BMI is 25-29.9 you are overweight. If your BMI is 30 or greater you are obese. To lose weight eat less, move more, or both. Any diet that reduces caloric intake can help with weight loss. Extra weight may reduce your lifespan. Avoid dramatic unsustainable dietary changes that result in the yo-yo effect (down then back up.)  Usually small modifications in diet and exercise are easier to stick with.  5. Sleep hygiene discussed. Recommend keeping a set sleep/wake schedule. Logging enough hours of sleep. Limiting/Avoiding naps. No caffeine after noon and no heavy meals in the evening.  Advised patient to try and get at least 7 to 8 hours of sleep.  6. Follow up with appropriate healthcare providers for all other medical problems.  7. F/u in 4 months for first compliance, JOSE.       BHAVANA Chapa.      This dictation was created using voice recognition software. The accuracy of the dictation is limited to the abilities of the software. I expect there may be some errors of grammar and possibly content.

## 2022-02-11 NOTE — PATIENT INSTRUCTIONS
Sleep Apnea  Sleep apnea affects breathing during sleep. It causes breathing to stop for a short time or to become shallow. It can also increase the risk of:  · Heart attack.  · Stroke.  · Being very overweight (obese).  · Diabetes.  · Heart failure.  · Irregular heartbeat.  The goal of treatment is to help you breathe normally again.  What are the causes?  There are three kinds of sleep apnea:  · Obstructive sleep apnea. This is caused by a blocked or collapsed airway.  · Central sleep apnea. This happens when the brain does not send the right signals to the muscles that control breathing.  · Mixed sleep apnea. This is a combination of obstructive and central sleep apnea.  The most common cause of this condition is a collapsed or blocked airway. This can happen if:  · Your throat muscles are too relaxed.  · Your tongue and tonsils are too large.  · You are overweight.  · Your airway is too small.  What increases the risk?  · Being overweight.  · Smoking.  · Having a small airway.  · Being older.  · Being male.  · Drinking alcohol.  · Taking medicines to calm yourself (sedatives or tranquilizers).  · Having family members with the condition.  What are the signs or symptoms?  · Trouble staying asleep.  · Being sleepy or tired during the day.  · Getting angry a lot.  · Loud snoring.  · Headaches in the morning.  · Not being able to focus your mind (concentrate).  · Forgetting things.  · Less interest in sex.  · Mood swings.  · Personality changes.  · Feelings of sadness (depression).  · Waking up a lot during the night to pee (urinate).  · Dry mouth.  · Sore throat.  How is this diagnosed?  · Your medical history.  · A physical exam.  · A test that is done when you are sleeping (sleep study). The test is most often done in a sleep lab but may also be done at home.  How is this treated?    · Sleeping on your side.  · Using a medicine to get rid of mucus in your nose (decongestant).  · Avoiding the use of alcohol,  medicines to help you relax, or certain pain medicines (narcotics).  · Losing weight, if needed.  · Changing your diet.  · Not smoking.  · Using a machine to open your airway while you sleep, such as:  ? An oral appliance. This is a mouthpiece that shifts your lower jaw forward.  ? A CPAP device. This device blows air through a mask when you breathe out (exhale).  ? An EPAP device. This has valves that you put in each nostril.  ? A BPAP device. This device blows air through a mask when you breathe in (inhale) and breathe out.  · Having surgery if other treatments do not work.  It is important to get treatment for sleep apnea. Without treatment, it can lead to:  · High blood pressure.  · Coronary artery disease.  · In men, not being able to have an erection (impotence).  · Reduced thinking ability.  Follow these instructions at home:  Lifestyle  · Make changes that your doctor recommends.  · Eat a healthy diet.  · Lose weight if needed.  · Avoid alcohol, medicines to help you relax, and some pain medicines.  · Do not use any products that contain nicotine or tobacco, such as cigarettes, e-cigarettes, and chewing tobacco. If you need help quitting, ask your doctor.  General instructions  · Take over-the-counter and prescription medicines only as told by your doctor.  · If you were given a machine to use while you sleep, use it only as told by your doctor.  · If you are having surgery, make sure to tell your doctor you have sleep apnea. You may need to bring your device with you.  · Keep all follow-up visits as told by your doctor. This is important.  Contact a doctor if:  · The machine that you were given to use during sleep bothers you or does not seem to be working.  · You do not get better.  · You get worse.  Get help right away if:  · Your chest hurts.  · You have trouble breathing in enough air.  · You have an uncomfortable feeling in your back, arms, or stomach.  · You have trouble talking.  · One side of your  body feels weak.  · A part of your face is hanging down.  These symptoms may be an emergency. Do not wait to see if the symptoms will go away. Get medical help right away. Call your local emergency services (911 in the [place] Do not drive yourself to the hospital.  Summary  · This condition affects breathing during sleep.  · The most common cause is a collapsed or blocked airway.  · The goal of treatment is to help you breathe normally while you sleep.  This information is not intended to replace advice given to you by your health care provider. Make sure you discuss any questions you have with your health care provider.  Document Released: 09/26/2009 Document Revised: 10/04/2019 Document Reviewed: 08/13/2019  [employer] Patient Education © 2020 [employer]    Obesity, Adult  Obesity is having too much body fat. Being obese means that your weight is more than what is healthy for you.  BMI is a number that explains how much body fat you have. If you have a BMI of 30 or more, you are obese. Obesity is often caused by eating or drinking more calories than your body uses. Changing your lifestyle can help you lose weight.  Obesity can cause serious health problems, such as:  · Stroke.  · Coronary artery disease (CAD).  · Type 2 diabetes.  · Some types of cancer, including cancers of the colon, breast, uterus, and gallbladder.  · Osteoarthritis.  · High blood pressure (hypertension).  · High cholesterol.  · Sleep apnea.  · Gallbladder stones.  · Infertility problems.  What are the causes?  · Eating meals each day that are high in calories, sugar, and fat.  · Being born with genes that may make you more likely to become obese.  · Having a medical condition that causes obesity.  · Taking certain medicines.  · Sitting a lot (having a sedentary lifestyle).  · Not getting enough sleep.  · Drinking a lot of drinks that have sugar in them.  What increases the risk?  · Having a family history of obesity.  · Being an   American woman.  · Being a  man.  · Living in an area with limited access to:  ? [place], recreation centers, or sidewalks.  ? Healthy food choices, such as grocery stores and kooaba markets.  What are the signs or symptoms?  The main sign is having too much body fat.  How is this treated?  · Treatment for this condition often includes changing your lifestyle. Treatment may include:  ? Changing your diet. This may include making a healthy meal plan.  ? Exercise. This may include activity that causes your heart to beat faster (aerobic exercise) and strength training. Work with your doctor to design a program that works for you.  ? Medicine to help you lose weight. This may be used if you are not able to lose 1 pound a week after 6 weeks of healthy eating and more exercise.  ? Treating conditions that cause the obesity.  ? Surgery. Options may include gastric banding and gastric bypass. This may be done if:  § Other treatments have not helped to improve your condition.  § You have a BMI of 40 or higher.  § You have life-threatening health problems related to obesity.  Follow these instructions at home:  Eating and drinking    · Follow advice from your doctor about what to eat and drink. Your doctor may tell you to:  ? Limit fast food, sweets, and processed snack foods.  ? Choose low-fat options. For example, choose low-fat milk instead of whole milk.  ? Eat 5 or more servings of fruits or vegetables each day.  ? Eat at home more often. This gives you more control over what you eat.  ? Choose healthy foods when you eat out.  ? Learn to read food labels. This will help you learn how much food is in 1 serving.  ? Keep low-fat snacks available.  ? Avoid drinks that have a lot of sugar in them. These include soda, fruit juice, iced tea with sugar, and flavored milk.  · Drink enough water to keep your pee (urine) pale yellow.  · Do not go on fad diets.  Physical activity  · Exercise often, as told by your doctor.  Most adults should get up to 150 minutes of moderate-intensity exercise every week.Ask your doctor:  ? What types of exercise are safe for you.  ? How often you should exercise.  · Warm up and stretch before being active.  · Do slow stretching after being active (cool down).  · Rest between times of being active.  Lifestyle  · Work with your doctor and a food expert (dietitian) to set a weight-loss goal that is best for you.  · Limit your screen time.  · Find ways to reward yourself that do not involve food.  · Do not drink alcohol if:  ? Your doctor tells you not to drink.  ? You are pregnant, may be pregnant, or are planning to become pregnant.  · If you drink alcohol:  ? Limit how much you use to:  § 0-1 drink a day for women.  § 0-2 drinks a day for men.  ? Be aware of how much alcohol is in your drink. In the [place] one drink equals one 12 oz bottle of beer (355 mL), one 5 oz glass of wine (148 mL), or one 1½ oz glass of hard liquor (44 mL).  General instructions  · Keep a weight-loss journal. This can help you keep track of:  ? The food that you eat.  ? How much exercise you get.  · Take over-the-counter and prescription medicines only as told by your doctor.  · Take vitamins and supplements only as told by your doctor.  · Think about joining a support group.  · Keep all follow-up visits as told by your doctor. This is important.  Contact a doctor if:  · You cannot meet your weight loss goal after you have changed your diet and lifestyle for 6 weeks.  Get help right away if you:  · Are having trouble breathing.  · Are having thoughts of harming yourself.  Summary  · Obesity is having too much body fat.  · Being obese means that your weight is more than what is healthy for you.  · Work with your doctor to set a weight-loss goal.  · Get regular exercise as told by your doctor.  This information is not intended to replace advice given to you by your health care provider. Make sure you discuss any questions you  have with your health care provider.  Document Released: 03/11/2013 Document Revised: 08/22/2019 Document Reviewed: 08/22/2019  [employer] Patient Education © 2020 [employer]        Inspire is the only FDA approved implant, in 2014, obstructive sleep apnea treatment that works inside your body to treat the root cause of sleep apnea with just a click of a button.  It works inside your body while you sleep.  It is a small device that is placed under the skin of the neck and chest during a short outpatient procedure  After healing it is turned on.  When you are ready for bed you simply click the remote to turn the Inspire on.  While you sleep Inspire opens your airway allowing you to breathe normally and sleep peacefully. It works by moving the tongue to keep the air way open while you are sleeping. Most [place] insurance covers inspire.    To be a candidate you have to have moderate to severe obstructive sleep apnea with an AHI between 15 and 65, are unable to use or get consistent benefit from CPAP, are not significantly obese a BMI less than 35, the sleep study is less than 2 years old, and over the age of 22. It does not work with central sleep apnea.     Prior to the Inspire implant procedure the patient will undergo a drug-induced endoscopy which will determine if airway collapse is on 4 or 2 sides of the airway.  If the airway collapses on 4 sides the patient is not a candidate for the implant; however if the airway collapses in the front and the back only the patient is a candidate for Inspire implant.   Patients with anterior-posterior predominant retropalatal collapse on drug-induced sleep endoscopy are candidates for the device.  The patient's who have complete concentric collapse at the retropalatal airway are excluded as protrusion of the time will not resolve this pattern of airway obstruction.    Inspire is safe, clinically proven, and FDA approved.  90% of that bed partners report no snoring or soft  snoring, there is a 79% reduction in sleep apnea events, 94% of people are satisfied with inspire, 96% of the Inspire patients say Inspire is better than CPAP and would recommend Inspire to others.    You can learn more at [employer].

## 2022-02-24 ENCOUNTER — PATIENT MESSAGE (OUTPATIENT)
Dept: SLEEP MEDICINE | Facility: MEDICAL CENTER | Age: 62
End: 2022-02-24
Payer: COMMERCIAL

## 2022-02-24 DIAGNOSIS — G47.33 OSA (OBSTRUCTIVE SLEEP APNEA): ICD-10-CM

## 2022-02-25 NOTE — TELEPHONE ENCOUNTER
From: Cheikh Chacko  To: Nurse Practitioner Lelo Rodriguez  Sent: 2/24/2022 11:02 AM PST  Subject: sleep test    aiden rees during our conversation you said you well do another sleep testing on me .what i got from your assistant is business card ([facility] ) i called they said I'm not on file ..call your doctor back

## 2022-02-25 NOTE — PATIENT COMMUNICATION
"SS denied.    [facility] denied inlab didn’t meet their criteria per website call call for a p2p [phone] #3    Reason for testing:   \"Prior sleep study and titration were reviewed today however these are outdated and also due to repossession of CPAP due to noncompliance the patient will need to repeat testing\"  "

## 2022-02-25 NOTE — PROGRESS NOTES
Order for autoCPAP 10-15 cmH2O with small Airfit F20 mask was placed today. Please fax to [facility]. Patient has f/u set up June.     Thank you  BHAVANA Chapa.

## 2022-03-21 ENCOUNTER — HOSPITAL ENCOUNTER (OUTPATIENT)
Dept: LAB | Facility: MEDICAL CENTER | Age: 62
End: 2022-03-21
Attending: FAMILY MEDICINE
Payer: COMMERCIAL

## 2022-03-21 DIAGNOSIS — Z12.5 SCREENING FOR PROSTATE CANCER: ICD-10-CM

## 2022-03-21 DIAGNOSIS — E11.9 TYPE 2 DIABETES MELLITUS WITHOUT COMPLICATION, WITHOUT LONG-TERM CURRENT USE OF INSULIN (HCC): ICD-10-CM

## 2022-03-21 DIAGNOSIS — I10 ESSENTIAL HYPERTENSION: ICD-10-CM

## 2022-03-21 DIAGNOSIS — E78.2 MIXED HYPERLIPIDEMIA: ICD-10-CM

## 2022-03-21 DIAGNOSIS — Z11.59 NEED FOR HEPATITIS C SCREENING TEST: ICD-10-CM

## 2022-03-21 LAB
ALBUMIN SERPL BCP-MCNC: 4.5 G/DL (ref 3.2–4.9)
ALBUMIN/GLOB SERPL: 1.9 G/DL
ALP SERPL-CCNC: 49 U/L (ref 30–99)
ALT SERPL-CCNC: 35 U/L (ref 2–50)
ANION GAP SERPL CALC-SCNC: 11 MMOL/L (ref 7–16)
AST SERPL-CCNC: 30 U/L (ref 12–45)
BILIRUB SERPL-MCNC: 0.3 MG/DL (ref 0.1–1.5)
BUN SERPL-MCNC: 22 MG/DL (ref 8–22)
CALCIUM SERPL-MCNC: 9.4 MG/DL (ref 8.4–10.2)
CHLORIDE SERPL-SCNC: 109 MMOL/L (ref 96–112)
CHOLEST SERPL-MCNC: 129 MG/DL (ref 100–199)
CO2 SERPL-SCNC: 20 MMOL/L (ref 20–33)
CREAT SERPL-MCNC: 0.82 MG/DL (ref 0.5–1.4)
CREAT UR-MCNC: 97.19 MG/DL
ERYTHROCYTE [DISTWIDTH] IN BLOOD BY AUTOMATED COUNT: 45 FL (ref 35.9–50)
EST. AVERAGE GLUCOSE BLD GHB EST-MCNC: 151 MG/DL
FASTING STATUS PATIENT QL REPORTED: NORMAL
GFR SERPLBLD CREATININE-BSD FMLA CKD-EPI: 98 ML/MIN/1.73 M 2
GLOBULIN SER CALC-MCNC: 2.4 G/DL (ref 1.9–3.5)
GLUCOSE SERPL-MCNC: 123 MG/DL (ref 65–99)
HBA1C MFR BLD: 6.9 % (ref 4–5.6)
HCT VFR BLD AUTO: 46 % (ref 42–52)
HCV AB SER QL: NORMAL
HDLC SERPL-MCNC: 49 MG/DL
HGB BLD-MCNC: 15.6 G/DL (ref 14–18)
LDLC SERPL CALC-MCNC: 58 MG/DL
MCH RBC QN AUTO: 32 PG (ref 27–33)
MCHC RBC AUTO-ENTMCNC: 33.9 G/DL (ref 33.7–35.3)
MCV RBC AUTO: 94.3 FL (ref 81.4–97.8)
MICROALBUMIN UR-MCNC: <1.2 MG/DL
MICROALBUMIN/CREAT UR: NORMAL MG/G (ref 0–30)
PLATELET # BLD AUTO: 203 K/UL (ref 164–446)
PMV BLD AUTO: 9.4 FL (ref 9–12.9)
POTASSIUM SERPL-SCNC: 4.4 MMOL/L (ref 3.6–5.5)
PROT SERPL-MCNC: 6.9 G/DL (ref 6–8.2)
PSA SERPL-MCNC: 1.65 NG/ML (ref 0–4)
RBC # BLD AUTO: 4.88 M/UL (ref 4.7–6.1)
SODIUM SERPL-SCNC: 140 MMOL/L (ref 135–145)
TRIGL SERPL-MCNC: 109 MG/DL (ref 0–149)
TSH SERPL DL<=0.005 MIU/L-ACNC: 2.71 UIU/ML (ref 0.38–5.33)
WBC # BLD AUTO: 6.5 K/UL (ref 4.8–10.8)

## 2022-03-21 PROCEDURE — 80061 LIPID PANEL: CPT

## 2022-03-21 PROCEDURE — 82570 ASSAY OF URINE CREATININE: CPT

## 2022-03-21 PROCEDURE — 84443 ASSAY THYROID STIM HORMONE: CPT

## 2022-03-21 PROCEDURE — 85027 COMPLETE CBC AUTOMATED: CPT

## 2022-03-21 PROCEDURE — 84153 ASSAY OF PSA TOTAL: CPT

## 2022-03-21 PROCEDURE — 80053 COMPREHEN METABOLIC PANEL: CPT

## 2022-03-21 PROCEDURE — 86803 HEPATITIS C AB TEST: CPT

## 2022-03-21 PROCEDURE — 36415 COLL VENOUS BLD VENIPUNCTURE: CPT

## 2022-03-21 PROCEDURE — 82043 UR ALBUMIN QUANTITATIVE: CPT

## 2022-03-21 PROCEDURE — 83036 HEMOGLOBIN GLYCOSYLATED A1C: CPT

## 2022-03-29 ENCOUNTER — APPOINTMENT (OUTPATIENT)
Dept: MEDICAL GROUP | Facility: MEDICAL CENTER | Age: 62
End: 2022-03-29
Payer: COMMERCIAL

## 2022-04-04 ENCOUNTER — OFFICE VISIT (OUTPATIENT)
Dept: MEDICAL GROUP | Facility: MEDICAL CENTER | Age: 62
End: 2022-04-04
Payer: COMMERCIAL

## 2022-04-04 VITALS
RESPIRATION RATE: 17 BRPM | WEIGHT: 191.8 LBS | DIASTOLIC BLOOD PRESSURE: 70 MMHG | SYSTOLIC BLOOD PRESSURE: 132 MMHG | OXYGEN SATURATION: 95 % | TEMPERATURE: 96.6 F | HEIGHT: 64 IN | HEART RATE: 78 BPM | BODY MASS INDEX: 32.74 KG/M2

## 2022-04-04 DIAGNOSIS — Z13.79 GENETIC SCREENING: ICD-10-CM

## 2022-04-04 DIAGNOSIS — E11.9 TYPE 2 DIABETES MELLITUS WITHOUT COMPLICATION, WITHOUT LONG-TERM CURRENT USE OF INSULIN (HCC): ICD-10-CM

## 2022-04-04 DIAGNOSIS — I10 ESSENTIAL HYPERTENSION: ICD-10-CM

## 2022-04-04 DIAGNOSIS — L60.0 INGROWN NAIL: ICD-10-CM

## 2022-04-04 DIAGNOSIS — E78.2 MIXED HYPERLIPIDEMIA: ICD-10-CM

## 2022-04-04 PROCEDURE — 99214 OFFICE O/P EST MOD 30 MIN: CPT | Performed by: FAMILY MEDICINE

## 2022-04-04 RX ORDER — METFORMIN HYDROCHLORIDE 500 MG/1
TABLET, EXTENDED RELEASE ORAL
Qty: 180 TABLET | Refills: 3 | Status: SHIPPED | OUTPATIENT
Start: 2022-04-04 | End: 2022-05-16

## 2022-04-04 ASSESSMENT — ENCOUNTER SYMPTOMS
CHILLS: 0
PALPITATIONS: 0
FEVER: 0

## 2022-04-04 ASSESSMENT — FIBROSIS 4 INDEX: FIB4 SCORE: 1.57

## 2022-04-04 NOTE — PROGRESS NOTES
FAMILY MEDICINE VISIT                                                               Chief complaint::Diagnoses of Type 2 diabetes mellitus without complication, without long-term current use of insulin (HCC), Mixed hyperlipidemia, Essential hypertension, Ingrown nail, and Genetic screening were pertinent to this visit.    History of present illness: Cheikh Chacko is a 61 y.o. male who presented for lab follow-up.    Problem   Type 2 Diabetes Mellitus Without Complication, Without Long-Term Current Use of Insulin (Hcc)    His recent A1c got elevated to 6.9 from 6.8.  He has history of MI.  Currently not on any medication.  Previously took Metformin and reports stomach upset with regular Metformin.    Lab Results   Component Value Date/Time    HBA1C 6.9 (H) 03/21/2022 09:30 AM        Monofilament testing with a 10 gram force: sensation intact: intact bilaterally  Visual Inspection: Feet without maceration, ulcers, fissures.  Ingrown nail of right big toe  Pedal pulses: intact bilaterally     Mixed Hyperlipidemia    He is currently on Lipitor 80 mg daily.  No side effects with medication.    Lab Results   Component Value Date/Time    CHOLSTRLTOT 129 03/21/2022 0930    TRIGLYCERIDE 109 03/21/2022 0930    HDL 49 03/21/2022 0930    LDL 58 03/21/2022 0930        Essential Hypertension    He is on Coreg 25 mg twice daily and hyzaar 1 tablet daily.  His Coreg medication dose was increased by cardiology.  No side effects with this medication.  Blood pressure today 132/70.  No chest pain, palpitations, shortness of breath, lower extremity swelling.         Review of systems:     Review of Systems   Constitutional: Negative for chills, fever and malaise/fatigue.   Cardiovascular: Negative for chest pain, palpitations and leg swelling.        Medications and Allergies:     Current Outpatient Medications   Medication Sig Dispense Refill   • metFORMIN ER (GLUCOPHAGE XR) 500 MG TABLET SR 24 HR Take 1 tablet by mouth daily for  "1 week, then take 1 tablet by mouth twice daily for 1 week, then take 2 tablet by mouth in the morning and 1 tablet by mouth at night for 1 week, then take 2 tablet by mouth twice daily. 180 Tablet 3   • carvedilol (COREG) 25 MG Tab Take 1 Tablet by mouth 2 times a day with meals. 180 Tablet 3   • aspirin (ASA) 81 MG Chew Tab chewable tablet Chew 81 mg every day.     • atorvastatin (LIPITOR) 80 MG tablet Take 1 Tablet by mouth every day. 90 Tablet 3   • losartan-hydrochlorothiazide (HYZAAR) 100-25 MG per tablet Take 1 Tablet by mouth every day. 90 Tablet 3     No current facility-administered medications for this visit.          Vitals:    /70 (BP Location: Left arm, Patient Position: Sitting, BP Cuff Size: Adult)   Pulse 78   Temp 35.9 °C (96.6 °F)   Resp 17   Ht 1.626 m (5' 4\")   Wt 87 kg (191 lb 12.8 oz)   SpO2 95%  Body mass index is 32.92 kg/m².    Physical Exam:     Physical Exam  Constitutional:       General: He is not in acute distress.  HENT:      Head: Normocephalic and atraumatic.   Eyes:      Conjunctiva/sclera: Conjunctivae normal.   Cardiovascular:      Rate and Rhythm: Normal rate.   Pulmonary:      Effort: Pulmonary effort is normal. No respiratory distress.   Musculoskeletal:         General: No deformity.      Cervical back: Neck supple.   Skin:     Findings: No rash.   Neurological:      Mental Status: He is alert.      Gait: Gait is intact.   Psychiatric:         Mood and Affect: Mood and affect normal.         Judgment: Judgment normal.          Labs:  I reviewed with patient recent labs resulted on 3/21/2022.    Assessment/Plan:    Problem List Items Addressed This Visit     Essential hypertension     Chronic health problem, stable, continue same medication regimen.         Mixed hyperlipidemia     Chronic health problem, stable, continue same medication regimen.         Type 2 diabetes mellitus without complication, without long-term current use of insulin (HCC)     Chronic health " problem, A1c has been getting up, start Metformin to reach maximum dose of 1000 mg twice daily.  Prescribed extended release as he previously had stomach upset with regular Metformin.  Repeat A1c in 3-month, if A1c is not getting better then will consider starting on GLP-1 agonist due to his cardiac history.  Counseled regarding eating healthy diet and exercise.  Referral to podiatry for ingrown nail of right big toe.         Relevant Medications    metFORMIN ER (GLUCOPHAGE XR) 500 MG TABLET SR 24 HR    Other Relevant Orders    Diabetic Monofilament Lower Extremity Exam (Completed)    Comp Metabolic Panel    HEMOGLOBIN A1C    POCT Retinal Eye Exam      Other Visit Diagnoses     Ingrown nail        Relevant Orders    Referral to Podiatry    Genetic screening      Discussed with patient about [facility] project, referral placed.    Relevant Orders    Referral to Genetic Research Studies           Please note that this dictation was created using voice recognition software. I have made every reasonable attempt to correct obvious errors, but I expect that there are errors of grammar and possibly content that I did not discover before finalizing the note.    Follow up in 3 months for lab follow-up.

## 2022-04-04 NOTE — ASSESSMENT & PLAN NOTE
Chronic health problem, A1c has been getting up, start Metformin to reach maximum dose of 1000 mg twice daily.  Prescribed extended release as he previously had stomach upset with regular Metformin.  Repeat A1c in 3-month, if A1c is not getting better then will consider starting on GLP-1 agonist due to his cardiac history.  Counseled regarding eating healthy diet and exercise.  Referral to podiatry for ingrown nail of right big toe.

## 2022-04-13 ENCOUNTER — APPOINTMENT (OUTPATIENT)
Dept: MEDICAL GROUP | Facility: PHYSICIAN GROUP | Age: 62
End: 2022-04-13
Payer: COMMERCIAL

## 2022-04-26 NOTE — PATIENT COMMUNICATION
Faxed OV, Orders, ID/Insurance to DME:  [facility] / ph [phone] / fax [phone].  Fax Confirmed   EF[facility]

## 2022-05-10 ASSESSMENT — ENCOUNTER SYMPTOMS
ORTHOPNEA: 0
COUGH: 0
CHILLS: 0
PALPITATIONS: 0
DIZZINESS: 0
BLOOD IN STOOL: 0
FEVER: 0
PND: 0

## 2022-05-10 NOTE — PROGRESS NOTES
Chief Complaint   Patient presents with   • Hypertension   • MI (Non ST Segment Elevation MI)     F/V Dx: ST elevation (STEMI) myocardial infarction involving other coronary artery of anterior wall (HCC)   • Hyperlipidemia          Subjective:   Cheikh Chacko is a 61 y.o. male who presents today for follow-up.    Previous patient of Dr. Rondon and Dr Jesus at [facility].  Current medical problems include hypertension, diabetes, CAD with STEMI in Feb 2020 s/p 2 ALESSANDRO (? Artery).  He had followed with [facility] for the last 2 years, due to insurance change he is now establishing with [facility] cardiology.    At last visit Cheikh described chest pain occurring with some activities. His blood pressure was poorly controlled. I increased the carvedilol to 25mg bid.     Cheikh has not been bothered by the sharp chest pain for several months now.  He gets short of breath when he is exerting himself such as heavy lifting or mowing the lawn but otherwise he does not have any pain or pressure in his chest.  He specifically says he has no symptoms like he had prior to his heart attack in 2020.    He does not check his blood pressures regularly but recalls they have been better controlled, sometimes he still gets high readings.  He has no difficulties with the carvedilol at the 25 mg dose.    Works at [employer]. Minimal activity other than chores around the house.      Past Medical History:   Diagnosis Date   • Hyperlipidemia    • Hypertension    • Sleep apnea          Past Surgical History:   Procedure Laterality Date   • DENTAL SURGERY     • STENT PLACEMENT      CARDIAC         Family History   Problem Relation Age of Onset   • Diabetes Sister    • Diabetes Brother    • Cancer Neg Hx    • Heart Disease Neg Hx    • Stroke Neg Hx    • Alcohol/Drug Neg Hx          Social History     Tobacco Use   Smoking Status Former Smoker   • Packs/day: 1.00   • Years: 34.00   • Pack years: 34.00   • Types: Cigarettes   • Quit  "date: 2/10/2020   • Years since quittin.2   Smokeless Tobacco Never Used          Social History     Substance and Sexual Activity   Alcohol Use No         No Known Allergies      Outpatient Encounter Medications as of 2022   Medication Sig Dispense Refill   • metFORMIN ER (GLUCOPHAGE XR) 500 MG TABLET SR 24 HR Take 1 tablet by mouth daily for 1 week, then take 1 tablet by mouth twice daily for 1 week, then take 2 tablet by mouth in the morning and 1 tablet by mouth at night for 1 week, then take 2 tablet by mouth twice daily. 180 Tablet 3   • carvedilol (COREG) 25 MG Tab Take 1 Tablet by mouth 2 times a day with meals. 180 Tablet 3   • aspirin (ASA) 81 MG Chew Tab chewable tablet Chew 81 mg every day.     • atorvastatin (LIPITOR) 80 MG tablet Take 1 Tablet by mouth every day. 90 Tablet 3   • losartan-hydrochlorothiazide (HYZAAR) 100-25 MG per tablet Take 1 Tablet by mouth every day. 90 Tablet 3     No facility-administered encounter medications on file as of 2022.         Review of Systems   Constitutional: Negative for chills and fever.        No unexpected weight changes   Respiratory: Positive for shortness of breath. Negative for cough.    Cardiovascular: Negative for chest pain, palpitations, orthopnea, leg swelling and PND.   Gastrointestinal: Negative for blood in stool, melena and nausea.   Neurological: Negative for dizziness.   Psychiatric/Behavioral: Negative for substance abuse.          Objective:   /78 (BP Location: Left arm, Patient Position: Sitting, BP Cuff Size: Adult)   Pulse 77   Resp 16   Ht 1.626 m (5' 4\")   Wt 87.5 kg (193 lb)   SpO2 98%   BMI 33.13 kg/m²        Physical Exam  Vitals reviewed.   Constitutional:       General: He is not in acute distress.  HENT:      Head: Normocephalic and atraumatic.   Eyes:      General: No scleral icterus.  Neck:      Vascular: No carotid bruit.   Cardiovascular:      Rate and Rhythm: Normal rate and regular rhythm.      Heart " sounds: No murmur heard.     Comments: Radial pulses 2+ bilaterally  PT pulses 2+ bilaterally    Pulmonary:      Effort: Pulmonary effort is normal. No respiratory distress.      Breath sounds: No rales.   Abdominal:      General: There is no distension.   Musculoskeletal:      Right lower leg: No edema.      Left lower leg: No edema.   Skin:     General: Skin is warm and dry.   Neurological:      General: No focal deficit present.      Mental Status: He is alert.      Gait: Gait normal.   Psychiatric:         Judgment: Judgment normal.            Assessment:     1. S/P primary angioplasty with coronary stent     2. Mixed hyperlipidemia     3. Type 2 diabetes mellitus without complication, without long-term current use of insulin ([facility])     4. Essential hypertension     5. Coronary artery disease of native artery of native heart with stable angina pectoris ([facility])          Medical Decision Making:  Today's Assessment / Plan:   Ischemic Heart Disease, Stable  - S/P jorge DESx2 (?artery) Feb 2020 [facility]. Completed 1 yr of DAPT, cont aspirin 81mg  - high intensity statin: Atorvastatin 80.  LDL is <70 on labs from March 2022.  - beta blocker: carvedilol to 25 mg twice daily  - chest pain seems to be better with better BP control. Discussed signs of angina versus unstable angina, ER precautions    Hypertension, improved control  - goal <130/80 given his BP, Continue losartan-HCTZ, carvedilol. He will message me or his PCP if BPs are above goal. I would recommend adding dihydropyridine CCB if that is the case    Diabetes mellitus type 2  - started metformin with PCP, appreciate care    RTC in 6mo with cardiologist at [facility], previously saw Dr. Rondon. Sooner if problems.

## 2022-05-11 ENCOUNTER — OFFICE VISIT (OUTPATIENT)
Dept: CARDIOLOGY | Facility: MEDICAL CENTER | Age: 62
End: 2022-05-11
Payer: COMMERCIAL

## 2022-05-11 VITALS
DIASTOLIC BLOOD PRESSURE: 78 MMHG | OXYGEN SATURATION: 98 % | RESPIRATION RATE: 16 BRPM | HEIGHT: 64 IN | HEART RATE: 77 BPM | BODY MASS INDEX: 32.95 KG/M2 | SYSTOLIC BLOOD PRESSURE: 130 MMHG | WEIGHT: 193 LBS

## 2022-05-11 DIAGNOSIS — Z95.5 S/P PRIMARY ANGIOPLASTY WITH CORONARY STENT: ICD-10-CM

## 2022-05-11 DIAGNOSIS — E11.9 TYPE 2 DIABETES MELLITUS WITHOUT COMPLICATION, WITHOUT LONG-TERM CURRENT USE OF INSULIN (HCC): ICD-10-CM

## 2022-05-11 DIAGNOSIS — I10 ESSENTIAL HYPERTENSION: ICD-10-CM

## 2022-05-11 DIAGNOSIS — E78.2 MIXED HYPERLIPIDEMIA: ICD-10-CM

## 2022-05-11 DIAGNOSIS — I25.118 CORONARY ARTERY DISEASE OF NATIVE ARTERY OF NATIVE HEART WITH STABLE ANGINA PECTORIS (HCC): ICD-10-CM

## 2022-05-11 PROCEDURE — 99214 OFFICE O/P EST MOD 30 MIN: CPT | Performed by: PHYSICIAN ASSISTANT

## 2022-05-11 ASSESSMENT — ENCOUNTER SYMPTOMS
SHORTNESS OF BREATH: 1
NAUSEA: 0

## 2022-05-11 ASSESSMENT — LIFESTYLE VARIABLES: SUBSTANCE_ABUSE: 0

## 2022-05-11 ASSESSMENT — FIBROSIS 4 INDEX: FIB4 SCORE: 1.6

## 2022-05-11 NOTE — PATIENT INSTRUCTIONS
You can send me a [employer] message if your blood pressures are consistently over 130/80

## 2022-05-14 DIAGNOSIS — E11.9 TYPE 2 DIABETES MELLITUS WITHOUT COMPLICATION, WITHOUT LONG-TERM CURRENT USE OF INSULIN (HCC): ICD-10-CM

## 2022-05-16 RX ORDER — METFORMIN HYDROCHLORIDE 500 MG/1
1000 TABLET, EXTENDED RELEASE ORAL 2 TIMES DAILY
Qty: 180 TABLET | Refills: 3 | Status: SHIPPED | OUTPATIENT
Start: 2022-05-16

## 2022-07-06 ENCOUNTER — HOSPITAL ENCOUNTER (OUTPATIENT)
Dept: LAB | Facility: MEDICAL CENTER | Age: 62
End: 2022-07-06
Attending: FAMILY MEDICINE
Payer: COMMERCIAL

## 2022-07-06 DIAGNOSIS — E11.9 TYPE 2 DIABETES MELLITUS WITHOUT COMPLICATION, WITHOUT LONG-TERM CURRENT USE OF INSULIN (HCC): ICD-10-CM

## 2022-07-06 LAB
ALBUMIN SERPL BCP-MCNC: 4.1 G/DL (ref 3.2–4.9)
ALBUMIN/GLOB SERPL: 1.5 G/DL
ALP SERPL-CCNC: 45 U/L (ref 30–99)
ALT SERPL-CCNC: 25 U/L (ref 2–50)
ANION GAP SERPL CALC-SCNC: 9 MMOL/L (ref 7–16)
AST SERPL-CCNC: 20 U/L (ref 12–45)
BILIRUB SERPL-MCNC: 0.5 MG/DL (ref 0.1–1.5)
BUN SERPL-MCNC: 19 MG/DL (ref 8–22)
CALCIUM SERPL-MCNC: 8.5 MG/DL (ref 8.4–10.2)
CHLORIDE SERPL-SCNC: 106 MMOL/L (ref 96–112)
CO2 SERPL-SCNC: 23 MMOL/L (ref 20–33)
CREAT SERPL-MCNC: 0.74 MG/DL (ref 0.5–1.4)
EST. AVERAGE GLUCOSE BLD GHB EST-MCNC: 151 MG/DL
FASTING STATUS PATIENT QL REPORTED: NORMAL
GFR SERPLBLD CREATININE-BSD FMLA CKD-EPI: 101 ML/MIN/1.73 M 2
GLOBULIN SER CALC-MCNC: 2.8 G/DL (ref 1.9–3.5)
GLUCOSE SERPL-MCNC: 137 MG/DL (ref 65–99)
HBA1C MFR BLD: 6.9 % (ref 4–5.6)
POTASSIUM SERPL-SCNC: 4.2 MMOL/L (ref 3.6–5.5)
PROT SERPL-MCNC: 6.9 G/DL (ref 6–8.2)
SODIUM SERPL-SCNC: 138 MMOL/L (ref 135–145)

## 2022-07-06 PROCEDURE — 36415 COLL VENOUS BLD VENIPUNCTURE: CPT

## 2022-07-06 PROCEDURE — 83036 HEMOGLOBIN GLYCOSYLATED A1C: CPT

## 2022-07-06 PROCEDURE — 80053 COMPREHEN METABOLIC PANEL: CPT

## 2022-07-11 ENCOUNTER — APPOINTMENT (OUTPATIENT)
Dept: MEDICAL GROUP | Facility: MEDICAL CENTER | Age: 62
End: 2022-07-11
Payer: COMMERCIAL

## 2022-09-07 DIAGNOSIS — I10 ESSENTIAL HYPERTENSION: ICD-10-CM

## 2022-09-07 RX ORDER — LOSARTAN POTASSIUM AND HYDROCHLOROTHIAZIDE 25; 100 MG/1; MG/1
1 TABLET ORAL DAILY
Qty: 90 TABLET | Refills: 1 | Status: SHIPPED | OUTPATIENT
Start: 2022-09-07 | End: 2023-01-17

## 2022-10-10 ENCOUNTER — OFFICE VISIT (OUTPATIENT)
Dept: CARDIOLOGY | Facility: MEDICAL CENTER | Age: 62
End: 2022-10-10
Payer: COMMERCIAL

## 2022-10-10 VITALS
WEIGHT: 183 LBS | HEIGHT: 64 IN | OXYGEN SATURATION: 97 % | RESPIRATION RATE: 18 BRPM | DIASTOLIC BLOOD PRESSURE: 76 MMHG | SYSTOLIC BLOOD PRESSURE: 132 MMHG | BODY MASS INDEX: 31.24 KG/M2 | HEART RATE: 80 BPM

## 2022-10-10 DIAGNOSIS — E11.9 TYPE 2 DIABETES MELLITUS WITHOUT COMPLICATION, WITHOUT LONG-TERM CURRENT USE OF INSULIN (HCC): ICD-10-CM

## 2022-10-10 DIAGNOSIS — E78.2 MIXED HYPERLIPIDEMIA: ICD-10-CM

## 2022-10-10 DIAGNOSIS — I25.118 CORONARY ARTERY DISEASE OF NATIVE ARTERY OF NATIVE HEART WITH STABLE ANGINA PECTORIS (HCC): ICD-10-CM

## 2022-10-10 DIAGNOSIS — Z95.5 S/P PRIMARY ANGIOPLASTY WITH CORONARY STENT: ICD-10-CM

## 2022-10-10 DIAGNOSIS — I10 HTN (HYPERTENSION), MALIGNANT: ICD-10-CM

## 2022-10-10 PROCEDURE — 99214 OFFICE O/P EST MOD 30 MIN: CPT | Performed by: INTERNAL MEDICINE

## 2022-10-10 RX ORDER — EMPAGLIFLOZIN 10 MG/1
10 TABLET, FILM COATED ORAL DAILY
Qty: 90 TABLET | Refills: 3 | Status: SHIPPED | OUTPATIENT
Start: 2022-10-10 | End: 2023-01-17 | Stop reason: SDUPTHER

## 2022-10-10 ASSESSMENT — ENCOUNTER SYMPTOMS
EYE REDNESS: 0
BRUISES/BLEEDS EASILY: 0
COUGH: 0
CLAUDICATION: 0
SORE THROAT: 0
ORTHOPNEA: 0
FALLS: 0
BLOOD IN STOOL: 0
PALPITATIONS: 0
DIZZINESS: 0
PND: 0
SHORTNESS OF BREATH: 1
DEPRESSION: 0

## 2022-10-10 ASSESSMENT — FIBROSIS 4 INDEX: FIB4 SCORE: 1.26

## 2022-10-10 NOTE — PROGRESS NOTES
Chief Complaint   Patient presents with    HTN (Controlled)     F/V Dx: HTN (hypertension), malignant       Subjective:   Cheikh Chacko is a 62 y.o. male who presents today for cardiac care and evaluation due to abnormal EKG, HTN, established CAD s/p coronary stents in 2020.    In the interim, patient has been feeling some chest pain. Sharp sensation. Lasting for 1 hour at a time. + associated dyspnea.    I have independently interpreted and reviewed blood tests results with patient in clinic which shows normal LDL level 58, triglycerides 109, renal and liver function.      Past Medical History:   Diagnosis Date    Hyperlipidemia     Hypertension     Sleep apnea      Past Surgical History:   Procedure Laterality Date    DENTAL SURGERY      STENT PLACEMENT      CARDIAC     Family History   Problem Relation Age of Onset    Diabetes Sister     Diabetes Brother     Cancer Neg Hx     Heart Disease Neg Hx     Stroke Neg Hx     Alcohol/Drug Neg Hx      Social History     Socioeconomic History    Marital status:      Spouse name: Not on file    Number of children: Not on file    Years of education: Not on file    Highest education level: Not on file   Occupational History    Not on file   Tobacco Use    Smoking status: Former     Packs/day: 1.00     Years: 34.00     Pack years: 34.00     Types: Cigarettes     Quit date: 2/10/2020     Years since quittin.6    Smokeless tobacco: Never   Vaping Use    Vaping Use: Not on file   Substance and Sexual Activity    Alcohol use: No    Drug use: No    Sexual activity: Yes     Partners: Female     Comment: two children, and two step children    Other Topics Concern    Not on file   Social History Narrative    Not on file     Social Determinants of Health     Financial Resource Strain: Not on file   Food Insecurity: Not on file   Transportation Needs: Not on file   Physical Activity: Not on file   Stress: Not on file   Social Connections: Unknown    Frequency of  "Communication with Friends and Family: Not on file    Frequency of Social Gatherings with Friends and Family: Not on file    Attends Quaker Services: Not on file    Active Member of Clubs or Organizations: Not on file    Attends Club or Organization Meetings: Not on file    Marital Status:    Intimate Partner Violence: Not on file   Housing Stability: Not on file     No Known Allergies  Outpatient Encounter Medications as of 10/10/2022   Medication Sig Dispense Refill    Empagliflozin (JARDIANCE) 10 MG Tab Take 1 Tablet by mouth every day. 90 Tablet 3    losartan-hydrochlorothiazide (HYZAAR) 100-25 MG per tablet Take 1 Tablet by mouth every day. 90 Tablet 1    metFORMIN ER (GLUCOPHAGE XR) 500 MG TABLET SR 24 HR Take 2 Tablets by mouth in the morning and 2 Tablets in the evening. 180 Tablet 3    carvedilol (COREG) 25 MG Tab Take 1 Tablet by mouth 2 times a day with meals. 180 Tablet 3    aspirin (ASA) 81 MG Chew Tab chewable tablet Chew 81 mg every day.      atorvastatin (LIPITOR) 80 MG tablet Take 1 Tablet by mouth every day. 90 Tablet 3     No facility-administered encounter medications on file as of 10/10/2022.     Review of Systems   Constitutional:  Positive for malaise/fatigue.   HENT:  Negative for sore throat.    Eyes:  Negative for redness.   Respiratory:  Positive for shortness of breath. Negative for cough.    Cardiovascular:  Positive for chest pain. Negative for palpitations, orthopnea, claudication, leg swelling and PND.   Gastrointestinal:  Negative for blood in stool and melena.   Musculoskeletal:  Negative for falls.   Skin:  Negative for rash.   Neurological:  Negative for dizziness.   Endo/Heme/Allergies:  Does not bruise/bleed easily.   Psychiatric/Behavioral:  Negative for depression.       Objective:   /76 (BP Location: Left arm, Patient Position: Sitting, BP Cuff Size: Adult)   Pulse 80   Resp 18   Ht 1.626 m (5' 4\")   Wt 83 kg (183 lb)   SpO2 97%   BMI 31.41 kg/m² "     Physical Exam  Vitals and nursing note reviewed.   Constitutional:       General: He is not in acute distress.     Appearance: He is not diaphoretic.   HENT:      Head: Normocephalic and atraumatic.      Right Ear: External ear normal.      Left Ear: External ear normal.      Nose: No congestion or rhinorrhea.   Eyes:      General:         Right eye: No discharge.         Left eye: No discharge.   Neck:      Thyroid: No thyromegaly.      Vascular: No JVD.   Cardiovascular:      Rate and Rhythm: Normal rate and regular rhythm.      Pulses: Normal pulses.   Pulmonary:      Effort: No respiratory distress.   Abdominal:      General: There is no distension.      Tenderness: There is no abdominal tenderness.   Musculoskeletal:         General: No swelling or tenderness.      Right lower leg: No edema.      Left lower leg: No edema.   Skin:     General: Skin is warm and dry.   Neurological:      Mental Status: He is alert and oriented to person, place, and time.      Cranial Nerves: No cranial nerve deficit.   Psychiatric:         Behavior: Behavior normal.       Assessment:     1. S/P primary angioplasty with coronary stent  NM-CARDIAC PET    EC-ECHOCARDIOGRAM COMPLETE W/O CONT      2. Coronary artery disease of native artery of native heart with stable angina pectoris (HCC)  NM-CARDIAC PET    EC-ECHOCARDIOGRAM COMPLETE W/O CONT      3. Mixed hyperlipidemia  NM-CARDIAC PET    EC-ECHOCARDIOGRAM COMPLETE W/O CONT      4. HTN (hypertension), malignant  NM-CARDIAC PET    EC-ECHOCARDIOGRAM COMPLETE W/O CONT      5. Type 2 diabetes mellitus without complication, without long-term current use of insulin (HCC)  NM-CARDIAC PET    EC-ECHOCARDIOGRAM COMPLETE W/O CONT          Medical Decision Making:  Today's Assessment / Status / Plan:   Coronary arterial disease s/p 2 stents in 02/2020 (unknown distribution):  At this time, to further risk stratify, I will order a transthoracic echocardiogram to assess cardiac and valvular  functions. I will also order a myocardial PET scan stress test to assess for coronary ischemia.    Betablocker as Carvedilol 25 mg po 2x daily.  ASA 81 mg po daily.  Statin as Atorvastatin 80 mg po daily  ARB as Losartan 100 mg po daily.     Hypertension:  Optimize control with BB, ARB as permitted above in conjunction with CAD treatment.  Continue HCTZ 25 mg daily.     Hyperlipidemia:  Optimize statin as within guidelines of CAD treatment as above.    In terms of patient's co-morbiditie such as established cardiovascular disease and type II diabetes mellitus, based on recent trial, patient is indicated to be placed on Empagliflozin for mortality reduction benefit. Based on recent data, such therapy has a relative risk reduction of 38% and absolute risk reduction of 2.2% for cardiovascular death. I will start patient on Jardiance 10 mg once a day. I discussed with patient about potential benefits and risks. Patient understands that this is an adjunct to current regimen of sugar lowering agents and cardiac agents.    Overall, based on prior history of obstructive coronary arterial disease, patient is at at high risk for recurrent events and will require consistent ongoing guidelines directed medical therapy to reduce his cardiovascular mortality and associated complications.    I will see patient back in clinic with lab tests and studies results in 3 months.    I thank you for referring patient to our Cardiology Clinic today.      Donald Rondon MD.   [facility].  [phone]  [place], [place].

## 2022-10-12 ENCOUNTER — PATIENT MESSAGE (OUTPATIENT)
Dept: HEALTH INFORMATION MANAGEMENT | Facility: OTHER | Age: 62
End: 2022-10-12

## 2022-10-12 ENCOUNTER — HOSPITAL ENCOUNTER (OUTPATIENT)
Dept: CARDIOLOGY | Facility: MEDICAL CENTER | Age: 62
End: 2022-10-12
Attending: INTERNAL MEDICINE
Payer: COMMERCIAL

## 2022-10-12 DIAGNOSIS — E78.2 MIXED HYPERLIPIDEMIA: ICD-10-CM

## 2022-10-12 DIAGNOSIS — Z95.5 S/P PRIMARY ANGIOPLASTY WITH CORONARY STENT: ICD-10-CM

## 2022-10-12 DIAGNOSIS — I10 HTN (HYPERTENSION), MALIGNANT: ICD-10-CM

## 2022-10-12 DIAGNOSIS — I25.118 CORONARY ARTERY DISEASE OF NATIVE ARTERY OF NATIVE HEART WITH STABLE ANGINA PECTORIS (HCC): ICD-10-CM

## 2022-10-12 DIAGNOSIS — E11.9 TYPE 2 DIABETES MELLITUS WITHOUT COMPLICATION, WITHOUT LONG-TERM CURRENT USE OF INSULIN (HCC): ICD-10-CM

## 2022-10-12 LAB
LV EJECT FRACT  99904: 45
LV EJECT FRACT MOD 2C 99903: 45.37
LV EJECT FRACT MOD 4C 99902: 37.14
LV EJECT FRACT MOD BP 99901: 43.43

## 2022-10-12 PROCEDURE — 93306 TTE W/DOPPLER COMPLETE: CPT | Mod: 26 | Performed by: INTERNAL MEDICINE

## 2022-10-12 PROCEDURE — 93306 TTE W/DOPPLER COMPLETE: CPT

## 2022-10-12 NOTE — RESULT ENCOUNTER NOTE
Dear Neida,    Can you please let Cheikh Chacko know that result is not entirely normal and I will see patient SOONER than scheduled to discuss treatment?    Thank you,  Michel.

## 2022-10-13 ENCOUNTER — TELEPHONE (OUTPATIENT)
Dept: CARDIOLOGY | Facility: MEDICAL CENTER | Age: 62
End: 2022-10-13
Payer: COMMERCIAL

## 2022-10-14 ENCOUNTER — TELEPHONE (OUTPATIENT)
Dept: CARDIOLOGY | Facility: MEDICAL CENTER | Age: 62
End: 2022-10-14
Payer: COMMERCIAL

## 2022-10-14 NOTE — TELEPHONE ENCOUNTER
Cheikh Chacko Key: [id number] - PA Case ID: [id number] - Rx #: [id number] help? Call us at [phone]  Status  Sent to [facility]  Jardiance 10MG tablets  Form  [employer] PA Form (2017 NCPDP)  Original Claim Info  76,75,75 USE GENERIC METFORMINDRUG REQUIRES PRIOR AUTHORIZATION

## 2022-10-20 ENCOUNTER — HOSPITAL ENCOUNTER (OUTPATIENT)
Dept: RADIOLOGY | Facility: MEDICAL CENTER | Age: 62
End: 2022-10-20
Attending: FAMILY MEDICINE
Payer: COMMERCIAL

## 2022-10-20 ENCOUNTER — OFFICE VISIT (OUTPATIENT)
Dept: MEDICAL GROUP | Facility: MEDICAL CENTER | Age: 62
End: 2022-10-20
Payer: COMMERCIAL

## 2022-10-20 VITALS
HEART RATE: 76 BPM | RESPIRATION RATE: 18 BRPM | HEIGHT: 64 IN | BODY MASS INDEX: 32.18 KG/M2 | OXYGEN SATURATION: 97 % | WEIGHT: 188.49 LBS | TEMPERATURE: 97.1 F | DIASTOLIC BLOOD PRESSURE: 84 MMHG | SYSTOLIC BLOOD PRESSURE: 112 MMHG

## 2022-10-20 DIAGNOSIS — Z87.891 HISTORY OF CIGARETTE SMOKING: ICD-10-CM

## 2022-10-20 DIAGNOSIS — I10 ESSENTIAL HYPERTENSION: ICD-10-CM

## 2022-10-20 DIAGNOSIS — E11.69 TYPE 2 DIABETES MELLITUS WITH OTHER SPECIFIED COMPLICATION, WITHOUT LONG-TERM CURRENT USE OF INSULIN (HCC): ICD-10-CM

## 2022-10-20 DIAGNOSIS — E78.5 DYSLIPIDEMIA ASSOCIATED WITH TYPE 2 DIABETES MELLITUS (HCC): ICD-10-CM

## 2022-10-20 DIAGNOSIS — R10.9 ACUTE RIGHT FLANK PAIN: ICD-10-CM

## 2022-10-20 DIAGNOSIS — E11.69 DYSLIPIDEMIA ASSOCIATED WITH TYPE 2 DIABETES MELLITUS (HCC): ICD-10-CM

## 2022-10-20 DIAGNOSIS — Z12.5 SCREENING FOR PROSTATE CANCER: ICD-10-CM

## 2022-10-20 LAB
APPEARANCE UR: CLEAR
BILIRUB UR STRIP-MCNC: NORMAL MG/DL
COLOR UR AUTO: YELLOW
GLUCOSE UR STRIP.AUTO-MCNC: NEGATIVE MG/DL
KETONES UR STRIP.AUTO-MCNC: NEGATIVE MG/DL
LEUKOCYTE ESTERASE UR QL STRIP.AUTO: NEGATIVE
NITRITE UR QL STRIP.AUTO: NEGATIVE
PH UR STRIP.AUTO: 5.5 [PH] (ref 5–8)
PROT UR QL STRIP: NORMAL MG/DL
RBC UR QL AUTO: NEGATIVE
SP GR UR STRIP.AUTO: 1.02
UROBILINOGEN UR STRIP-MCNC: 0.2 MG/DL

## 2022-10-20 PROCEDURE — 74176 CT ABD & PELVIS W/O CONTRAST: CPT

## 2022-10-20 PROCEDURE — 81002 URINALYSIS NONAUTO W/O SCOPE: CPT | Performed by: FAMILY MEDICINE

## 2022-10-20 PROCEDURE — 99214 OFFICE O/P EST MOD 30 MIN: CPT | Performed by: FAMILY MEDICINE

## 2022-10-20 RX ORDER — CYCLOBENZAPRINE HCL 5 MG
5 TABLET ORAL 3 TIMES DAILY PRN
Qty: 30 TABLET | Refills: 1 | Status: SHIPPED | OUTPATIENT
Start: 2022-10-20

## 2022-10-20 ASSESSMENT — ENCOUNTER SYMPTOMS
CHILLS: 0
FEVER: 0
FLANK PAIN: 1
PALPITATIONS: 0

## 2022-10-20 ASSESSMENT — FIBROSIS 4 INDEX: FIB4 SCORE: 1.26

## 2022-10-20 NOTE — ASSESSMENT & PLAN NOTE
New problem, point-of-care urinalysis came back negative for any blood, showed protein which is due to diabetes.  Check CT renal to rule out kidney stone.  Prescribed Flexeril muscle relaxer.

## 2022-10-20 NOTE — ASSESSMENT & PLAN NOTE
Chronic problem, A1c remains stable, goal A1c is less than 7, continue Jardiance 10 mg daily.  Counseled regarding eating healthy diet and exercise.  Recheck labs in 6 months.

## 2022-10-20 NOTE — PROGRESS NOTES
FAMILY MEDICINE VISIT                                                               Chief complaint::Diagnoses of Type 2 diabetes mellitus with other specified complication, without long-term current use of insulin (HCC), Dyslipidemia associated with type 2 diabetes mellitus (HCC), Essential hypertension, History of tobacco abuse (Quit >6 mos ago), Acute right flank pain, and Screening for prostate cancer were pertinent to this visit.    History of present illness: Cheikh Chacko is a 62 y.o. male who presented for lab follow-up, right flank pain    Problem   History of tobacco abuse (Quit >6 mos ago)    He smoked about 1 pack a day for 34 years and quit 3 years ago     Acute Right Flank Pain    He reports that he is having right flank pain since 2 weeks.  Pain does go down to his hip area also.  Denies any trauma to his back.  No numbness and tingling in lower extremities.  No urinary problem.  Denies any blood in urine.  Reports that is not sure what is going on, may be a kidney stone.  Rates pain 4-5 out of 10.  Pain gets more worse when he sits and gets better when he is walking     Type 2 Diabetes Mellitus With Other Specified Complication (Hcc)    His A1c came back at 6.9.  He has history of MI.  He is currently on Jardiance 10 mg daily.    Lab Results   Component Value Date/Time    HBA1C 6.9 (H) 07/06/2022 10:25 AM      Up-to-date for foot exam and eye exam.     Dyslipidemia Associated With Type 2 Diabetes Mellitus (Hcc)    He is currently on Lipitor 80 mg daily.  No side effects with medication.    Lab Results   Component Value Date/Time    CHOLSTRLTOT 129 03/21/2022 0930    TRIGLYCERIDE 109 03/21/2022 0930    HDL 49 03/21/2022 0930    LDL 58 03/21/2022 0930        Essential Hypertension    He is on Coreg 25 mg twice daily and hyzaar 1 tablet daily.  His Coreg medication dose was increased by cardiology.  No side effects with this medication.  Blood pressure today 112/84.  No chest pain, palpitations,  "shortness of breath, lower extremity swelling.              Review of systems:     Review of Systems   Constitutional:  Negative for chills, fever and malaise/fatigue.   Cardiovascular:  Negative for chest pain, palpitations and leg swelling.   Genitourinary:  Positive for flank pain. Negative for dysuria, frequency, hematuria and urgency.      Medications and Allergies:     Current Outpatient Medications   Medication Sig Dispense Refill    cyclobenzaprine (FLEXERIL) 5 mg tablet Take 1 Tablet by mouth 3 times a day as needed for Muscle Spasms. 30 Tablet 1    Empagliflozin (JARDIANCE) 10 MG Tab Take 1 Tablet by mouth every day. 90 Tablet 3    losartan-hydrochlorothiazide (HYZAAR) 100-25 MG per tablet Take 1 Tablet by mouth every day. 90 Tablet 1    metFORMIN ER (GLUCOPHAGE XR) 500 MG TABLET SR 24 HR Take 2 Tablets by mouth in the morning and 2 Tablets in the evening. 180 Tablet 3    carvedilol (COREG) 25 MG Tab Take 1 Tablet by mouth 2 times a day with meals. 180 Tablet 3    aspirin (ASA) 81 MG Chew Tab chewable tablet Chew 81 mg every day.      atorvastatin (LIPITOR) 80 MG tablet Take 1 Tablet by mouth every day. 90 Tablet 3     No current facility-administered medications for this visit.          Vitals:    /84 (BP Location: Left arm, Patient Position: Sitting, BP Cuff Size: Large adult)   Pulse 76   Temp 36.2 °C (97.1 °F) (Temporal)   Resp 18   Ht 1.626 m (5' 4\")   Wt 85.5 kg (188 lb 7.9 oz)   SpO2 97%  Body mass index is 32.35 kg/m².    Physical Exam:     Physical Exam  Constitutional:       General: He is not in acute distress.  HENT:      Head: Normocephalic and atraumatic.   Eyes:      Conjunctiva/sclera: Conjunctivae normal.   Cardiovascular:      Rate and Rhythm: Normal rate and regular rhythm.      Heart sounds: Normal heart sounds. No murmur heard.    No friction rub. No gallop.   Pulmonary:      Effort: Pulmonary effort is normal. No respiratory distress.      Breath sounds: Normal breath " sounds. No wheezing or rales.   Abdominal:      Tenderness: There is right CVA tenderness.      Comments: Tender to palpate at moderate tenderness on palpation at right flank   Musculoskeletal:         General: No deformity.      Cervical back: Neck supple.   Neurological:      Mental Status: He is alert.      Gait: Gait is intact.   Psychiatric:         Mood and Affect: Mood and affect normal.         Judgment: Judgment normal.        Labs:  I reviewed with patient recent labs resulted on 7/6/2022    Assessment/Plan:         Problem List Items Addressed This Visit       Acute right flank pain     New problem, point-of-care urinalysis came back negative for any blood, showed protein which is due to diabetes.  Check CT renal to rule out kidney stone.  Prescribed Flexeril muscle relaxer.         Relevant Medications    cyclobenzaprine (FLEXERIL) 5 mg tablet    Other Relevant Orders    POCT Urinalysis (Completed)    CT-RENAL COLIC EVALUATION(A/P W/O)    Dyslipidemia associated with type 2 diabetes mellitus (HCC)     Chronic problem, stable, continue same medication regimen.  Recheck labs in 6-month         Relevant Orders    Lipid Profile    Essential hypertension     Chronic problem, stable, continue same medication regimen.         Relevant Orders    Comp Metabolic Panel    History of tobacco abuse (Quit >6 mos ago)     Continue to refrain from smoking.  Referral to lung cancer screening program         Relevant Orders    REFERRAL TO LUNG CANCER SCREENING PROGRAM    Type 2 diabetes mellitus with other specified complication (HCC)     Chronic problem, A1c remains stable, goal A1c is less than 7, continue Jardiance 10 mg daily.  Counseled regarding eating healthy diet and exercise.  Recheck labs in 6 months.         Relevant Orders    HEMOGLOBIN A1C    MICROALBUMIN CREAT RATIO URINE     Other Visit Diagnoses       Screening for prostate cancer        Relevant Orders    PROSTATE SPECIFIC AG SCREENING             Please  note that this dictation was created using voice recognition software. I have made every reasonable attempt to correct obvious errors, but I expect that there are errors of grammar and possibly content that I did not discover before finalizing the note.    Follow up in 6 months for labs, 1 month for pain and CT

## 2022-10-24 ENCOUNTER — TELEPHONE (OUTPATIENT)
Dept: HEMATOLOGY ONCOLOGY | Facility: MEDICAL CENTER | Age: 62
End: 2022-10-24
Payer: COMMERCIAL

## 2022-10-24 NOTE — TELEPHONE ENCOUNTER
CT for lung cancer screening order placed in January 2022.  Please let patient know about this to do this test before January 2023.

## 2022-10-24 NOTE — TELEPHONE ENCOUNTER
SDM appt completed on 4/23/2019    Our records indicate that your patient is due for his ANNUAL LDCT. Please place an order for the LDCT by selecting the LUNG CANCER SCREENING CT, when asked if the patient has completed a Shared Decision Making Appointment select yes. You will need to verify eligibility each year to ensure your patient meets criteria.     Eligibility for lung cancer screening program is based on best practice guidelines approved through the [facility]:  Age 50-80 yrs of age   20 pack year hx of smoking, or greater   Current smoker or if quit, must have quit within last 15 yrs   Asymptomatic (no signs or symptoms of lung cancer)    No previous history of lung cancer   No Chest CT within past 12 mos.

## 2022-12-02 ENCOUNTER — HOSPITAL ENCOUNTER (OUTPATIENT)
Dept: RADIOLOGY | Facility: MEDICAL CENTER | Age: 62
End: 2022-12-02
Attending: INTERNAL MEDICINE
Payer: COMMERCIAL

## 2022-12-02 DIAGNOSIS — I10 HTN (HYPERTENSION), MALIGNANT: ICD-10-CM

## 2022-12-02 DIAGNOSIS — Z95.5 S/P PRIMARY ANGIOPLASTY WITH CORONARY STENT: ICD-10-CM

## 2022-12-02 DIAGNOSIS — E11.9 TYPE 2 DIABETES MELLITUS WITHOUT COMPLICATION, WITHOUT LONG-TERM CURRENT USE OF INSULIN (HCC): ICD-10-CM

## 2022-12-02 DIAGNOSIS — E78.2 MIXED HYPERLIPIDEMIA: ICD-10-CM

## 2022-12-02 DIAGNOSIS — I25.118 CORONARY ARTERY DISEASE OF NATIVE ARTERY OF NATIVE HEART WITH STABLE ANGINA PECTORIS (HCC): ICD-10-CM

## 2022-12-02 PROCEDURE — 78431 MYOCRD IMG PET RST&STRS CT: CPT

## 2022-12-05 PROCEDURE — 93018 CV STRESS TEST I&R ONLY: CPT | Performed by: INTERNAL MEDICINE

## 2022-12-05 PROCEDURE — 78431 MYOCRD IMG PET RST&STRS CT: CPT | Mod: 26 | Performed by: INTERNAL MEDICINE

## 2023-01-17 ENCOUNTER — OFFICE VISIT (OUTPATIENT)
Dept: CARDIOLOGY | Facility: MEDICAL CENTER | Age: 63
End: 2023-01-17
Payer: MEDICARE

## 2023-01-17 ENCOUNTER — TELEPHONE (OUTPATIENT)
Dept: CARDIOLOGY | Facility: MEDICAL CENTER | Age: 63
End: 2023-01-17

## 2023-01-17 VITALS
HEIGHT: 64 IN | WEIGHT: 191 LBS | HEART RATE: 88 BPM | SYSTOLIC BLOOD PRESSURE: 140 MMHG | DIASTOLIC BLOOD PRESSURE: 88 MMHG | OXYGEN SATURATION: 97 % | BODY MASS INDEX: 32.61 KG/M2 | RESPIRATION RATE: 14 BRPM

## 2023-01-17 DIAGNOSIS — Z95.5 S/P PRIMARY ANGIOPLASTY WITH CORONARY STENT: ICD-10-CM

## 2023-01-17 DIAGNOSIS — E11.9 TYPE 2 DIABETES MELLITUS WITHOUT COMPLICATION, WITHOUT LONG-TERM CURRENT USE OF INSULIN (HCC): ICD-10-CM

## 2023-01-17 DIAGNOSIS — I10 ESSENTIAL HYPERTENSION: ICD-10-CM

## 2023-01-17 DIAGNOSIS — E78.2 MIXED HYPERLIPIDEMIA: ICD-10-CM

## 2023-01-17 DIAGNOSIS — R06.09 DYSPNEA ON EXERTION: ICD-10-CM

## 2023-01-17 DIAGNOSIS — I10 HTN (HYPERTENSION), MALIGNANT: ICD-10-CM

## 2023-01-17 DIAGNOSIS — I25.118 CORONARY ARTERY DISEASE OF NATIVE ARTERY OF NATIVE HEART WITH STABLE ANGINA PECTORIS (HCC): ICD-10-CM

## 2023-01-17 DIAGNOSIS — I50.20 ACC/AHA STAGE C SYSTOLIC HEART FAILURE (HCC): ICD-10-CM

## 2023-01-17 DIAGNOSIS — I51.9 ORGANIC HEART DISEASE, NYHA CLASS 2: ICD-10-CM

## 2023-01-17 DIAGNOSIS — I21.09 ST ELEVATION (STEMI) MYOCARDIAL INFARCTION INVOLVING OTHER CORONARY ARTERY OF ANTERIOR WALL (HCC): ICD-10-CM

## 2023-01-17 PROCEDURE — 99215 OFFICE O/P EST HI 40 MIN: CPT | Performed by: INTERNAL MEDICINE

## 2023-01-17 RX ORDER — SPIRONOLACTONE 25 MG/1
25 TABLET ORAL DAILY
Qty: 90 TABLET | Refills: 3 | Status: SHIPPED | OUTPATIENT
Start: 2023-01-17 | End: 2024-02-29

## 2023-01-17 RX ORDER — SACUBITRIL AND VALSARTAN 49; 51 MG/1; MG/1
1 TABLET, FILM COATED ORAL 2 TIMES DAILY
Qty: 60 TABLET | Refills: 11 | Status: SHIPPED | OUTPATIENT
Start: 2023-01-17 | End: 2023-03-14

## 2023-01-17 RX ORDER — EMPAGLIFLOZIN 10 MG/1
10 TABLET, FILM COATED ORAL DAILY
Qty: 90 TABLET | Refills: 3 | Status: SHIPPED | OUTPATIENT
Start: 2023-01-17 | End: 2024-01-03 | Stop reason: SDUPTHER

## 2023-01-17 ASSESSMENT — ENCOUNTER SYMPTOMS
ORTHOPNEA: 0
PALPITATIONS: 0
DEPRESSION: 0
DIZZINESS: 0
SHORTNESS OF BREATH: 1
EYE REDNESS: 0
FALLS: 0
BRUISES/BLEEDS EASILY: 0
COUGH: 0
PND: 0
BLOOD IN STOOL: 0
SORE THROAT: 0
CLAUDICATION: 0

## 2023-01-17 ASSESSMENT — FIBROSIS 4 INDEX: FIB4 SCORE: 1.26

## 2023-01-17 NOTE — PATIENT INSTRUCTIONS
Will switch Losartan to Entresto 49/51 mg BID.  Will add Spironolactone 25 mg daily.  Will add Jardiance 10 mg daily.

## 2023-01-17 NOTE — TELEPHONE ENCOUNTER
Attempted to contact patient, had to [facility] for pt to schedule follow up / echo. [facility] with scheduling phone number [phone].

## 2023-01-17 NOTE — PROGRESS NOTES
Chief Complaint   Patient presents with    Follow-Up     Dx: ST elevation (STEMI) myocardial infarction involving other coronary artery of anterior wall (HCC)    Hypertension       Subjective:   Cheikh Chacko is a 62 y.o. male who presents today for cardiac care and evaluation due to abnormal EKG, HTN, established CAD s/p coronary stents in 2020.    LVEF of 45 % with global hypokinesis. No significant valvular disease. I have independently interpreted and reviewed echocardiogram's actual images.     Negative PET scan stress test.    No new blood tests.    Insurance did not approve Jardiance.    Patient does get winded upon walking up inclines or for distance. No symptoms at rest or with daily living activities.    Past Medical History:   Diagnosis Date    Hyperlipidemia     Hypertension     Sleep apnea      Past Surgical History:   Procedure Laterality Date    DENTAL SURGERY      STENT PLACEMENT      CARDIAC     Family History   Problem Relation Age of Onset    Diabetes Sister     Diabetes Brother     Cancer Neg Hx     Heart Disease Neg Hx     Stroke Neg Hx     Alcohol/Drug Neg Hx      Social History     Socioeconomic History    Marital status:      Spouse name: Not on file    Number of children: Not on file    Years of education: Not on file    Highest education level: Not on file   Occupational History    Not on file   Tobacco Use    Smoking status: Former     Packs/day: 1.00     Years: 34.00     Pack years: 34.00     Types: Cigarettes     Quit date: 2/10/2020     Years since quittin.9    Smokeless tobacco: Never   Vaping Use    Vaping Use: Not on file   Substance and Sexual Activity    Alcohol use: No    Drug use: No    Sexual activity: Yes     Partners: Female     Comment: two children, and two step children    Other Topics Concern    Not on file   Social History Narrative    Not on file     Social Determinants of Health     Financial Resource Strain: Not on file   Food Insecurity: Not on file    Transportation Needs: Not on file   Physical Activity: Not on file   Stress: Not on file   Social Connections: Unknown    Frequency of Communication with Friends and Family: Not on file    Frequency of Social Gatherings with Friends and Family: Not on file    Attends Hoahaoism Services: Not on file    Active Member of Clubs or Organizations: Not on file    Attends Club or Organization Meetings: Not on file    Marital Status:    Intimate Partner Violence: Not on file   Housing Stability: Not on file     No Known Allergies  Outpatient Encounter Medications as of 1/17/2023   Medication Sig Dispense Refill    sacubitril-valsartan (ENTRESTO) 49-51 MG Tab Take 1 Tablet by mouth 2 times a day. 60 Tablet 11    Empagliflozin (JARDIANCE) 10 MG Tab Take 1 Tablet by mouth every day. 90 Tablet 3    spironolactone (ALDACTONE) 25 MG Tab Take 1 Tablet by mouth every day. 90 Tablet 3    cyclobenzaprine (FLEXERIL) 5 mg tablet Take 1 Tablet by mouth 3 times a day as needed for Muscle Spasms. 30 Tablet 1    metFORMIN ER (GLUCOPHAGE XR) 500 MG TABLET SR 24 HR Take 2 Tablets by mouth in the morning and 2 Tablets in the evening. 180 Tablet 3    carvedilol (COREG) 25 MG Tab Take 1 Tablet by mouth 2 times a day with meals. 180 Tablet 3    aspirin (ASA) 81 MG Chew Tab chewable tablet Chew 81 mg every day.      atorvastatin (LIPITOR) 80 MG tablet Take 1 Tablet by mouth every day. 90 Tablet 3    [DISCONTINUED] Empagliflozin (JARDIANCE) 10 MG Tab Take 1 Tablet by mouth every day. (Patient not taking: Reported on 1/17/2023) 90 Tablet 3    [DISCONTINUED] losartan-hydrochlorothiazide (HYZAAR) 100-25 MG per tablet Take 1 Tablet by mouth every day. 90 Tablet 1     No facility-administered encounter medications on file as of 1/17/2023.     Review of Systems   Constitutional:  Positive for malaise/fatigue.   HENT:  Negative for sore throat.    Eyes:  Negative for redness.   Respiratory:  Positive for shortness of breath. Negative for cough.   "  Cardiovascular:  Positive for chest pain. Negative for palpitations, orthopnea, claudication, leg swelling and PND.   Gastrointestinal:  Negative for blood in stool and melena.   Musculoskeletal:  Negative for falls.   Skin:  Negative for rash.   Neurological:  Negative for dizziness.   Endo/Heme/Allergies:  Does not bruise/bleed easily.   Psychiatric/Behavioral:  Negative for depression.       Objective:   BP (!) 140/88 (BP Location: Left arm, Patient Position: Sitting, BP Cuff Size: Adult)   Pulse 88   Resp 14   Ht 1.626 m (5' 4\")   Wt 86.6 kg (191 lb)   SpO2 97%   BMI 32.79 kg/m²     Physical Exam  Vitals and nursing note reviewed.   Constitutional:       General: He is not in acute distress.     Appearance: He is not diaphoretic.   HENT:      Head: Normocephalic and atraumatic.      Right Ear: External ear normal.      Left Ear: External ear normal.      Nose: No congestion or rhinorrhea.   Eyes:      General:         Right eye: No discharge.         Left eye: No discharge.   Neck:      Thyroid: No thyromegaly.      Vascular: No JVD.   Cardiovascular:      Rate and Rhythm: Normal rate and regular rhythm.      Pulses: Normal pulses.   Pulmonary:      Effort: No respiratory distress.   Abdominal:      General: There is no distension.      Tenderness: There is no abdominal tenderness.   Musculoskeletal:         General: No swelling or tenderness.      Right lower leg: No edema.      Left lower leg: No edema.   Skin:     General: Skin is warm and dry.   Neurological:      Mental Status: He is alert and oriented to person, place, and time.      Cranial Nerves: No cranial nerve deficit.   Psychiatric:         Behavior: Behavior normal.       Assessment:     1. ACC/AHA stage C systolic heart failure (HCC)  Basic Metabolic Panel    LIPID PANEL    proBrain Natriuretic Peptide, NT    EC-ECHOCARDIOGRAM LTD W/O CONT      2. Organic heart disease, NYHA class 2  Basic Metabolic Panel    LIPID PANEL    proBrain " Natriuretic Peptide, NT    EC-ECHOCARDIOGRAM LTD W/O CONT      3. S/P primary angioplasty with coronary stent        4. Coronary artery disease of native artery of native heart with stable angina pectoris (HCC)        5. Mixed hyperlipidemia        6. HTN (hypertension), malignant        7. Type 2 diabetes mellitus without complication, without long-term current use of insulin ([facility])  Basic Metabolic Panel    LIPID PANEL      8. ST elevation (STEMI) myocardial infarction involving other coronary artery of anterior wall (HCC)        9. Essential hypertension        10. Dyspnea on exertion  proBrain Natriuretic Peptide, NT    EC-ECHOCARDIOGRAM LTD W/O CONT          Medical Decision Making:  Today's Assessment / Status / Plan:   Coronary arterial disease s/p 2 stents in 02/2020 (unknown distribution) with cardiomyopathy with LVEF of 45%:    Betablocker as Carvedilol 25 mg po 2x daily.  ASA 81 mg po daily.  Statin as Atorvastatin 80 mg po daily  Will switch Losartan to Entresto 49/51 mg BID.  Will add Spironolactone 25 mg daily.    Based on recent data on SGLT2 and heart failure with reduced ejection fraction, patient will be benefited from Jardiance 10 mg p.o. once a day for further reduction in mortality and hospitalization with absolute risk reduction of 5.2%.  Therefore, I will start patient on Jardiance 10 mg p.o. once a day.  Risks and benefits were explained to patient and patient has agreed to proceed.    Repeat TTE in 6 months.     Hypertension:  Optimize control with BB, ARNI as permitted above in conjunction with CAD treatment.     Hyperlipidemia:  Optimize statin as within guidelines of CAD treatment as above.    In terms of patient's co-morbiditie such as established cardiovascular disease and type II diabetes mellitus, based on recent trial, patient is indicated to be placed on Empagliflozin for mortality reduction benefit. Based on recent data, such therapy has a relative risk reduction of 38% and absolute  risk reduction of 2.2% for cardiovascular death. I will start patient on Jardiance 10 mg once a day. I discussed with patient about potential benefits and risks. Patient understands that this is an adjunct to current regimen of sugar lowering agents and cardiac agents.    Overall, based on prior history of obstructive coronary arterial disease, patient is at at high risk for recurrent events and will require consistent ongoing guidelines directed medical therapy to reduce his cardiovascular mortality and associated complications.    I thank you for referring patient to our Cardiology Clinic today.      Donald Rondon MD.   [facility].  [phone]  [place], [place].

## 2023-01-23 ENCOUNTER — TELEPHONE (OUTPATIENT)
Dept: CARDIOLOGY | Facility: MEDICAL CENTER | Age: 63
End: 2023-01-23
Payer: COMMERCIAL

## 2023-01-23 NOTE — TELEPHONE ENCOUNTER
PA FAXED TO PLAN      Cheikh Chacko (Key: [id number])Need help? Call us at [phone]  Outcome  N/A  No matching products found. Can't proceed with creation of EOC.  Next Steps  The plan will fax you a determination, typically within 1 to 5 business days.    How do I follow up?  Drug  Jardiance 10MG tablets  Form  [place] [address] [facility] [employer] Prior Authorization Form  Prior Authorization for [place] [facility] Commercial  [phone]  [phone]

## 2023-02-02 NOTE — TELEPHONE ENCOUNTER
Original PA submitted incorrectly. PA re-submitted with correct insurance info. No PA needed, medication available without authorization.

## 2023-03-09 ENCOUNTER — HOSPITAL ENCOUNTER (OUTPATIENT)
Dept: LAB | Facility: MEDICAL CENTER | Age: 63
End: 2023-03-09
Attending: INTERNAL MEDICINE
Payer: COMMERCIAL

## 2023-03-09 DIAGNOSIS — E78.2 MIXED HYPERLIPIDEMIA: ICD-10-CM

## 2023-03-09 DIAGNOSIS — E11.9 TYPE 2 DIABETES MELLITUS WITHOUT COMPLICATION, WITHOUT LONG-TERM CURRENT USE OF INSULIN (HCC): ICD-10-CM

## 2023-03-09 DIAGNOSIS — I51.9 ORGANIC HEART DISEASE, NYHA CLASS 2: ICD-10-CM

## 2023-03-09 DIAGNOSIS — I50.20 ACC/AHA STAGE C SYSTOLIC HEART FAILURE (HCC): ICD-10-CM

## 2023-03-09 DIAGNOSIS — R06.09 DYSPNEA ON EXERTION: ICD-10-CM

## 2023-03-09 LAB
ANION GAP SERPL CALC-SCNC: 11 MMOL/L (ref 7–16)
BUN SERPL-MCNC: 18 MG/DL (ref 8–22)
CALCIUM SERPL-MCNC: 8.7 MG/DL (ref 8.4–10.2)
CHLORIDE SERPL-SCNC: 104 MMOL/L (ref 96–112)
CHOLEST SERPL-MCNC: 125 MG/DL (ref 100–199)
CO2 SERPL-SCNC: 21 MMOL/L (ref 20–33)
CREAT SERPL-MCNC: 0.77 MG/DL (ref 0.5–1.4)
FASTING STATUS PATIENT QL REPORTED: NORMAL
GFR SERPLBLD CREATININE-BSD FMLA CKD-EPI: 99 ML/MIN/1.73 M 2
GLUCOSE SERPL-MCNC: 142 MG/DL (ref 65–99)
HDLC SERPL-MCNC: 45 MG/DL
LDLC SERPL CALC-MCNC: 49 MG/DL
NT-PROBNP SERPL IA-MCNC: 44 PG/ML (ref 0–125)
POTASSIUM SERPL-SCNC: 4.2 MMOL/L (ref 3.6–5.5)
SODIUM SERPL-SCNC: 136 MMOL/L (ref 135–145)
TRIGL SERPL-MCNC: 153 MG/DL (ref 0–149)

## 2023-03-09 PROCEDURE — 36415 COLL VENOUS BLD VENIPUNCTURE: CPT

## 2023-03-09 PROCEDURE — 80048 BASIC METABOLIC PNL TOTAL CA: CPT

## 2023-03-09 PROCEDURE — 83880 ASSAY OF NATRIURETIC PEPTIDE: CPT

## 2023-03-09 PROCEDURE — 80061 LIPID PANEL: CPT

## 2023-03-09 RX ORDER — ATORVASTATIN CALCIUM 80 MG/1
80 TABLET, FILM COATED ORAL DAILY
Qty: 90 TABLET | Refills: 3 | Status: SHIPPED | OUTPATIENT
Start: 2023-03-09

## 2023-03-13 DIAGNOSIS — I10 ESSENTIAL HYPERTENSION: ICD-10-CM

## 2023-03-13 RX ORDER — LOSARTAN POTASSIUM AND HYDROCHLOROTHIAZIDE 25; 100 MG/1; MG/1
1 TABLET ORAL DAILY
Qty: 90 TABLET | Refills: 3 | Status: SHIPPED | OUTPATIENT
Start: 2023-03-13 | End: 2023-03-14

## 2023-03-14 ENCOUNTER — OFFICE VISIT (OUTPATIENT)
Dept: CARDIOLOGY | Facility: MEDICAL CENTER | Age: 63
End: 2023-03-14
Payer: COMMERCIAL

## 2023-03-14 VITALS
WEIGHT: 192.8 LBS | HEART RATE: 75 BPM | OXYGEN SATURATION: 96 % | HEIGHT: 64 IN | DIASTOLIC BLOOD PRESSURE: 80 MMHG | SYSTOLIC BLOOD PRESSURE: 144 MMHG | RESPIRATION RATE: 17 BRPM | BODY MASS INDEX: 32.91 KG/M2

## 2023-03-14 DIAGNOSIS — Z95.5 S/P PRIMARY ANGIOPLASTY WITH CORONARY STENT: ICD-10-CM

## 2023-03-14 DIAGNOSIS — E78.2 MIXED HYPERLIPIDEMIA: ICD-10-CM

## 2023-03-14 DIAGNOSIS — I25.118 CORONARY ARTERY DISEASE OF NATIVE ARTERY OF NATIVE HEART WITH STABLE ANGINA PECTORIS (HCC): ICD-10-CM

## 2023-03-14 DIAGNOSIS — R06.09 DYSPNEA ON EXERTION: ICD-10-CM

## 2023-03-14 DIAGNOSIS — I50.20 ACC/AHA STAGE C SYSTOLIC HEART FAILURE (HCC): ICD-10-CM

## 2023-03-14 DIAGNOSIS — E11.9 TYPE 2 DIABETES MELLITUS WITHOUT COMPLICATION, WITHOUT LONG-TERM CURRENT USE OF INSULIN (HCC): ICD-10-CM

## 2023-03-14 DIAGNOSIS — Z79.899 HIGH RISK MEDICATION USE: ICD-10-CM

## 2023-03-14 DIAGNOSIS — I51.9 ORGANIC HEART DISEASE, NYHA CLASS 2: ICD-10-CM

## 2023-03-14 DIAGNOSIS — I10 HTN (HYPERTENSION), MALIGNANT: ICD-10-CM

## 2023-03-14 PROCEDURE — 99214 OFFICE O/P EST MOD 30 MIN: CPT | Performed by: INTERNAL MEDICINE

## 2023-03-14 RX ORDER — SACUBITRIL AND VALSARTAN 97; 103 MG/1; MG/1
1 TABLET, FILM COATED ORAL 2 TIMES DAILY
Qty: 180 TABLET | Refills: 3 | Status: SHIPPED | OUTPATIENT
Start: 2023-03-14

## 2023-03-14 ASSESSMENT — ENCOUNTER SYMPTOMS
CLAUDICATION: 0
FALLS: 0
COUGH: 0
BRUISES/BLEEDS EASILY: 0
DIZZINESS: 0
EYE REDNESS: 0
SHORTNESS OF BREATH: 1
PND: 0
SORE THROAT: 0
PALPITATIONS: 0
ORTHOPNEA: 0
DEPRESSION: 0
BLOOD IN STOOL: 0

## 2023-03-14 ASSESSMENT — FIBROSIS 4 INDEX: FIB4 SCORE: 1.26

## 2023-03-14 NOTE — PROGRESS NOTES
Chief Complaint   Patient presents with    Congestive Heart Failure     F/V DX: ACC/AHA stage C systolic heart failure (HCC)       Subjective:   Cheikh Chacko is a 62 y.o. male who presents today for cardiac care and evaluation due to abnormal EKG, HTN, established CAD s/p coronary stents in 02/2020.    LVEF of 45 % with global hypokinesis. No significant valvular disease. I have independently interpreted and reviewed echocardiogram's actual images.     Negative PET scan stress test.    Insurance did not approve Jardiance.    Patient does get winded upon walking up inclines or for distance. No symptoms at rest or with daily living activities.    I have independently interpreted and reviewed blood tests results with patient in clinic which shows normal LDL level 49, triglycerides 153, renal and liver function. NT pro BNP of 44.      Past Medical History:   Diagnosis Date    Hyperlipidemia     Hypertension     Sleep apnea      Past Surgical History:   Procedure Laterality Date    DENTAL SURGERY      STENT PLACEMENT      CARDIAC     Family History   Problem Relation Age of Onset    Diabetes Sister     Diabetes Brother     Cancer Neg Hx     Heart Disease Neg Hx     Stroke Neg Hx     Alcohol/Drug Neg Hx      Social History     Socioeconomic History    Marital status:      Spouse name: Not on file    Number of children: Not on file    Years of education: Not on file    Highest education level: Not on file   Occupational History    Not on file   Tobacco Use    Smoking status: Former     Packs/day: 1.00     Years: 34.00     Pack years: 34.00     Types: Cigarettes     Quit date: 2/10/2020     Years since quitting: 3.0    Smokeless tobacco: Never   Vaping Use    Vaping Use: Never used   Substance and Sexual Activity    Alcohol use: No    Drug use: No    Sexual activity: Yes     Partners: Female     Comment: two children, and two step children    Other Topics Concern    Not on file   Social History Narrative    Not on  file     Social Determinants of Health     Financial Resource Strain: Not on file   Food Insecurity: Not on file   Transportation Needs: Not on file   Physical Activity: Not on file   Stress: Not on file   Social Connections: Not on file   Intimate Partner Violence: Not on file   Housing Stability: Not on file     No Known Allergies  Outpatient Encounter Medications as of 3/14/2023   Medication Sig Dispense Refill    losartan-hydrochlorothiazide (HYZAAR) 100-25 MG per tablet TAKE 1 TABLET BY MOUTH EVERY DAY 90 Tablet 3    atorvastatin (LIPITOR) 80 MG tablet Take 1 Tablet by mouth every day. 90 Tablet 3    sacubitril-valsartan (ENTRESTO) 49-51 MG Tab Take 1 Tablet by mouth 2 times a day. 60 Tablet 11    Empagliflozin (JARDIANCE) 10 MG Tab Take 1 Tablet by mouth every day. 90 Tablet 3    spironolactone (ALDACTONE) 25 MG Tab Take 1 Tablet by mouth every day. 90 Tablet 3    cyclobenzaprine (FLEXERIL) 5 mg tablet Take 1 Tablet by mouth 3 times a day as needed for Muscle Spasms. 30 Tablet 1    metFORMIN ER (GLUCOPHAGE XR) 500 MG TABLET SR 24 HR Take 2 Tablets by mouth in the morning and 2 Tablets in the evening. 180 Tablet 3    carvedilol (COREG) 25 MG Tab Take 1 Tablet by mouth 2 times a day with meals. 180 Tablet 3    aspirin (ASA) 81 MG Chew Tab chewable tablet Chew 81 mg every day.       No facility-administered encounter medications on file as of 3/14/2023.     Review of Systems   Constitutional:  Negative for malaise/fatigue.   HENT:  Negative for sore throat.    Eyes:  Negative for redness.   Respiratory:  Positive for shortness of breath. Negative for cough.    Cardiovascular:  Negative for chest pain, palpitations, orthopnea, claudication, leg swelling and PND.   Gastrointestinal:  Negative for blood in stool and melena.   Musculoskeletal:  Negative for falls.   Skin:  Negative for rash.   Neurological:  Negative for dizziness.   Endo/Heme/Allergies:  Does not bruise/bleed easily.   Psychiatric/Behavioral:   "Negative for depression.       Objective:   BP (!) 144/80 (BP Location: Left arm, Patient Position: Sitting, BP Cuff Size: Adult)   Pulse 75   Resp 17   Ht 1.626 m (5' 4\")   Wt 87.5 kg (192 lb 12.8 oz)   SpO2 96%   BMI 33.09 kg/m²     Physical Exam  Vitals and nursing note reviewed.   Constitutional:       General: He is not in acute distress.     Appearance: He is not diaphoretic.   HENT:      Head: Normocephalic and atraumatic.      Right Ear: External ear normal.      Left Ear: External ear normal.      Nose: No congestion or rhinorrhea.   Eyes:      General:         Right eye: No discharge.         Left eye: No discharge.   Neck:      Thyroid: No thyromegaly.      Vascular: No JVD.   Cardiovascular:      Rate and Rhythm: Normal rate and regular rhythm.      Pulses: Normal pulses.   Pulmonary:      Effort: No respiratory distress.   Abdominal:      General: There is no distension.      Tenderness: There is no abdominal tenderness.   Musculoskeletal:         General: No swelling or tenderness.      Right lower leg: No edema.      Left lower leg: No edema.   Skin:     General: Skin is warm and dry.   Neurological:      Mental Status: He is alert and oriented to person, place, and time.      Cranial Nerves: No cranial nerve deficit.   Psychiatric:         Behavior: Behavior normal.       Assessment:     1. ACC/AHA stage C systolic heart failure (HCC)        2. Organic heart disease, NYHA class 2        3. S/P primary angioplasty with coronary stent        4. Coronary artery disease of native artery of native heart with stable angina pectoris (HCC)        5. Mixed hyperlipidemia        6. HTN (hypertension), malignant        7. Type 2 diabetes mellitus without complication, without long-term current use of insulin ([facility])        8. Dyspnea on exertion        9. High risk medication use            Medical Decision Making:  Today's Assessment / Status / Plan:   Coronary arterial disease s/p 2 stents in 02/2020 " (unknown distribution) with cardiomyopathy with LVEF of 45%:    Betablocker as Carvedilol 25 mg po 2x daily.  ASA 81 mg po daily.  Statin as Atorvastatin 80 mg po daily  Will increase Entresto to 97/103 mg BID.    Continue Spironolactone 25 mg daily.    Based on recent data on SGLT2 and heart failure with reduced ejection fraction, patient will be benefited from Jardiance 10 mg p.o. once a day for further reduction in mortality and hospitalization with absolute risk reduction of 5.2%.  Therefore, I will continue patient on Jardiance 10 mg p.o. once a day.  Risks and benefits were explained to patient and patient has agreed to proceed.    Repeat TTE in 4 months.     Hypertension:  Bp is high today.  Will increase Entresto to 97/103 mg BID.  Optimize control with BB, ARNI as permitted above in conjunction with CAD treatment.     Hyperlipidemia:  Optimize statin as within guidelines of CAD treatment as above.    In terms of patient's co-morbiditie such as established cardiovascular disease and type II diabetes mellitus, based on recent trial, patient is indicated to be placed on Empagliflozin for mortality reduction benefit. Based on recent data, such therapy has a relative risk reduction of 38% and absolute risk reduction of 2.2% for cardiovascular death. I will start patient on Jardiance 10 mg once a day. I discussed with patient about potential benefits and risks. Patient understands that this is an adjunct to current regimen of sugar lowering agents and cardiac agents.    Overall, based on prior history of obstructive coronary arterial disease, patient is at at high risk for recurrent events and will require consistent ongoing guidelines directed medical therapy to reduce his cardiovascular mortality and associated complications.    I thank you for referring patient to our Cardiology Clinic today.      Donald Rondon MD.   [facility].  [phone]  [place], [place].

## 2023-03-15 ENCOUNTER — APPOINTMENT (OUTPATIENT)
Dept: CARDIOLOGY | Facility: MEDICAL CENTER | Age: 63
End: 2023-03-15
Attending: INTERNAL MEDICINE
Payer: COMMERCIAL

## 2023-03-29 ENCOUNTER — OFFICE VISIT (OUTPATIENT)
Dept: CARDIOLOGY | Facility: MEDICAL CENTER | Age: 63
End: 2023-03-29
Attending: INTERNAL MEDICINE
Payer: COMMERCIAL

## 2023-03-29 DIAGNOSIS — Z71.89 ENCOUNTER FOR EDUCATION ABOUT HEART FAILURE: ICD-10-CM

## 2023-03-29 PROCEDURE — 99999 PR NO CHARGE: CPT | Performed by: INTERNAL MEDICINE

## 2023-03-29 PROCEDURE — 98960 EDU&TRN PT SELF-MGMT NQHP 1: CPT | Performed by: NURSE PRACTITIONER

## 2023-03-29 NOTE — PROGRESS NOTES
Provided patient with [facility] heart failure packet and a verbal discussion related heart failure with an emphasis on the importance of daily weights, medications, low-sodium diet, and exercise. Patient was positively responsive to education and verbalized understanding and had no questions or concerns.     Discussed sodium consumption versus water. Pt was afraid to drink due to fluid retention. Broke down balance of more water to salt to encourage urination.     Discussed exercise regimen- pt is not very active, simple exercises to start and will gradually increase. Provided all HF education packet contents as well as provided information regarding mediterranean and dash diet for further management.     Patient was provided a scale as he mentioned his wasn't working. Educated to weigh daily    All questions answered.     Total time spent with patient 45min.

## 2023-04-07 ENCOUNTER — OFFICE VISIT (OUTPATIENT)
Dept: MEDICAL GROUP | Facility: MEDICAL CENTER | Age: 63
End: 2023-04-07
Payer: COMMERCIAL

## 2023-04-07 VITALS
TEMPERATURE: 96.6 F | HEIGHT: 64 IN | DIASTOLIC BLOOD PRESSURE: 82 MMHG | WEIGHT: 189.6 LBS | BODY MASS INDEX: 32.37 KG/M2 | RESPIRATION RATE: 16 BRPM | OXYGEN SATURATION: 96 % | SYSTOLIC BLOOD PRESSURE: 150 MMHG | HEART RATE: 66 BPM

## 2023-04-07 DIAGNOSIS — I10 ESSENTIAL HYPERTENSION: ICD-10-CM

## 2023-04-07 DIAGNOSIS — Z87.891 HISTORY OF CIGARETTE SMOKING: ICD-10-CM

## 2023-04-07 DIAGNOSIS — Z23 NEED FOR VACCINATION: ICD-10-CM

## 2023-04-07 DIAGNOSIS — E11.69 DYSLIPIDEMIA ASSOCIATED WITH TYPE 2 DIABETES MELLITUS (HCC): ICD-10-CM

## 2023-04-07 DIAGNOSIS — E11.69 TYPE 2 DIABETES MELLITUS WITH OTHER SPECIFIED COMPLICATION, WITHOUT LONG-TERM CURRENT USE OF INSULIN (HCC): ICD-10-CM

## 2023-04-07 DIAGNOSIS — E78.5 DYSLIPIDEMIA ASSOCIATED WITH TYPE 2 DIABETES MELLITUS (HCC): ICD-10-CM

## 2023-04-07 DIAGNOSIS — E66.9 OBESITY (BMI 30-39.9): ICD-10-CM

## 2023-04-07 PROCEDURE — 90471 IMMUNIZATION ADMIN: CPT | Performed by: FAMILY MEDICINE

## 2023-04-07 PROCEDURE — 92250 FUNDUS PHOTOGRAPHY W/I&R: CPT | Mod: TC | Performed by: FAMILY MEDICINE

## 2023-04-07 PROCEDURE — 99214 OFFICE O/P EST MOD 30 MIN: CPT | Mod: 25 | Performed by: FAMILY MEDICINE

## 2023-04-07 PROCEDURE — 90677 PCV20 VACCINE IM: CPT | Performed by: FAMILY MEDICINE

## 2023-04-07 ASSESSMENT — FIBROSIS 4 INDEX: FIB4 SCORE: 1.28

## 2023-04-07 ASSESSMENT — ENCOUNTER SYMPTOMS
PALPITATIONS: 0
FEVER: 0
CHILLS: 0

## 2023-04-07 ASSESSMENT — PATIENT HEALTH QUESTIONNAIRE - PHQ9: CLINICAL INTERPRETATION OF PHQ2 SCORE: 0

## 2023-04-07 NOTE — PROGRESS NOTES
FAMILY MEDICINE VISIT                                                               Chief complaint::Diagnoses of Type 2 diabetes mellitus with other specified complication, without long-term current use of insulin (HCC), Essential hypertension, Dyslipidemia associated with type 2 diabetes mellitus (HCC), History of tobacco abuse (Quit >6 mos ago), Need for vaccination, and Obesity (BMI 30-39.9) were pertinent to this visit.    History of present illness: Cheikh Chacko is a 63 y.o. male who presented for lab and medication follow-up.    Problem   History of tobacco abuse (Quit >6 mos ago)    He smoked about 1 pack a day for 34 years and quit 3 years ago     Type 2 Diabetes Mellitus With Other Specified Complication (Hcc)    Point-of-care A1c came back at 6.7 which improved from 6.9 and metformin 1000 mg 2 times daily.    Lab Results   Component Value Date/Time    HBA1C 6.9 (H) 07/06/2022 10:25 AM      Monofilament testing with a 10 gram force: sensation intact: intact bilaterally  Visual Inspection: Feet without maceration, ulcers, fissures.  Pedal pulses: intact bilaterally     Dyslipidemia Associated With Type 2 Diabetes Mellitus (Hcc)    He is currently on Lipitor 80 mg daily.  Recent triglyceride numbers came back elevated at 153.  No side effects with medication.    Lab Results   Component Value Date/Time    CHOLSTRLTOT 125 03/09/2023 0838    TRIGLYCERIDE 153 (H) 03/09/2023 0838    HDL 45 03/09/2023 0838    LDL 49 03/09/2023 0838        Essential Hypertension    He is on Coreg 25 mg 2 times daily.  Entresto medication was recently added and  dose was increased.  He has been out of this medication since a month.  He reports that he was waiting for approval.  His blood pressure is elevated today at 150/82.  His home blood pressure readings are also elevated.  No chest pain, palpitations, shortness of breath, lower leg swelling.            Review of systems:     Review of Systems   Constitutional:  Negative for  "chills, fever and malaise/fatigue.   Cardiovascular:  Negative for chest pain, palpitations and leg swelling.      Medications and Allergies:     Current Outpatient Medications   Medication Sig Dispense Refill    sacubitril-valsartan (ENTRESTO)  MG Tab Take 1 Tablet by mouth 2 times a day. 180 Tablet 3    atorvastatin (LIPITOR) 80 MG tablet Take 1 Tablet by mouth every day. 90 Tablet 3    Empagliflozin (JARDIANCE) 10 MG Tab Take 1 Tablet by mouth every day. 90 Tablet 3    spironolactone (ALDACTONE) 25 MG Tab Take 1 Tablet by mouth every day. 90 Tablet 3    cyclobenzaprine (FLEXERIL) 5 mg tablet Take 1 Tablet by mouth 3 times a day as needed for Muscle Spasms. 30 Tablet 1    metFORMIN ER (GLUCOPHAGE XR) 500 MG TABLET SR 24 HR Take 2 Tablets by mouth in the morning and 2 Tablets in the evening. 180 Tablet 3    carvedilol (COREG) 25 MG Tab Take 1 Tablet by mouth 2 times a day with meals. 180 Tablet 3    aspirin (ASA) 81 MG Chew Tab chewable tablet Chew 81 mg every day.       No current facility-administered medications for this visit.          Vitals:    BP (!) 150/82   Pulse 66   Temp 35.9 °C (96.6 °F)   Resp 16   Ht 1.626 m (5' 4\")   Wt 86 kg (189 lb 9.5 oz)   SpO2 96%  Body mass index is 32.54 kg/m².    Physical Exam:     Physical Exam  Constitutional:       Appearance: Normal appearance. He is well-developed and well-groomed.   HENT:      Head: Normocephalic and atraumatic.      Right Ear: External ear normal.      Left Ear: External ear normal.   Eyes:      General:         Right eye: No discharge.         Left eye: No discharge.      Conjunctiva/sclera: Conjunctivae normal.   Cardiovascular:      Rate and Rhythm: Normal rate.   Pulmonary:      Effort: Pulmonary effort is normal. No respiratory distress.   Musculoskeletal:      Cervical back: Neck supple.   Skin:     Findings: No rash.   Neurological:      Mental Status: He is alert.   Psychiatric:         Mood and Affect: Mood and affect normal.       "   Behavior: Behavior normal.        Labs:  I reviewed with patient recent labs resulted on 3/9/2023.    Assessment/Plan:         Problem List Items Addressed This Visit       Type 2 diabetes mellitus with other specified complication (HCC)     Chronic problem, stable, continue metformin 1000 mg 2 times daily and Jardiance 10 mg daily.  Foot exam done today.  Eye exam in office done today.  Repeat labs in 3 months.         Relevant Orders    POCT  A1C    POCT Retinal Eye Exam    Diabetic Monofilament LE Exam (Completed)    Obesity (BMI 30-39.9)    Relevant Orders    Patient identified as having weight management issue.  Appropriate orders and counseling given.    History of tobacco abuse (Quit >6 mos ago)     Continue to refrain from smoking, referral to lung cancer screening program         Relevant Orders    CT-LUNG CANCER-SCREENING    Essential hypertension     Chronic problem, unstable, recommended to go to pharmacy and pick Entresto prescription today and start taking Entresto 1 tablet 2 times daily and continue Coreg 25 mg 2 times daily.  Monitor blood pressure at home and follow-up in 6 weeks for blood pressure follow-up.  Goal blood pressure is less than 130/90 due to comorbid conditions including diabetes, STEMI, s/p angioplasty         Dyslipidemia associated with type 2 diabetes mellitus (HCC)     Chronic problem, very mildly elevated triglyceride level, counseled regarding eating healthy diet and exercise.  Continue Lipitor 80 mg daily.          Other Visit Diagnoses       Need for vaccination        Relevant Orders    Pneumococcal Conjugate Vaccine 20-Valent (19 yrs+) (Completed)             Please note that this dictation was created using voice recognition software. I have made every reasonable attempt to correct obvious errors, but I expect that there are errors of grammar and possibly content that I did not discover before finalizing the note.    Follow up in 6 weeks for blood pressure follow-up in 3  months for lab follow-up.

## 2023-04-07 NOTE — ASSESSMENT & PLAN NOTE
Chronic problem, very mildly elevated triglyceride level, counseled regarding eating healthy diet and exercise.  Continue Lipitor 80 mg daily.

## 2023-04-10 LAB — RETINAL SCREEN: NEGATIVE

## 2023-04-18 ENCOUNTER — TELEPHONE (OUTPATIENT)
Dept: MEDICAL GROUP | Facility: MEDICAL CENTER | Age: 63
End: 2023-04-18
Payer: COMMERCIAL

## 2023-04-18 DIAGNOSIS — Z87.891 HISTORY OF CIGARETTE SMOKING: ICD-10-CM

## 2023-04-18 NOTE — TELEPHONE ENCOUNTER
----- Message from Angeli Hope sent at 4/18/2023  3:32 PM PDT -----  Regarding: referral  Good afternoon Dr. Cedeño,     We have received an order for Cheikh Chacko for CT-LUNG CANCER-SCREENING however, we have not yet received a referral for this exam if you can please update referral so we may contact patient to schedule. Thank you for your time have a great day.    [facility]    Angeli Hope

## 2023-04-24 DIAGNOSIS — I10 ESSENTIAL HYPERTENSION: ICD-10-CM

## 2023-04-24 DIAGNOSIS — I21.09 ST ELEVATION (STEMI) MYOCARDIAL INFARCTION INVOLVING OTHER CORONARY ARTERY OF ANTERIOR WALL (HCC): ICD-10-CM

## 2023-04-24 DIAGNOSIS — Z95.5 S/P PRIMARY ANGIOPLASTY WITH CORONARY STENT: ICD-10-CM

## 2023-04-24 RX ORDER — CARVEDILOL 25 MG/1
TABLET ORAL
Qty: 180 TABLET | Refills: 3 | Status: SHIPPED | OUTPATIENT
Start: 2023-04-24

## 2023-04-24 NOTE — TELEPHONE ENCOUNTER
Is the patient due for a refill? Yes    Was the patient seen the past year? Yes    Date of last office visit: 3/14/2023    Does the patient have an upcoming appointment?  Yes   If yes, When? 7/18/2023    Provider to refill:TT    Does the patients insurance require a 100 day supply?  No

## 2023-05-08 NOTE — PROGRESS NOTES
Subjective     Cheikh Chacko is a 63 y.o. male who presents with Lung Cancer Screening Program Prescreen  Nicotine dependence      HPI  Patient seen today for initial lung cancer screening visit. Patient referred by his PCP, Dr. Davidson Cervantes.     The patient meets eligibility criteria including age, smoking history (20+ pack years), if former smoker, quit in the last 15 years, and absence of signs or symptoms of lung cancer.    - Age - 63  - Smoking history - Patient has smoked for 34 years at an average of 1 ppd = 34 pack year smoking history.  - Current smoking status - former smoker, quit in 2020  - No symptoms of lung cancer and no previous history of lung cancer     No Known Allergies    Current Outpatient Medications on File Prior to Visit   Medication Sig Dispense Refill    carvedilol (COREG) 25 MG Tab TAKE 1 TABLET BY MOUTH TWICE A DAY WITH MEALS 180 Tablet 3    sacubitril-valsartan (ENTRESTO)  MG Tab Take 1 Tablet by mouth 2 times a day. 180 Tablet 3    atorvastatin (LIPITOR) 80 MG tablet Take 1 Tablet by mouth every day. 90 Tablet 3    Empagliflozin (JARDIANCE) 10 MG Tab Take 1 Tablet by mouth every day. 90 Tablet 3    spironolactone (ALDACTONE) 25 MG Tab Take 1 Tablet by mouth every day. 90 Tablet 3    cyclobenzaprine (FLEXERIL) 5 mg tablet Take 1 Tablet by mouth 3 times a day as needed for Muscle Spasms. 30 Tablet 1    metFORMIN ER (GLUCOPHAGE XR) 500 MG TABLET SR 24 HR Take 2 Tablets by mouth in the morning and 2 Tablets in the evening. 180 Tablet 3    aspirin (ASA) 81 MG Chew Tab chewable tablet Chew 81 mg every day.       No current facility-administered medications on file prior to visit.       Review of Systems   Constitutional:  Negative for chills, fever and weight loss.   Respiratory:  Negative for cough, hemoptysis, sputum production, shortness of breath and wheezing.    Cardiovascular:  Negative for chest pain and palpitations.        Objective     /80 (BP Location: Left  "arm, Patient Position: Sitting, BP Cuff Size: Adult)   Pulse 83   Resp 16   Ht 1.6 m (5' 3\")   Wt 84.4 kg (186 lb)   SpO2 95%   BMI 32.95 kg/m²      Physical Exam  Constitutional:       Appearance: Normal appearance.   Cardiovascular:      Rate and Rhythm: Normal rate and regular rhythm.   Pulmonary:      Effort: Pulmonary effort is normal.      Breath sounds: Normal breath sounds.   Musculoskeletal:         General: No swelling.   Neurological:      Mental Status: He is alert.                   Assessment & Plan        1. Personal history of tobacco use, presenting hazards to health  CT-LUNG CANCER-SCREENING               We conducted a shared decision-making process using a decision aid. We reviewed benefits and harms of screening, including false positives and potential need for additional diagnostic testing, the possibility of over diagnosis, and total radiation exposure.    We discussed the importance of adhering to annual LDCT screening. We also discussed the impact of comorbities on the patient's the ability or willingness to undergo diagnostic procedure(s) and treatment.    Counseling on the importance of maintaining cigarette smoking abstinence if former smoker; or the importance of smoking cessation if current smoker and, if appropriate, furnishing of information about tobacco cessation interventions.    Based on our discussion, we have decided to begin annual lung cancer screening starting now.    No follow-up needed unless imaging is abnormal           "

## 2023-05-10 ENCOUNTER — OFFICE VISIT (OUTPATIENT)
Dept: MEDICAL GROUP | Facility: MEDICAL CENTER | Age: 63
End: 2023-05-10
Payer: COMMERCIAL

## 2023-05-10 ENCOUNTER — OFFICE VISIT (OUTPATIENT)
Dept: SLEEP MEDICINE | Facility: MEDICAL CENTER | Age: 63
End: 2023-05-10
Attending: FAMILY MEDICINE
Payer: COMMERCIAL

## 2023-05-10 ENCOUNTER — HOSPITAL ENCOUNTER (OUTPATIENT)
Facility: MEDICAL CENTER | Age: 63
End: 2023-05-10
Attending: FAMILY MEDICINE
Payer: COMMERCIAL

## 2023-05-10 VITALS
WEIGHT: 185.19 LBS | HEART RATE: 67 BPM | TEMPERATURE: 97 F | HEIGHT: 64 IN | OXYGEN SATURATION: 97 % | SYSTOLIC BLOOD PRESSURE: 116 MMHG | BODY MASS INDEX: 31.62 KG/M2 | RESPIRATION RATE: 16 BRPM | DIASTOLIC BLOOD PRESSURE: 66 MMHG

## 2023-05-10 VITALS
DIASTOLIC BLOOD PRESSURE: 80 MMHG | HEART RATE: 83 BPM | OXYGEN SATURATION: 95 % | WEIGHT: 186 LBS | HEIGHT: 63 IN | BODY MASS INDEX: 32.96 KG/M2 | SYSTOLIC BLOOD PRESSURE: 124 MMHG | RESPIRATION RATE: 16 BRPM

## 2023-05-10 DIAGNOSIS — Z11.4 SCREENING FOR HIV (HUMAN IMMUNODEFICIENCY VIRUS): ICD-10-CM

## 2023-05-10 DIAGNOSIS — E11.69 DYSLIPIDEMIA ASSOCIATED WITH TYPE 2 DIABETES MELLITUS (HCC): ICD-10-CM

## 2023-05-10 DIAGNOSIS — I10 ESSENTIAL HYPERTENSION: ICD-10-CM

## 2023-05-10 DIAGNOSIS — E11.69 TYPE 2 DIABETES MELLITUS WITH OTHER SPECIFIED COMPLICATION, WITHOUT LONG-TERM CURRENT USE OF INSULIN (HCC): ICD-10-CM

## 2023-05-10 DIAGNOSIS — E78.5 DYSLIPIDEMIA ASSOCIATED WITH TYPE 2 DIABETES MELLITUS (HCC): ICD-10-CM

## 2023-05-10 DIAGNOSIS — Z87.891 PERSONAL HISTORY OF TOBACCO USE, PRESENTING HAZARDS TO HEALTH: ICD-10-CM

## 2023-05-10 PROCEDURE — 99214 OFFICE O/P EST MOD 30 MIN: CPT | Performed by: FAMILY MEDICINE

## 2023-05-10 PROCEDURE — G0296 VISIT TO DETERM LDCT ELIG: HCPCS | Performed by: FAMILY MEDICINE

## 2023-05-10 PROCEDURE — 82570 ASSAY OF URINE CREATININE: CPT

## 2023-05-10 PROCEDURE — 82043 UR ALBUMIN QUANTITATIVE: CPT

## 2023-05-10 ASSESSMENT — ENCOUNTER SYMPTOMS
WHEEZING: 0
CHILLS: 0
HEMOPTYSIS: 0
FEVER: 0
CHILLS: 0
PALPITATIONS: 0
FEVER: 0
SPUTUM PRODUCTION: 0
SHORTNESS OF BREATH: 0
PALPITATIONS: 0
WEIGHT LOSS: 0
COUGH: 0

## 2023-05-10 ASSESSMENT — FIBROSIS 4 INDEX
FIB4 SCORE: 1.28
FIB4 SCORE: 1.28

## 2023-05-10 NOTE — ASSESSMENT & PLAN NOTE
Chronic problem, stable, continue metformin 1000 mg 2 times daily, and Jardiance 25 mg daily.  Recheck labs in July.

## 2023-05-10 NOTE — Clinical Note
Thank you for referring Katie to the Lung Cancer Screening program.  I enrolled him today. I will update you re: abnormal findings. -Dr. Paola Rojas

## 2023-05-10 NOTE — PROGRESS NOTES
FAMILY MEDICINE VISIT                                                               Chief complaint::Diagnoses of Essential hypertension, Type 2 diabetes mellitus with other specified complication, without long-term current use of insulin (HCC), Dyslipidemia associated with type 2 diabetes mellitus (HCC), and Screening for HIV (human immunodeficiency virus) were pertinent to this visit.    History of present illness: Cheikh Chacko is a 63 y.o. male who presented for blood pressure follow-up.    Problem   Type 2 Diabetes Mellitus With Other Specified Complication (Hcc)    Point-of-care A1c came back at 6.7 which improved from 6.9.  He is on Jardiance 25 mg daily and metformin 1000 mg 2 times daily.    Lab Results   Component Value Date/Time    HBA1C 6.9 (H) 07/06/2022 10:25 AM           Dyslipidemia Associated With Type 2 Diabetes Mellitus (Hcc)    He is currently on Lipitor 80 mg daily.  Recent triglyceride numbers came back elevated at 153.  No side effects with medication.    Lab Results   Component Value Date/Time    CHOLSTRLTOT 125 03/09/2023 0838    TRIGLYCERIDE 153 (H) 03/09/2023 0838    HDL 45 03/09/2023 0838    LDL 49 03/09/2023 0838        Essential Hypertension    He reports that he got Entresto medication and he has been taking 1 tablet 2 times daily, Coreg 25 mg 2 times daily and spironolactone 25 mg daily.  Blood pressure today came back at 116/66.  Reports home blood pressure readings are much higher.  He is not sure if his home blood pressure machine is giving him correct readings.  No chest pain, palpitations, shortness of breath, lower leg swelling.            Review of systems:     Review of Systems   Constitutional:  Negative for chills, fever and malaise/fatigue.   Cardiovascular:  Negative for chest pain, palpitations and leg swelling.        Medications and Allergies:     Current Outpatient Medications   Medication Sig Dispense Refill    carvedilol (COREG) 25 MG Tab TAKE 1 TABLET BY MOUTH  "TWICE A DAY WITH MEALS 180 Tablet 3    sacubitril-valsartan (ENTRESTO)  MG Tab Take 1 Tablet by mouth 2 times a day. 180 Tablet 3    atorvastatin (LIPITOR) 80 MG tablet Take 1 Tablet by mouth every day. 90 Tablet 3    Empagliflozin (JARDIANCE) 10 MG Tab Take 1 Tablet by mouth every day. 90 Tablet 3    spironolactone (ALDACTONE) 25 MG Tab Take 1 Tablet by mouth every day. 90 Tablet 3    cyclobenzaprine (FLEXERIL) 5 mg tablet Take 1 Tablet by mouth 3 times a day as needed for Muscle Spasms. 30 Tablet 1    metFORMIN ER (GLUCOPHAGE XR) 500 MG TABLET SR 24 HR Take 2 Tablets by mouth in the morning and 2 Tablets in the evening. 180 Tablet 3    aspirin (ASA) 81 MG Chew Tab chewable tablet Chew 81 mg every day.       No current facility-administered medications for this visit.          Vitals:    /66   Pulse 67   Temp 36.1 °C (97 °F)   Resp 16   Ht 1.626 m (5' 4\")   Wt 84 kg (185 lb 3 oz)   SpO2 97%  Body mass index is 31.79 kg/m².    Physical Exam:     Physical Exam  Constitutional:       Appearance: Normal appearance. He is well-developed and well-groomed.   HENT:      Head: Normocephalic and atraumatic.      Right Ear: External ear normal.      Left Ear: External ear normal.   Eyes:      General:         Right eye: No discharge.         Left eye: No discharge.      Conjunctiva/sclera: Conjunctivae normal.   Cardiovascular:      Rate and Rhythm: Normal rate.   Pulmonary:      Effort: Pulmonary effort is normal. No respiratory distress.   Musculoskeletal:      Cervical back: Neck supple.   Skin:     Findings: No rash.   Neurological:      Mental Status: He is alert.   Psychiatric:         Mood and Affect: Mood and affect normal.         Behavior: Behavior normal.            Assessment/Plan:         Problem List Items Addressed This Visit       Type 2 diabetes mellitus with other specified complication (HCC)     Chronic problem, stable, continue metformin 1000 mg 2 times daily, and Jardiance 25 mg daily.  " Recheck labs in July.           Relevant Orders    Comp Metabolic Panel    HEMOGLOBIN A1C    VITAMIN B12    MICROALBUMIN CREAT RATIO URINE    Essential hypertension     Chronic problem, stable, continue same meds as listed in HPI.  Recommended to bring blood pressure machine at next visit.           Dyslipidemia associated with type 2 diabetes mellitus (HCC)     Chronic problem, mildly elevated triglyceride level, eat healthy diet and exercise.  Recheck labs in July           Relevant Orders    Lipid Profile     Other Visit Diagnoses       Screening for HIV (human immunodeficiency virus)        Relevant Orders    HIV AG/AB COMBO ASSAY SCREENING             Please note that this dictation was created using voice recognition software. I have made every reasonable attempt to correct obvious errors, but I expect that there are errors of grammar and possibly content that I did not discover before finalizing the note.    Follow up in July 2023 for lab follow up.

## 2023-05-10 NOTE — ASSESSMENT & PLAN NOTE
Chronic problem, stable, continue same meds as listed in HPI.  Recommended to bring blood pressure machine at next visit.

## 2023-05-10 NOTE — ASSESSMENT & PLAN NOTE
Chronic problem, mildly elevated triglyceride level, eat healthy diet and exercise.  Recheck labs in July

## 2023-05-11 LAB
CREAT UR-MCNC: 70.55 MG/DL
MICROALBUMIN UR-MCNC: <1.2 MG/DL
MICROALBUMIN/CREAT UR: NORMAL MG/G (ref 0–30)

## 2023-05-19 ENCOUNTER — HOSPITAL ENCOUNTER (OUTPATIENT)
Dept: CARDIOLOGY | Facility: MEDICAL CENTER | Age: 63
End: 2023-05-19
Attending: INTERNAL MEDICINE
Payer: COMMERCIAL

## 2023-05-19 DIAGNOSIS — R06.09 DYSPNEA ON EXERTION: ICD-10-CM

## 2023-05-19 DIAGNOSIS — I50.20 ACC/AHA STAGE C SYSTOLIC HEART FAILURE (HCC): ICD-10-CM

## 2023-05-19 DIAGNOSIS — I51.9 ORGANIC HEART DISEASE, NYHA CLASS 2: ICD-10-CM

## 2023-05-19 PROCEDURE — 700117 HCHG RX CONTRAST REV CODE 255: Performed by: INTERNAL MEDICINE

## 2023-05-19 PROCEDURE — 93325 DOPPLER ECHO COLOR FLOW MAPG: CPT

## 2023-05-19 RX ADMIN — HUMAN ALBUMIN MICROSPHERES AND PERFLUTREN 3 ML: 10; .22 INJECTION, SOLUTION INTRAVENOUS at 10:08

## 2023-05-22 LAB
LV EJECT FRACT  99904: 45
LV EJECT FRACT MOD 2C 99903: 50.94
LV EJECT FRACT MOD 4C 99902: 49.24
LV EJECT FRACT MOD BP 99901: 48.11

## 2023-05-22 PROCEDURE — 93321 DOPPLER ECHO F-UP/LMTD STD: CPT | Mod: 26 | Performed by: INTERNAL MEDICINE

## 2023-05-22 PROCEDURE — 93308 TTE F-UP OR LMTD: CPT | Mod: 26 | Performed by: INTERNAL MEDICINE

## 2023-05-22 PROCEDURE — 93325 DOPPLER ECHO COLOR FLOW MAPG: CPT | Mod: 26 | Performed by: INTERNAL MEDICINE

## 2023-07-18 ENCOUNTER — APPOINTMENT (OUTPATIENT)
Dept: CARDIOLOGY | Facility: MEDICAL CENTER | Age: 63
End: 2023-07-18
Attending: INTERNAL MEDICINE
Payer: COMMERCIAL

## 2023-07-27 ENCOUNTER — APPOINTMENT (OUTPATIENT)
Dept: MEDICAL GROUP | Facility: MEDICAL CENTER | Age: 63
End: 2023-07-27
Payer: COMMERCIAL

## 2024-01-03 ENCOUNTER — TELEPHONE (OUTPATIENT)
Dept: PHARMACY | Facility: MEDICAL CENTER | Age: 64
End: 2024-01-03
Payer: COMMERCIAL

## 2024-01-03 DIAGNOSIS — I50.20 ACC/AHA STAGE C SYSTOLIC HEART FAILURE (HCC): ICD-10-CM

## 2024-01-03 RX ORDER — EMPAGLIFLOZIN 10 MG/1
10 TABLET, FILM COATED ORAL DAILY
Qty: 90 TABLET | Refills: 0 | Status: SHIPPED | OUTPATIENT
Start: 2024-01-03

## 2024-01-03 NOTE — TELEPHONE ENCOUNTER
Back to back call Attempts  to contact patient at [phone] to discuss [facility] and services/benefits offered. No answer, left voicemail.     Elena Hickey  Rx Coordinator   [phone]

## 2024-01-04 NOTE — TELEPHONE ENCOUNTER
Attempted to contact patient at [phone] to discuss [facility] and services/benefits offered. No answer, left voicemail.     Elena Hickey  Rx Coordinator   [phone]

## 2024-01-10 NOTE — ASSESSMENT & PLAN NOTE
BP elevated. I did recheck Bp and it was 160/90. He is on lisinopril which is causing him dry cough, but he is taking medication. He denies chest pain,sob, leg swelling, palpitation, blurred vision, lightheadedness, headache.   
Counseling for a small portion, balanced diet, exercising for3-5 times per week.    
He is trying to cut on smoking. Counseling provided   
His wife reports loud snoring, and sometimes he stop breathing. He feels tired in the morning, easily naps during the day. His wife is concerned that he has JOSE. Mallampati score 3   
Slight better. Patches with hair loss. No erythema. Some hair is growing again. There is some white dry skin as, dandruff. I will try ketoconazole shampoo for trial. TSH normal    
Nephrology

## 2024-02-28 DIAGNOSIS — I50.20 ACC/AHA STAGE C SYSTOLIC HEART FAILURE (HCC): ICD-10-CM

## 2024-02-29 RX ORDER — SPIRONOLACTONE 25 MG/1
25 TABLET ORAL DAILY
Qty: 90 TABLET | Refills: 0 | Status: SHIPPED | OUTPATIENT
Start: 2024-02-29

## 2024-04-03 DIAGNOSIS — I50.20 ACC/AHA STAGE C SYSTOLIC HEART FAILURE (HCC): ICD-10-CM

## 2024-04-03 NOTE — TELEPHONE ENCOUNTER
Is the patient due for a refill? Yes    Was the patient seen the past year? No    Date of last office visit: 03.14.2023    Does the patient have an upcoming appointment?  No    Provider to refill:TT    Does the patients insurance require a 100 day supply?  No

## 2024-04-08 RX ORDER — EMPAGLIFLOZIN 10 MG/1
10 TABLET, FILM COATED ORAL DAILY
Qty: 30 TABLET | Refills: 0 | Status: SHIPPED | OUTPATIENT
Start: 2024-04-08

## 2024-05-22 ENCOUNTER — HOSPITAL ENCOUNTER (OUTPATIENT)
Facility: MEDICAL CENTER | Age: 64
End: 2024-05-22
Attending: FAMILY MEDICINE
Payer: COMMERCIAL

## 2024-05-22 ENCOUNTER — OFFICE VISIT (OUTPATIENT)
Dept: MEDICAL GROUP | Facility: MEDICAL CENTER | Age: 64
End: 2024-05-22
Payer: COMMERCIAL

## 2024-05-22 VITALS
SYSTOLIC BLOOD PRESSURE: 132 MMHG | DIASTOLIC BLOOD PRESSURE: 68 MMHG | HEIGHT: 63 IN | BODY MASS INDEX: 33.2 KG/M2 | HEART RATE: 83 BPM | OXYGEN SATURATION: 95 % | WEIGHT: 187.39 LBS | TEMPERATURE: 97.1 F | RESPIRATION RATE: 17 BRPM

## 2024-05-22 DIAGNOSIS — E66.9 OBESITY (BMI 30-39.9): ICD-10-CM

## 2024-05-22 DIAGNOSIS — E78.5 DYSLIPIDEMIA ASSOCIATED WITH TYPE 2 DIABETES MELLITUS (HCC): ICD-10-CM

## 2024-05-22 DIAGNOSIS — I10 ESSENTIAL HYPERTENSION: ICD-10-CM

## 2024-05-22 DIAGNOSIS — E11.69 DYSLIPIDEMIA ASSOCIATED WITH TYPE 2 DIABETES MELLITUS (HCC): ICD-10-CM

## 2024-05-22 DIAGNOSIS — Z87.891 HISTORY OF CIGARETTE SMOKING: ICD-10-CM

## 2024-05-22 DIAGNOSIS — E11.69 TYPE 2 DIABETES MELLITUS WITH OTHER SPECIFIED COMPLICATION, WITHOUT LONG-TERM CURRENT USE OF INSULIN (HCC): ICD-10-CM

## 2024-05-22 PROBLEM — R10.9 ACUTE RIGHT FLANK PAIN: Status: RESOLVED | Noted: 2022-10-20 | Resolved: 2024-05-22

## 2024-05-22 LAB
HBA1C MFR BLD: 6.7 % (ref ?–5.8)
POCT INT CON NEG: NEGATIVE
POCT INT CON POS: POSITIVE

## 2024-05-22 PROCEDURE — 83036 HEMOGLOBIN GLYCOSYLATED A1C: CPT | Performed by: FAMILY MEDICINE

## 2024-05-22 PROCEDURE — 3078F DIAST BP <80 MM HG: CPT | Performed by: FAMILY MEDICINE

## 2024-05-22 PROCEDURE — 92250 FUNDUS PHOTOGRAPHY W/I&R: CPT | Mod: 26 | Performed by: FAMILY MEDICINE

## 2024-05-22 PROCEDURE — 99214 OFFICE O/P EST MOD 30 MIN: CPT | Performed by: FAMILY MEDICINE

## 2024-05-22 PROCEDURE — 3075F SYST BP GE 130 - 139MM HG: CPT | Performed by: FAMILY MEDICINE

## 2024-05-22 ASSESSMENT — ENCOUNTER SYMPTOMS
CHILLS: 0
PALPITATIONS: 0
FEVER: 0

## 2024-05-22 ASSESSMENT — PATIENT HEALTH QUESTIONNAIRE - PHQ9: CLINICAL INTERPRETATION OF PHQ2 SCORE: 0

## 2024-05-22 NOTE — ASSESSMENT & PLAN NOTE
Chronic condition, stable, LDL is at goal triglyceride is mildly elevated, continue to work on diet and exercise, continue Lipitor 80 mg daily.  Recheck labs

## 2024-05-22 NOTE — PROGRESS NOTES
FAMILY MEDICINE VISIT                                                               Assessment/Plan:         Problem List Items Addressed This Visit       Essential hypertension     Chronic condition, stable, continue Entresto, Coreg and spironolactone at same dose         Obesity (BMI 30-39.9)     Chronic condition unstable, counseled to eat healthy diet and exercise for weight loss         Relevant Orders    Patient identified as having weight management issue.  Appropriate orders and counseling given.    Type 2 diabetes mellitus with other specified complication (HCC)     Chronic condition, stable, continue Jardiance 10 mg daily.  Recheck labs, foot exam done today and eye exam done today.         Relevant Orders    POCT A1C (Completed)    POCT Retinal Eye Exam    Microalbumin Creat Ratio Urine - Clinic Collect    Diabetic Monofilament Lower Extremity Exam (Completed)    Comp Metabolic Panel    VITAMIN B12    TSH+FREE T4    Dyslipidemia associated with type 2 diabetes mellitus (HCC)     Chronic condition, stable, LDL is at goal triglyceride is mildly elevated, continue to work on diet and exercise, continue Lipitor 80 mg daily.  Recheck labs         Relevant Orders    Lipid Profile    History of tobacco abuse (Quit >6 mos ago)    Relevant Orders    REFERRAL TO LUNG CANCER SCREENING PROGRAM          Follow up in 1 month for medication follow-up.      Chief complaint::Diagnoses of Type 2 diabetes mellitus with other specified complication, without long-term current use of insulin (HCC), History of tobacco abuse (Quit >6 mos ago), Essential hypertension, Obesity (BMI 30-39.9), and Dyslipidemia associated with type 2 diabetes mellitus (HCC) were pertinent to this visit.    History of present illness: Cheikh Chacko is a 64 y.o. male who presented for diabetes follow-up.    Problem   History of tobacco abuse (Quit >6 mos ago)    He smoked about 1 pack a day for 34 years and quit smoking in 2020, meets criteria,  referral to lung cancer screening program     Type 2 Diabetes Mellitus With Other Specified Complication (Hcc)    Point-of-care A1c came back at 6.7.  He is on Jardiance 10 mg daily.  Stop metformin due to side effects    Lab Results   Component Value Date/Time    HBA1C 6.7 (A) 05/22/2024 03:11 PM      Monofilament testing with a 10 gram force: sensation intact: intact bilaterally  Visual Inspection: Feet without maceration, ulcers, fissures.  Pedal pulses: intact bilaterally      Dyslipidemia Associated With Type 2 Diabetes Mellitus (Hcc)    He is currently on Lipitor 80 mg daily.  Recent triglyceride numbers came back elevated at 153.  No side effects with medication.    Lab Results   Component Value Date/Time    CHOLSTRLTOT 125 03/09/2023 0838    TRIGLYCERIDE 153 (H) 03/09/2023 0838    HDL 45 03/09/2023 0838    LDL 49 03/09/2023 0838           Essential Hypertension    He is on Entresto medication 1 tablet 2 times daily, Coreg 25 mg 2 times daily and spironolactone 25 mg daily.  Blood pressure today came back at 132/68.   No chest pain, palpitations, shortness of breath, lower leg swelling.     Obesity (Bmi 30-39.9)    Current BMI at 33.19, weight at 187           Review of systems:     Review of Systems   Constitutional:  Negative for chills and fever.   Cardiovascular:  Negative for chest pain, palpitations and leg swelling.        Medications and Allergies:     Current Outpatient Medications   Medication Sig Dispense Refill    JARDIANCE 10 MG Tab tablet TAKE 1 TABLET BY MOUTH EVERY DAY 30 Tablet 0    spironolactone (ALDACTONE) 25 MG Tab Take 1 Tablet by mouth every day. Please call [phone] and schedule follow up appointment for further refills. Thank you 90 Tablet 0    carvedilol (COREG) 25 MG Tab TAKE 1 TABLET BY MOUTH TWICE A DAY WITH MEALS 180 Tablet 3    sacubitril-valsartan (ENTRESTO)  MG Tab Take 1 Tablet by mouth 2 times a day. 180 Tablet 3    atorvastatin (LIPITOR) 80 MG tablet Take 1 Tablet  "by mouth every day. 90 Tablet 3    cyclobenzaprine (FLEXERIL) 5 mg tablet Take 1 Tablet by mouth 3 times a day as needed for Muscle Spasms. 30 Tablet 1    aspirin (ASA) 81 MG Chew Tab chewable tablet Chew 81 mg every day.       No current facility-administered medications for this visit.          Vitals:    /68   Pulse 83   Temp 36.2 °C (97.1 °F)   Resp 17   Ht 1.6 m (5' 3\")   Wt 85 kg (187 lb 6.3 oz)   SpO2 95%  Body mass index is 33.19 kg/m².    Physical Exam:     Physical Exam  Constitutional:       Appearance: Normal appearance. He is well-developed and well-groomed.   HENT:      Head: Normocephalic and atraumatic.   Eyes:      General:         Right eye: No discharge.         Left eye: No discharge.      Conjunctiva/sclera: Conjunctivae normal.   Cardiovascular:      Rate and Rhythm: Normal rate and regular rhythm.      Heart sounds: Normal heart sounds. No murmur heard.     No friction rub. No gallop.   Pulmonary:      Effort: Pulmonary effort is normal. No respiratory distress.      Breath sounds: Normal breath sounds. No wheezing or rales.   Neurological:      General: No focal deficit present.      Mental Status: He is alert. Mental status is at baseline.      Gait: Gait is intact.   Psychiatric:         Mood and Affect: Mood and affect normal.         Behavior: Behavior normal.                  Please note that this dictation was created using voice recognition software. I have made every reasonable attempt to correct obvious errors, but I expect that there are errors of grammar and possibly content that I did not discover before finalizing the note.      "

## 2024-05-22 NOTE — ASSESSMENT & PLAN NOTE
Chronic condition, stable, continue Jardiance 10 mg daily.  Recheck labs, foot exam done today and eye exam done today.

## 2024-05-23 DIAGNOSIS — E11.69 TYPE 2 DIABETES MELLITUS WITH OTHER SPECIFIED COMPLICATION, WITHOUT LONG-TERM CURRENT USE OF INSULIN (HCC): ICD-10-CM

## 2024-05-23 LAB — RETINAL SCREEN: NEGATIVE

## 2024-05-24 LAB
CREAT UR-MCNC: 185.34 MG/DL
MICROALBUMIN UR-MCNC: 2 MG/DL
MICROALBUMIN/CREAT UR: 11 MG/G (ref 0–30)

## 2024-06-01 DIAGNOSIS — I50.20 ACC/AHA STAGE C SYSTOLIC HEART FAILURE (HCC): ICD-10-CM

## 2024-06-03 DIAGNOSIS — E66.9 OBESITY (BMI 30-39.9): ICD-10-CM

## 2024-06-03 DIAGNOSIS — F17.210 SMOKING GREATER THAN 30 PACK YEARS: ICD-10-CM

## 2024-06-03 RX ORDER — SPIRONOLACTONE 25 MG/1
25 TABLET ORAL DAILY
Qty: 90 TABLET | Refills: 0 | OUTPATIENT
Start: 2024-06-03

## 2024-06-04 DIAGNOSIS — I10 ESSENTIAL HYPERTENSION: ICD-10-CM

## 2024-06-04 DIAGNOSIS — I21.09 ST ELEVATION (STEMI) MYOCARDIAL INFARCTION INVOLVING OTHER CORONARY ARTERY OF ANTERIOR WALL (HCC): ICD-10-CM

## 2024-06-04 DIAGNOSIS — Z95.5 S/P PRIMARY ANGIOPLASTY WITH CORONARY STENT: ICD-10-CM

## 2024-06-04 RX ORDER — CARVEDILOL 25 MG/1
TABLET ORAL
Qty: 180 TABLET | Refills: 3 | OUTPATIENT
Start: 2024-06-04

## 2024-06-20 DIAGNOSIS — I50.20 ACC/AHA STAGE C SYSTOLIC HEART FAILURE (HCC): ICD-10-CM

## 2024-06-20 RX ORDER — EMPAGLIFLOZIN 10 MG/1
10 TABLET, FILM COATED ORAL DAILY
Qty: 60 TABLET | Refills: 0 | Status: SHIPPED | OUTPATIENT
Start: 2024-06-20

## 2024-06-20 NOTE — TELEPHONE ENCOUNTER
Is the patient due for a refill? Yes    Was the patient seen the past year? No    Date of last office visit: 03/14/2023    Does the patient have an upcoming appointment?  Yes   If yes, When? 8/14/2024    Provider to refill:TT    Does the patients insurance require a 100 day supply?  No

## 2024-07-22 ENCOUNTER — HOSPITAL ENCOUNTER (OUTPATIENT)
Dept: LAB | Facility: MEDICAL CENTER | Age: 64
End: 2024-07-22
Attending: FAMILY MEDICINE
Payer: COMMERCIAL

## 2024-07-22 DIAGNOSIS — E11.69 TYPE 2 DIABETES MELLITUS WITH OTHER SPECIFIED COMPLICATION, WITHOUT LONG-TERM CURRENT USE OF INSULIN (HCC): ICD-10-CM

## 2024-07-22 DIAGNOSIS — E78.5 DYSLIPIDEMIA ASSOCIATED WITH TYPE 2 DIABETES MELLITUS (HCC): ICD-10-CM

## 2024-07-22 DIAGNOSIS — E11.69 DYSLIPIDEMIA ASSOCIATED WITH TYPE 2 DIABETES MELLITUS (HCC): ICD-10-CM

## 2024-07-22 LAB
ALBUMIN SERPL BCP-MCNC: 4.3 G/DL (ref 3.2–4.9)
ALBUMIN/GLOB SERPL: 1.5 G/DL
ALP SERPL-CCNC: 48 U/L (ref 30–99)
ALT SERPL-CCNC: 31 U/L (ref 2–50)
ANION GAP SERPL CALC-SCNC: 13 MMOL/L (ref 7–16)
AST SERPL-CCNC: 22 U/L (ref 12–45)
BILIRUB SERPL-MCNC: 0.4 MG/DL (ref 0.1–1.5)
BUN SERPL-MCNC: 18 MG/DL (ref 8–22)
CALCIUM ALBUM COR SERPL-MCNC: 8.5 MG/DL (ref 8.5–10.5)
CALCIUM SERPL-MCNC: 8.7 MG/DL (ref 8.4–10.2)
CHLORIDE SERPL-SCNC: 105 MMOL/L (ref 96–112)
CHOLEST SERPL-MCNC: 172 MG/DL (ref 100–199)
CO2 SERPL-SCNC: 20 MMOL/L (ref 20–33)
CREAT SERPL-MCNC: 0.87 MG/DL (ref 0.5–1.4)
FASTING STATUS PATIENT QL REPORTED: NORMAL
GFR SERPLBLD CREATININE-BSD FMLA CKD-EPI: 95 ML/MIN/1.73 M 2
GLOBULIN SER CALC-MCNC: 2.8 G/DL (ref 1.9–3.5)
GLUCOSE SERPL-MCNC: 119 MG/DL (ref 65–99)
HDLC SERPL-MCNC: 50 MG/DL
LDLC SERPL CALC-MCNC: 99 MG/DL
POTASSIUM SERPL-SCNC: 4.2 MMOL/L (ref 3.6–5.5)
PROT SERPL-MCNC: 7.1 G/DL (ref 6–8.2)
SODIUM SERPL-SCNC: 138 MMOL/L (ref 135–145)
T4 FREE SERPL-MCNC: 1.26 NG/DL (ref 0.93–1.7)
TRIGL SERPL-MCNC: 115 MG/DL (ref 0–149)
TSH SERPL DL<=0.005 MIU/L-ACNC: 4.5 UIU/ML (ref 0.38–5.33)
VIT B12 SERPL-MCNC: 467 PG/ML (ref 211–911)

## 2024-07-22 PROCEDURE — 82607 VITAMIN B-12: CPT

## 2024-07-22 PROCEDURE — 80061 LIPID PANEL: CPT

## 2024-07-22 PROCEDURE — 84439 ASSAY OF FREE THYROXINE: CPT

## 2024-07-22 PROCEDURE — 36415 COLL VENOUS BLD VENIPUNCTURE: CPT

## 2024-07-22 PROCEDURE — 84443 ASSAY THYROID STIM HORMONE: CPT

## 2024-07-22 PROCEDURE — 80053 COMPREHEN METABOLIC PANEL: CPT

## 2024-08-08 ENCOUNTER — APPOINTMENT (OUTPATIENT)
Dept: MEDICAL GROUP | Facility: MEDICAL CENTER | Age: 64
End: 2024-08-08
Payer: COMMERCIAL

## 2024-08-08 VITALS
HEIGHT: 63 IN | SYSTOLIC BLOOD PRESSURE: 122 MMHG | BODY MASS INDEX: 33.95 KG/M2 | WEIGHT: 191.58 LBS | OXYGEN SATURATION: 95 % | DIASTOLIC BLOOD PRESSURE: 68 MMHG | RESPIRATION RATE: 14 BRPM | TEMPERATURE: 97.6 F | HEART RATE: 84 BPM

## 2024-08-08 DIAGNOSIS — E78.5 DYSLIPIDEMIA ASSOCIATED WITH TYPE 2 DIABETES MELLITUS (HCC): ICD-10-CM

## 2024-08-08 DIAGNOSIS — G47.33 OSA (OBSTRUCTIVE SLEEP APNEA): ICD-10-CM

## 2024-08-08 DIAGNOSIS — Z12.5 SCREENING FOR PROSTATE CANCER: ICD-10-CM

## 2024-08-08 DIAGNOSIS — E11.69 TYPE 2 DIABETES MELLITUS WITH OTHER SPECIFIED COMPLICATION, WITHOUT LONG-TERM CURRENT USE OF INSULIN (HCC): ICD-10-CM

## 2024-08-08 DIAGNOSIS — E11.69 DYSLIPIDEMIA ASSOCIATED WITH TYPE 2 DIABETES MELLITUS (HCC): ICD-10-CM

## 2024-08-08 PROCEDURE — 99214 OFFICE O/P EST MOD 30 MIN: CPT | Performed by: FAMILY MEDICINE

## 2024-08-08 PROCEDURE — 3078F DIAST BP <80 MM HG: CPT | Performed by: FAMILY MEDICINE

## 2024-08-08 PROCEDURE — 3074F SYST BP LT 130 MM HG: CPT | Performed by: FAMILY MEDICINE

## 2024-08-08 ASSESSMENT — ENCOUNTER SYMPTOMS
PALPITATIONS: 0
FEVER: 0
CHILLS: 0

## 2024-08-08 NOTE — PROGRESS NOTES
Verbal consent was acquired by the patient to use [employer] ambient listening note generation during this visit.      Cheikh was seen today for lab results and diabetes follow-up.    Diagnoses and all orders for this visit:    Type 2 diabetes mellitus with other specified complication, without long-term current use of insulin (HCC)  -     Comp Metabolic Panel; Future  -     HEMOGLOBIN A1C; Future  -     VITAMIN B12; Future    Dyslipidemia associated with type 2 diabetes mellitus (HCC)  -     Lipid Profile; Future    Screening for prostate cancer  -     PROSTATE SPECIFIC AG SCREENING; Future    JOSE (obstructive sleep apnea)  -     Polysomnography, 4 or More; Future                  Assessment & Plan  1. Type 2 diabetes mellitus.  This is a chronic condition, stable. Continue Jardiance 10 mg daily.  Recommended to start taking B12 supplementation 1 a day.    2. Dyslipidemia.  This is a chronic condition, stable. Triglyceride numbers are getting better. Recheck labs in 4 months. Continue Lipitor 80 mg daily.    3. Obstructive sleep apnea.  This is a chronic condition, unstable. He has history of obstructive sleep apnea, but he was not using sleep apnea machine persistently, so the company took that machine away. We did STOP-Bang score, which came back at 7. Referral placed for getting sleep study done.    Follow-up  The patient will follow up in 4 months.          Chief complaint::Diagnoses of Type 2 diabetes mellitus with other specified complication, without long-term current use of insulin (HCC), Dyslipidemia associated with type 2 diabetes mellitus (HCC), Screening for prostate cancer, and JOSE (obstructive sleep apnea) were pertinent to this visit.      History of Present Illness  The patient is a 64-year-old male here for lab follow-up.    He has discontinued metformin due to side effects.  And is currently only taking Jardiance 10 mg.    He is experiencing symptoms of sleep apnea, including snoring, daytime  "fatigue, and a dry throat.    He is on Entresto for his heart.      Review of Systems   Constitutional:  Negative for chills and fever.   Cardiovascular:  Negative for chest pain, palpitations and leg swelling.          Medications and Allergies:     Current Outpatient Medications   Medication Sig Dispense Refill    Empagliflozin (JARDIANCE) 10 MG Tab tablet Take 1 Tablet by mouth every day. 60 Tablet 0    spironolactone (ALDACTONE) 25 MG Tab Take 1 Tablet by mouth every day. Please call [phone] and schedule follow up appointment for further refills. Thank you 90 Tablet 0    carvedilol (COREG) 25 MG Tab TAKE 1 TABLET BY MOUTH TWICE A DAY WITH MEALS 180 Tablet 3    sacubitril-valsartan (ENTRESTO)  MG Tab Take 1 Tablet by mouth 2 times a day. 180 Tablet 3    atorvastatin (LIPITOR) 80 MG tablet Take 1 Tablet by mouth every day. 90 Tablet 3    aspirin (ASA) 81 MG Chew Tab chewable tablet Chew 81 mg every day.       No current facility-administered medications for this visit.       /68   Pulse 84   Temp 36.4 °C (97.6 °F) (Temporal)   Resp 14   Ht 1.6 m (5' 3\")   Wt 86.9 kg (191 lb 9.3 oz)   SpO2 95% , Body mass index is 33.94 kg/m².      Physical Exam  Constitutional:       Appearance: Normal appearance. He is well-developed and well-groomed.   HENT:      Head: Normocephalic and atraumatic.      Right Ear: External ear normal.      Left Ear: External ear normal.   Eyes:      General:         Right eye: No discharge.         Left eye: No discharge.      Conjunctiva/sclera: Conjunctivae normal.   Cardiovascular:      Rate and Rhythm: Normal rate.   Pulmonary:      Effort: Pulmonary effort is normal. No respiratory distress.   Musculoskeletal:      Cervical back: Neck supple.   Skin:     Findings: No rash.   Neurological:      Mental Status: He is alert.   Psychiatric:         Mood and Affect: Mood and affect normal.         Behavior: Behavior normal.            I reviewed with patient lab results " resulted on 7/22/2024.        Please note that this dictation was created using voice recognition software. I have made every reasonable attempt to correct obvious errors, but I expect that there are errors of grammar and possibly content that I did not discover before finalizing the note.

## 2024-08-14 ENCOUNTER — OFFICE VISIT (OUTPATIENT)
Dept: CARDIOLOGY | Facility: MEDICAL CENTER | Age: 64
End: 2024-08-14
Attending: INTERNAL MEDICINE
Payer: COMMERCIAL

## 2024-08-14 VITALS
HEART RATE: 69 BPM | HEIGHT: 63 IN | RESPIRATION RATE: 16 BRPM | DIASTOLIC BLOOD PRESSURE: 98 MMHG | BODY MASS INDEX: 33.98 KG/M2 | OXYGEN SATURATION: 97 % | WEIGHT: 191.8 LBS | SYSTOLIC BLOOD PRESSURE: 162 MMHG

## 2024-08-14 DIAGNOSIS — Z95.5 S/P PRIMARY ANGIOPLASTY WITH CORONARY STENT: ICD-10-CM

## 2024-08-14 DIAGNOSIS — I51.9 ORGANIC HEART DISEASE, NYHA CLASS 2: ICD-10-CM

## 2024-08-14 DIAGNOSIS — I21.09 ST ELEVATION (STEMI) MYOCARDIAL INFARCTION INVOLVING OTHER CORONARY ARTERY OF ANTERIOR WALL (HCC): ICD-10-CM

## 2024-08-14 DIAGNOSIS — I25.118 CORONARY ARTERY DISEASE OF NATIVE ARTERY OF NATIVE HEART WITH STABLE ANGINA PECTORIS (HCC): ICD-10-CM

## 2024-08-14 DIAGNOSIS — R06.09 DYSPNEA ON EXERTION: ICD-10-CM

## 2024-08-14 DIAGNOSIS — E78.2 MIXED HYPERLIPIDEMIA: ICD-10-CM

## 2024-08-14 DIAGNOSIS — I50.20 ACC/AHA STAGE C SYSTOLIC HEART FAILURE (HCC): ICD-10-CM

## 2024-08-14 DIAGNOSIS — I10 ESSENTIAL HYPERTENSION: ICD-10-CM

## 2024-08-14 PROCEDURE — 99211 OFF/OP EST MAY X REQ PHY/QHP: CPT | Performed by: INTERNAL MEDICINE

## 2024-08-14 PROCEDURE — 99214 OFFICE O/P EST MOD 30 MIN: CPT | Performed by: INTERNAL MEDICINE

## 2024-08-14 PROCEDURE — 3077F SYST BP >= 140 MM HG: CPT | Performed by: INTERNAL MEDICINE

## 2024-08-14 PROCEDURE — 99212 OFFICE O/P EST SF 10 MIN: CPT | Performed by: INTERNAL MEDICINE

## 2024-08-14 PROCEDURE — 3080F DIAST BP >= 90 MM HG: CPT | Performed by: INTERNAL MEDICINE

## 2024-08-14 RX ORDER — EMPAGLIFLOZIN 10 MG/1
10 TABLET, FILM COATED ORAL DAILY
Qty: 90 TABLET | Refills: 4 | Status: SHIPPED | OUTPATIENT
Start: 2024-08-14

## 2024-08-14 RX ORDER — ASPIRIN 81 MG/1
81 TABLET, CHEWABLE ORAL DAILY
Qty: 100 TABLET | Refills: 4 | Status: SHIPPED | OUTPATIENT
Start: 2024-08-14

## 2024-08-14 RX ORDER — SPIRONOLACTONE 25 MG/1
25 TABLET ORAL DAILY
Qty: 100 TABLET | Refills: 4 | Status: SHIPPED | OUTPATIENT
Start: 2024-08-14

## 2024-08-14 RX ORDER — ATORVASTATIN CALCIUM 80 MG/1
80 TABLET, FILM COATED ORAL DAILY
Qty: 100 TABLET | Refills: 4 | Status: SHIPPED | OUTPATIENT
Start: 2024-08-14

## 2024-08-14 RX ORDER — SACUBITRIL AND VALSARTAN 97; 103 MG/1; MG/1
1 TABLET, FILM COATED ORAL 2 TIMES DAILY
Qty: 180 TABLET | Refills: 4 | Status: SHIPPED | OUTPATIENT
Start: 2024-08-14

## 2024-08-14 RX ORDER — CARVEDILOL 25 MG/1
25 TABLET ORAL 2 TIMES DAILY WITH MEALS
Qty: 180 TABLET | Refills: 4 | Status: SHIPPED | OUTPATIENT
Start: 2024-08-14

## 2024-08-14 ASSESSMENT — ENCOUNTER SYMPTOMS
PND: 0
BRUISES/BLEEDS EASILY: 0
COUGH: 0
PALPITATIONS: 0
BLOOD IN STOOL: 0
SORE THROAT: 0
DEPRESSION: 0
ORTHOPNEA: 0
FALLS: 0
EYE REDNESS: 0
CLAUDICATION: 0
SHORTNESS OF BREATH: 1
DIZZINESS: 0

## 2024-08-14 NOTE — PROGRESS NOTES
Chief Complaint   Patient presents with    CHF (Systolic)       Subjective:   Cheikh Chacko is a 62 y.o. male who presents today for cardiac care and evaluation due to abnormal EKG, HTN, established CAD s/p coronary stents in 2020.    LVEF of 45% with global hypokinesis. No significant valvular disease. I have independently interpreted and reviewed echocardiogram's actual images.     Negative PET scan stress test.    Insurance did not approve Jardiance.    Patient does get winded upon walking up inclines or for distance. No symptoms at rest or with daily living activities. Now also having exertional chest pain.    I have independently interpreted and reviewed blood tests results with patient in clinic which shows normal LDL level 99, triglycerides 115, renal and liver function. NT pro BNP of 44. HgbA1C of 6.7.        Past Medical History:   Diagnosis Date    Hyperlipidemia     Hypertension     Sleep apnea      Past Surgical History:   Procedure Laterality Date    DENTAL SURGERY      STENT PLACEMENT      CARDIAC     Family History   Problem Relation Age of Onset    Diabetes Sister     Diabetes Brother     Cancer Neg Hx     Heart Disease Neg Hx     Stroke Neg Hx     Alcohol/Drug Neg Hx      Social History     Socioeconomic History    Marital status:      Spouse name: Not on file    Number of children: Not on file    Years of education: Not on file    Highest education level: Not on file   Occupational History    Not on file   Tobacco Use    Smoking status: Former     Current packs/day: 0.00     Average packs/day: 1 pack/day for 34.0 years (34.0 ttl pk-yrs)     Types: Cigarettes     Start date: 2/10/1986     Quit date: 2/10/2020     Years since quittin.5    Smokeless tobacco: Never   Vaping Use    Vaping status: Never Used   Substance and Sexual Activity    Alcohol use: No    Drug use: No    Sexual activity: Yes     Partners: Female     Comment: two children, and two step children    Other Topics Concern     Not on file   Social History Narrative    Not on file     Social Determinants of Health     Financial Resource Strain: Not on file   Food Insecurity: Not on file   Transportation Needs: Not on file   Physical Activity: Not on file   Stress: Not on file   Social Connections: Unknown (1/31/2022)    Social Connection and Isolation Panel [NHANES]     Frequency of Communication with Friends and Family: Not on file     Frequency of Social Gatherings with Friends and Family: Not on file     Attends Taoist Services: Not on file     Active Member of Clubs or Organizations: Not on file     Attends Club or Organization Meetings: Not on file     Marital Status:    Intimate Partner Violence: Not on file   Housing Stability: Not on file     No Known Allergies  Outpatient Encounter Medications as of 8/14/2024   Medication Sig Dispense Refill    aspirin (ASA) 81 MG Chew Tab chewable tablet Chew 1 Tablet every day. 100 Tablet 4    atorvastatin (LIPITOR) 80 MG tablet Take 1 Tablet by mouth every day. 100 Tablet 4    carvedilol (COREG) 25 MG Tab Take 1 Tablet by mouth 2 times a day with meals. 180 Tablet 4    Empagliflozin (JARDIANCE) 10 MG Tab tablet Take 1 Tablet by mouth every day. 90 Tablet 4    sacubitril-valsartan (ENTRESTO)  MG Tab Take 1 Tablet by mouth 2 times a day. 180 Tablet 4    spironolactone (ALDACTONE) 25 MG Tab Take 1 Tablet by mouth every day. Please call [phone] and schedule follow up appointment for further refills. Thank you 100 Tablet 4    [DISCONTINUED] Empagliflozin (JARDIANCE) 10 MG Tab tablet Take 1 Tablet by mouth every day. 60 Tablet 0    [DISCONTINUED] spironolactone (ALDACTONE) 25 MG Tab Take 1 Tablet by mouth every day. Please call [phone] and schedule follow up appointment for further refills. Thank you 90 Tablet 0    [DISCONTINUED] carvedilol (COREG) 25 MG Tab TAKE 1 TABLET BY MOUTH TWICE A DAY WITH MEALS 180 Tablet 3    [DISCONTINUED] sacubitril-valsartan (ENTRESTO)   "MG Tab Take 1 Tablet by mouth 2 times a day. 180 Tablet 3    [DISCONTINUED] atorvastatin (LIPITOR) 80 MG tablet Take 1 Tablet by mouth every day. 90 Tablet 3    [DISCONTINUED] aspirin (ASA) 81 MG Chew Tab chewable tablet Chew 81 mg every day.       No facility-administered encounter medications on file as of 8/14/2024.     Review of Systems   Constitutional:  Negative for malaise/fatigue.   HENT:  Negative for sore throat.    Eyes:  Negative for redness.   Respiratory:  Positive for shortness of breath. Negative for cough.    Cardiovascular:  Positive for chest pain. Negative for palpitations, orthopnea, claudication, leg swelling and PND.   Gastrointestinal:  Negative for blood in stool and melena.   Musculoskeletal:  Negative for falls.   Skin:  Negative for rash.   Neurological:  Negative for dizziness.   Endo/Heme/Allergies:  Does not bruise/bleed easily.   Psychiatric/Behavioral:  Negative for depression.         Objective:   BP (!) 162/98 (BP Location: Left arm, Patient Position: Sitting, BP Cuff Size: Adult)   Pulse 69   Resp 16   Ht 1.6 m (5' 3\")   Wt 87 kg (191 lb 12.8 oz)   SpO2 97%   BMI 33.98 kg/m²     Physical Exam  Vitals and nursing note reviewed.   Constitutional:       General: He is not in acute distress.     Appearance: He is not diaphoretic.   HENT:      Head: Normocephalic and atraumatic.      Right Ear: External ear normal.      Left Ear: External ear normal.      Nose: No congestion or rhinorrhea.   Eyes:      General:         Right eye: No discharge.         Left eye: No discharge.   Neck:      Thyroid: No thyromegaly.      Vascular: No JVD.   Cardiovascular:      Rate and Rhythm: Normal rate and regular rhythm.      Pulses: Normal pulses.   Pulmonary:      Effort: No respiratory distress.   Abdominal:      General: There is no distension.      Tenderness: There is no abdominal tenderness.   Musculoskeletal:         General: No swelling or tenderness.      Right lower leg: No edema.     "  Left lower leg: No edema.   Skin:     General: Skin is warm and dry.   Neurological:      Mental Status: He is alert and oriented to person, place, and time.      Cranial Nerves: No cranial nerve deficit.   Psychiatric:         Behavior: Behavior normal.         Assessment:     1. Coronary artery disease of native artery of native heart with stable angina pectoris (HCC)  EC-ECHOCARDIOGRAM COMPLETE W/O CONT    NM-CARDIAC STRESS TEST    aspirin (ASA) 81 MG Chew Tab chewable tablet      2. Mixed hyperlipidemia  atorvastatin (LIPITOR) 80 MG tablet      3. S/P primary angioplasty with coronary stent  carvedilol (COREG) 25 MG Tab      4. ST elevation (STEMI) myocardial infarction involving other coronary artery of anterior wall ([facility])  carvedilol (COREG) 25 MG Tab      5. Essential hypertension  carvedilol (COREG) 25 MG Tab      6. ACC/AHA stage C systolic heart failure ([facility])  Empagliflozin (JARDIANCE) 10 MG Tab tablet    sacubitril-valsartan (ENTRESTO)  MG Tab    spironolactone (ALDACTONE) 25 MG Tab      7. Organic heart disease, NYHA class 2  sacubitril-valsartan (ENTRESTO)  MG Tab      8. Dyspnea on exertion  sacubitril-valsartan (ENTRESTO)  MG Tab            Medical Decision Making:  Today's Assessment / Status / Plan:   Coronary arterial disease s/p 2 stents in 02/2020 (unknown distribution) with cardiomyopathy with LVEF of 45%:    At this time, to further risk stratify, I will order a transthoracic echocardiogram to assess cardiac and valvular functions. I will also order a myocardial nuclear scan stress test to assess for coronary ischemia.    Betablocker as Carvedilol 25 mg po 2x daily.  ASA 81 mg po daily.  Statin as Atorvastatin 80 mg po daily  Will continue Entresto 97/103 mg BID.    Continue Spironolactone 25 mg daily.    Based on recent data on SGLT2 and heart failure with reduced ejection fraction, patient will be benefited from Jardiance 10 mg p.o. once a day for further reduction in  mortality and hospitalization with absolute risk reduction of 5.2%.  Therefore, I will continue patient on Jardiance 10 mg p.o. once a day.  Risks and benefits were explained to patient and patient has agreed to proceed.     Hypertension:  Bp is high today but patient did not take his medications today.  Will continue Entresto 97/103 mg BID.  Optimize control with BB, ARNI as permitted above in conjunction with CAD treatment.     Hyperlipidemia:  Optimize statin as within guidelines of CAD treatment as above.    In terms of patient's co-morbiditie such as established cardiovascular disease and type II diabetes mellitus, based on recent trial, patient is indicated to be placed on Empagliflozin for mortality reduction benefit. Based on recent data, such therapy has a relative risk reduction of 38% and absolute risk reduction of 2.2% for cardiovascular death. I will start patient on Jardiance 10 mg once a day. I discussed with patient about potential benefits and risks. Patient understands that this is an adjunct to current regimen of sugar lowering agents and cardiac agents.    Overall, based on prior history of obstructive coronary arterial disease, patient is at at high risk for recurrent events and will require consistent ongoing guidelines directed medical therapy to reduce his cardiovascular mortality and associated complications.    I thank you for referring patient to our Cardiology Clinic today.      Donald Rondon MD.   [facility].  [phone]  Ariadna Crawley.

## 2024-09-11 ENCOUNTER — TELEPHONE (OUTPATIENT)
Dept: HEALTH INFORMATION MANAGEMENT | Facility: OTHER | Age: 64
End: 2024-09-11
Payer: COMMERCIAL

## 2024-09-18 ENCOUNTER — TELEPHONE (OUTPATIENT)
Dept: HEALTH INFORMATION MANAGEMENT | Facility: OTHER | Age: 64
End: 2024-09-18
Payer: COMMERCIAL

## 2024-09-29 ENCOUNTER — SLEEP STUDY (OUTPATIENT)
Dept: SLEEP MEDICINE | Facility: MEDICAL CENTER | Age: 64
End: 2024-09-29
Attending: FAMILY MEDICINE
Payer: COMMERCIAL

## 2024-09-29 DIAGNOSIS — G47.33 OSA (OBSTRUCTIVE SLEEP APNEA): ICD-10-CM

## 2024-09-29 DIAGNOSIS — G47.34 NOCTURNAL HYPOXIA: ICD-10-CM

## 2024-09-29 PROCEDURE — 95810 POLYSOM 6/> YRS 4/> PARAM: CPT | Performed by: PREVENTIVE MEDICINE

## 2024-10-22 ENCOUNTER — TELEPHONE (OUTPATIENT)
Dept: CARDIOLOGY | Facility: MEDICAL CENTER | Age: 64
End: 2024-10-22

## 2024-10-22 ENCOUNTER — HOSPITAL ENCOUNTER (OUTPATIENT)
Dept: CARDIOLOGY | Facility: MEDICAL CENTER | Age: 64
End: 2024-10-22
Attending: INTERNAL MEDICINE
Payer: COMMERCIAL

## 2024-10-22 DIAGNOSIS — I25.118 CORONARY ARTERY DISEASE OF NATIVE ARTERY OF NATIVE HEART WITH STABLE ANGINA PECTORIS (HCC): ICD-10-CM

## 2024-10-22 LAB
LV EJECT FRACT  99904: 55
LV EJECT FRACT MOD 2C 99903: 59.47
LV EJECT FRACT MOD 4C 99902: 50.79
LV EJECT FRACT MOD BP 99901: 53

## 2024-10-22 PROCEDURE — 93306 TTE W/DOPPLER COMPLETE: CPT

## 2024-10-22 PROCEDURE — 93306 TTE W/DOPPLER COMPLETE: CPT | Mod: 26 | Performed by: INTERNAL MEDICINE

## 2024-10-28 ENCOUNTER — HOSPITAL ENCOUNTER (OUTPATIENT)
Dept: RADIOLOGY | Facility: MEDICAL CENTER | Age: 64
End: 2024-10-28
Attending: INTERNAL MEDICINE
Payer: COMMERCIAL

## 2024-10-28 DIAGNOSIS — I25.118 CORONARY ARTERY DISEASE OF NATIVE ARTERY OF NATIVE HEART WITH STABLE ANGINA PECTORIS (HCC): ICD-10-CM

## 2024-10-28 DIAGNOSIS — G47.33 OSA (OBSTRUCTIVE SLEEP APNEA): ICD-10-CM

## 2024-11-06 ENCOUNTER — APPOINTMENT (OUTPATIENT)
Dept: RADIOLOGY | Facility: MEDICAL CENTER | Age: 64
End: 2024-11-06
Attending: INTERNAL MEDICINE
Payer: COMMERCIAL

## 2024-11-06 PROCEDURE — A9502 TC99M TETROFOSMIN: HCPCS

## 2024-11-06 PROCEDURE — 93018 CV STRESS TEST I&R ONLY: CPT | Performed by: INTERNAL MEDICINE

## 2024-11-06 PROCEDURE — 78452 HT MUSCLE IMAGE SPECT MULT: CPT | Mod: 26 | Performed by: INTERNAL MEDICINE

## 2024-12-11 ENCOUNTER — SLEEP STUDY (OUTPATIENT)
Dept: SLEEP MEDICINE | Facility: MEDICAL CENTER | Age: 64
End: 2024-12-11
Attending: FAMILY MEDICINE
Payer: COMMERCIAL

## 2024-12-11 DIAGNOSIS — G47.33 OSA (OBSTRUCTIVE SLEEP APNEA): ICD-10-CM

## 2024-12-11 PROCEDURE — 95811 POLYSOM 6/>YRS CPAP 4/> PARM: CPT | Performed by: STUDENT IN AN ORGANIZED HEALTH CARE EDUCATION/TRAINING PROGRAM

## 2024-12-11 NOTE — Clinical Note
I would encourage a follow up in our sleep medicine clinic before 2/11/2025. He was last seen in our clinic on 2/11/2022 so he is still an established patient. He can follow up with any of our providers if he is willing.

## 2024-12-12 NOTE — PROCEDURES
Patient: SHANITA NEVES  ID: [id number] Date: 12/11/2024    MONTAGE: Standard  STUDY TYPE: Treatment  RECORDING TECHNIQUE:   After the scalp was prepared, gold plated electrodes were applied to the scalp according to the International 10-20 System. EEG (electroencephalogram) was continuously monitored from the O1-M2, O2-M1, C3-M2, C4-M1, F3-M2, and F4-M1. EOGs (electrooculograms) were monitored by electrodes placed at the left and right outer canthi. Chin EMG (electromyogram) was monitored by electrodes placed on the mentalis and sub-mentalis muscles. Nasal and oral airflow were monitored using a triple port thermocouple as well as oronasal pressure transducer. Respiratory effort was measured by inductive plethysmography technology employing abdominal and thoracic belts. Blood oxygen saturation and pulse were monitored by pulse oximetry. Heart rhythm was monitored by surface electrocardiogram. Leg EMG was studied using surface electrodes placed on left and right anterior tibialis. A microphone was used to monitor tracheal sounds and snoring. Body position was monitored and documented by technician observation.   SCORING CRITERIA:   A modification of the AASM manual for scoring of sleep and associated events was used. Obstructive apneas were scored by cessation of airflow for at least 10 seconds with continuing respiratory effort. Central apneas were scored by cessation of airflow for at least 10 seconds with no respiratory effort. Hypopneas were scored by a 30% or more reduction in airflow for at least 10 seconds accompanied by arterial oxygen desaturation of 3% or an arousal. For CMS (Medicare) patients, per AASM rule 1B, hypopneas are scored by 30% with mild reduction in airflow for at least 10 seconds accompanied by arterial saturation decreased at 4%.  Study start time was 10:25:37 PM. Diagnostic recording time was 7h 24.5m with a total sleep time of 6h 25.0m resulting in a sleep efficiency of 86.61%%. Sleep  latency from the start of the study was 14 minutes and the latency from sleep to REM was 54 minutes. In total,60 arousals were scored for an arousal index of 9.4.  Respiratory:  There were a total of 49 apneas consisting of 0 obstructive apneas, 0 mixed apneas, and 49 central apneas. A total of 79 hypopneas were scored. The apnea index was 7.64 per hour and the hypopnea index was 12.31 per hour resulting in an overall AHI of 19.95. AHI during REM was 15.7 and AHI while supine was 19.95.  Central respiratory events accounted for 60.2% of respiratory events  Oximetry:  There was a mean oxygen saturation of 94.0%. The minimum oxygen saturation in NREM was 82.0 % and in REM was 87.0%. The patient spent 1.1 minutes of TST with SaO2 <88%.  Cardiac:  The highest heart rate seen while awake was 87 BPM while the highest heart rate during sleep was 85 BPM with an average sleeping heart rate of 60 BPM.  Limb Movements:  There were a total of 0 PLMs during sleep which resulted in a PLMS index of 0.0. Of these, 0 were associated with arousals which resulted in a PLMS arousal index of 0.0.  Titration:   CPAP was tried from 5 to 10. BiPAP was tried from 9/5 to 10/6.  ASV was tried at EPAP: 5cm PS: 3/10c  This was a fully attended sleep study. This test was technically adequate. This patient was titrated on CPAP starting at 5 cm of water pressure. Patient was titrated up to ASV EPAP 5 PS 3-10 cm of water pressure.    Impression:  1.  Patient used CPAP, BiPAP and ASV during study  2.  Elevated central respiratory events accounting for 60.2% of total events meeting criteria for complex sleep apnea  3.  Supine REM sleep was seen during study  4.  Given elevated central apneic events ASV was started at end of study     Recommendations:  I recommend auto ASV EPAP 5-9 pressure support 3-15 cmH2O.  Patient used a medium wide DreamWear mask during night of study.    I also recommend 60 day compliance download to assess the efficacy to the  recommended pressure, measure leak, apnea hypopnea index and compliance for further outpatient monitoring and management of PAP therapy. In some cases alternative treatment options may be proven effective in resolving sleep apnea. These options include upper airway surgery, the use of a dental orthotic, weight loss, or positional therapy. Clinical correlation is required. In general patients with sleep apnea are advised to avoid alcohol, sedatives and not to operate a motor vehicle while drowsy.  Untreated sleep apnea increases the risk for cardiovascular and neurovascular disease.

## 2024-12-20 ENCOUNTER — TELEPHONE (OUTPATIENT)
Dept: MEDICAL GROUP | Facility: MEDICAL CENTER | Age: 64
End: 2024-12-20
Payer: COMMERCIAL

## 2024-12-21 NOTE — TELEPHONE ENCOUNTER
----- Message from Physician Davidson Cervantes M.D. sent at 12/17/2024  2:20 PM PST -----  Please call patient regarding his titration study results.  Recommendation is to do ASV which is adaptive support ventilation which is a mechanical ventilation that automatically adjust pressure setting based on patient's breathing needs while they are asleep.    For this they are recommending to see sleep medicine provider.  Please recommend him to call sleep medicine office to schedule appointment before February as he is an established patient there.

## 2024-12-23 ENCOUNTER — HOSPITAL ENCOUNTER (OUTPATIENT)
Dept: LAB | Facility: MEDICAL CENTER | Age: 64
End: 2024-12-23
Attending: FAMILY MEDICINE
Payer: COMMERCIAL

## 2024-12-23 DIAGNOSIS — E11.69 DYSLIPIDEMIA ASSOCIATED WITH TYPE 2 DIABETES MELLITUS (HCC): ICD-10-CM

## 2024-12-23 DIAGNOSIS — Z12.5 SCREENING FOR PROSTATE CANCER: ICD-10-CM

## 2024-12-23 DIAGNOSIS — E11.69 TYPE 2 DIABETES MELLITUS WITH OTHER SPECIFIED COMPLICATION, WITHOUT LONG-TERM CURRENT USE OF INSULIN (HCC): ICD-10-CM

## 2024-12-23 DIAGNOSIS — E78.5 DYSLIPIDEMIA ASSOCIATED WITH TYPE 2 DIABETES MELLITUS (HCC): ICD-10-CM

## 2024-12-23 LAB
ALBUMIN SERPL BCP-MCNC: 3.9 G/DL (ref 3.2–4.9)
ALBUMIN/GLOB SERPL: 1.3 G/DL
ALP SERPL-CCNC: 51 U/L (ref 30–99)
ALT SERPL-CCNC: 24 U/L (ref 2–50)
ANION GAP SERPL CALC-SCNC: 10 MMOL/L (ref 7–16)
AST SERPL-CCNC: 16 U/L (ref 12–45)
BILIRUB SERPL-MCNC: 0.5 MG/DL (ref 0.1–1.5)
BUN SERPL-MCNC: 17 MG/DL (ref 8–22)
CALCIUM ALBUM COR SERPL-MCNC: 8.5 MG/DL (ref 8.5–10.5)
CALCIUM SERPL-MCNC: 8.4 MG/DL (ref 8.4–10.2)
CHLORIDE SERPL-SCNC: 107 MMOL/L (ref 96–112)
CHOLEST SERPL-MCNC: 183 MG/DL (ref 100–199)
CO2 SERPL-SCNC: 22 MMOL/L (ref 20–33)
CREAT SERPL-MCNC: 0.75 MG/DL (ref 0.5–1.4)
EST. AVERAGE GLUCOSE BLD GHB EST-MCNC: 151 MG/DL
FASTING STATUS PATIENT QL REPORTED: NORMAL
GFR SERPLBLD CREATININE-BSD FMLA CKD-EPI: 99 ML/MIN/1.73 M 2
GLOBULIN SER CALC-MCNC: 3.1 G/DL (ref 1.9–3.5)
GLUCOSE SERPL-MCNC: 140 MG/DL (ref 65–99)
HBA1C MFR BLD: 6.9 % (ref 4–5.6)
HDLC SERPL-MCNC: 43 MG/DL
LDLC SERPL CALC-MCNC: 116 MG/DL
POTASSIUM SERPL-SCNC: 3.6 MMOL/L (ref 3.6–5.5)
PROT SERPL-MCNC: 7 G/DL (ref 6–8.2)
PSA SERPL-MCNC: 2.27 NG/ML (ref 0–4)
SODIUM SERPL-SCNC: 139 MMOL/L (ref 135–145)
TRIGL SERPL-MCNC: 121 MG/DL (ref 0–149)
VIT B12 SERPL-MCNC: 442 PG/ML (ref 211–911)

## 2024-12-23 PROCEDURE — 82607 VITAMIN B-12: CPT

## 2024-12-23 PROCEDURE — 80053 COMPREHEN METABOLIC PANEL: CPT

## 2024-12-23 PROCEDURE — 84153 ASSAY OF PSA TOTAL: CPT

## 2024-12-23 PROCEDURE — 83036 HEMOGLOBIN GLYCOSYLATED A1C: CPT

## 2024-12-23 PROCEDURE — 80061 LIPID PANEL: CPT

## 2024-12-23 PROCEDURE — 36415 COLL VENOUS BLD VENIPUNCTURE: CPT

## 2024-12-24 NOTE — RESULT ENCOUNTER NOTE
Called LVM asking the patient to call our office back to go over message left in patient's chart.

## 2024-12-27 ENCOUNTER — OFFICE VISIT (OUTPATIENT)
Dept: MEDICAL GROUP | Facility: MEDICAL CENTER | Age: 64
End: 2024-12-27
Payer: COMMERCIAL

## 2024-12-27 VITALS
SYSTOLIC BLOOD PRESSURE: 104 MMHG | HEART RATE: 76 BPM | DIASTOLIC BLOOD PRESSURE: 66 MMHG | TEMPERATURE: 96.9 F | WEIGHT: 193.12 LBS | BODY MASS INDEX: 34.22 KG/M2 | HEIGHT: 63 IN | OXYGEN SATURATION: 97 %

## 2024-12-27 DIAGNOSIS — E11.69 DYSLIPIDEMIA ASSOCIATED WITH TYPE 2 DIABETES MELLITUS (HCC): ICD-10-CM

## 2024-12-27 DIAGNOSIS — E11.69 TYPE 2 DIABETES MELLITUS WITH OTHER SPECIFIED COMPLICATION, WITHOUT LONG-TERM CURRENT USE OF INSULIN (HCC): ICD-10-CM

## 2024-12-27 DIAGNOSIS — J02.9 SORE THROAT: ICD-10-CM

## 2024-12-27 DIAGNOSIS — E78.5 DYSLIPIDEMIA ASSOCIATED WITH TYPE 2 DIABETES MELLITUS (HCC): ICD-10-CM

## 2024-12-27 PROCEDURE — 3074F SYST BP LT 130 MM HG: CPT | Performed by: FAMILY MEDICINE

## 2024-12-27 PROCEDURE — 3078F DIAST BP <80 MM HG: CPT | Performed by: FAMILY MEDICINE

## 2024-12-27 PROCEDURE — 99214 OFFICE O/P EST MOD 30 MIN: CPT | Performed by: FAMILY MEDICINE

## 2024-12-27 ASSESSMENT — ENCOUNTER SYMPTOMS
WHEEZING: 0
SORE THROAT: 1
CHILLS: 0
SHORTNESS OF BREATH: 0
FEVER: 0
COUGH: 1

## 2024-12-28 NOTE — PROGRESS NOTES
Verbal consent was acquired by the patient to use [employer] ambient listening note generation during this visit.      Cheikh was seen today for follow-up.    Diagnoses and all orders for this visit:    Sore throat  -     amoxicillin-clavulanate (AUGMENTIN) 875-125 MG Tab; Take 1 Tablet by mouth 2 times a day for 7 days.    Type 2 diabetes mellitus with other specified complication, without long-term current use of insulin (HCC)  -     Comp Metabolic Panel; Future  -     HEMOGLOBIN A1C; Future    Dyslipidemia associated with type 2 diabetes mellitus (HCC)  -     Lipid Profile; Future                  Assessment & Plan  1. Pharyngitis, sore throat  New condition, unstable, likely bacterial infection by this time  - Experiencing sore throat since last week without fever or cough  -Start Augmentin 1 tablet 2 times daily.  Recommend patient to do warm salt gargles    2. Elevated cholesterol, dyslipidemia associated with type 2 diabetes mellitus  Chronic condition, unstable  - Recent blood test showed slight increase in cholesterol levels  - Continue current medication regimen Lipitor 80 mg daily.  If not getting better then we will add Zetia.  - Work on dietary modifications to manage cholesterol levels    3.  Type 2 diabetes mellitus  Chronic condition, stable  - A1C increased from 6.7 to 6.9  - Continue current medication regimen Jardiance 25 mg daily  - Work on dietary modifications to manage blood sugar levels  - Repeat blood test in 4 months to monitor A1C levels    4. Sleep apnea  - Appointment with sleep doctor next month    Follow-up in 4 months for lab follow-up.          Chief complaint::Diagnoses of Sore throat, Type 2 diabetes mellitus with other specified complication, without long-term current use of insulin (HCC), and Dyslipidemia associated with type 2 diabetes mellitus (HCC) were pertinent to this visit.      History of Present Illness  The patient is a 64-year-old male who presents for  "follow-up.    Sore Throat  - The patient has been experiencing a sore throat since the previous week.  - He has been managing it with Mucinex.  - He reports no associated fever or cough.    Current Medications  - He is currently on spironolactone as prescribed by his cardiologist.    Supplemental information: He has an upcoming appointment with his sleep specialist scheduled for next month.          Review of Systems   Constitutional:  Negative for chills and fever.   HENT:  Positive for sore throat. Negative for ear discharge and ear pain.    Respiratory:  Positive for cough. Negative for shortness of breath and wheezing.           Medications and Allergies:     Current Outpatient Medications   Medication Sig Dispense Refill    amoxicillin-clavulanate (AUGMENTIN) 875-125 MG Tab Take 1 Tablet by mouth 2 times a day for 7 days. 14 Tablet 0    aspirin (ASA) 81 MG Chew Tab chewable tablet Chew 1 Tablet every day. 100 Tablet 4    atorvastatin (LIPITOR) 80 MG tablet Take 1 Tablet by mouth every day. 100 Tablet 4    carvedilol (COREG) 25 MG Tab Take 1 Tablet by mouth 2 times a day with meals. 180 Tablet 4    Empagliflozin (JARDIANCE) 10 MG Tab tablet Take 1 Tablet by mouth every day. 90 Tablet 4    sacubitril-valsartan (ENTRESTO)  MG Tab Take 1 Tablet by mouth 2 times a day. 180 Tablet 4    spironolactone (ALDACTONE) 25 MG Tab Take 1 Tablet by mouth every day. Please call [phone] and schedule follow up appointment for further refills. Thank you 100 Tablet 4     No current facility-administered medications for this visit.       /66 (BP Location: Left arm, Patient Position: Sitting, BP Cuff Size: Adult)   Pulse 76   Temp 36.1 °C (96.9 °F) (Temporal)   Ht 1.6 m (5' 3\")   Wt 87.6 kg (193 lb 2 oz)   SpO2 97% , Body mass index is 34.21 kg/m².      Physical Exam  Constitutional:       Appearance: Normal appearance. He is well-developed and well-groomed.   HENT:      Head: Normocephalic and atraumatic.      " Right Ear: External ear normal.      Left Ear: External ear normal.      Mouth/Throat:      Pharynx: Posterior oropharyngeal erythema present.   Eyes:      General:         Right eye: No discharge.         Left eye: No discharge.      Conjunctiva/sclera: Conjunctivae normal.   Cardiovascular:      Rate and Rhythm: Normal rate.   Pulmonary:      Effort: Pulmonary effort is normal. No respiratory distress.   Musculoskeletal:      Cervical back: Neck supple.   Skin:     Findings: No rash.   Neurological:      Mental Status: He is alert.   Psychiatric:         Mood and Affect: Mood and affect normal.         Behavior: Behavior normal.            I reviewed with patient lab results resulted on 12/23/2024.        Please note that this dictation was created using voice recognition software. I have made every reasonable attempt to correct obvious errors, but I expect that there are errors of grammar and possibly content that I did not discover before finalizing the note.

## 2025-01-11 DIAGNOSIS — I50.20 ACC/AHA STAGE C SYSTOLIC HEART FAILURE (HCC): ICD-10-CM

## 2025-01-13 RX ORDER — SPIRONOLACTONE 25 MG/1
25 TABLET ORAL DAILY
Qty: 100 TABLET | Refills: 1 | Status: SHIPPED | OUTPATIENT
Start: 2025-01-13

## 2025-01-28 ENCOUNTER — APPOINTMENT (OUTPATIENT)
Dept: CARDIOLOGY | Facility: MEDICAL CENTER | Age: 65
End: 2025-01-28
Attending: INTERNAL MEDICINE
Payer: COMMERCIAL

## 2025-01-28 ENCOUNTER — OFFICE VISIT (OUTPATIENT)
Dept: SLEEP MEDICINE | Facility: MEDICAL CENTER | Age: 65
End: 2025-01-28
Attending: STUDENT IN AN ORGANIZED HEALTH CARE EDUCATION/TRAINING PROGRAM
Payer: COMMERCIAL

## 2025-01-28 VITALS
WEIGHT: 183.1 LBS | BODY MASS INDEX: 32.44 KG/M2 | DIASTOLIC BLOOD PRESSURE: 84 MMHG | SYSTOLIC BLOOD PRESSURE: 126 MMHG | HEIGHT: 63 IN | RESPIRATION RATE: 16 BRPM | HEART RATE: 73 BPM | OXYGEN SATURATION: 98 %

## 2025-01-28 DIAGNOSIS — G47.33 OSA (OBSTRUCTIVE SLEEP APNEA): ICD-10-CM

## 2025-01-28 PROCEDURE — 99213 OFFICE O/P EST LOW 20 MIN: CPT | Performed by: STUDENT IN AN ORGANIZED HEALTH CARE EDUCATION/TRAINING PROGRAM

## 2025-01-28 PROCEDURE — 99214 OFFICE O/P EST MOD 30 MIN: CPT | Performed by: STUDENT IN AN ORGANIZED HEALTH CARE EDUCATION/TRAINING PROGRAM

## 2025-01-28 PROCEDURE — 3079F DIAST BP 80-89 MM HG: CPT | Performed by: STUDENT IN AN ORGANIZED HEALTH CARE EDUCATION/TRAINING PROGRAM

## 2025-01-28 PROCEDURE — 3074F SYST BP LT 130 MM HG: CPT | Performed by: STUDENT IN AN ORGANIZED HEALTH CARE EDUCATION/TRAINING PROGRAM

## 2025-01-28 NOTE — PROGRESS NOTES
[facility] Follow-up Visit    CC: sleep study follow up      HPI:  Cheikh Chacko is a 64 y.o.male (former smoker 1 ppd for 34 years quit on 2/10/2020)  with history of JOSE, hypertension, STEMI in 2/20/2020 status post angioplasty with coronary stent, hyperlipidemia who presents for sleep study follow-up.    Titration PSG 12/11/2024 -elevated central respiratory events accounting for over 60% of total events being criteria for complex sleep apnea.  Given elevated central apneic events ASV was started at the end of the study.  Auto ASV EPAP 5-9 pressure support 3-15 recommended.    Diagnostic PSG -severe JOSE, AHI 98.7.  Severe nocturnal desaturation with roxanne 78%, 18.6 minutes of total sleep time less than 88%    Endorses years of snoring, fragmented sleep  Bedtime: 10:30 p  Wake up: 4: am   Fragmented sleep 1-2x  SOL: within 30 mins  Naps: no    No symptoms of RLS, parasomnia, narcolepsy      Patient Active Problem List    Diagnosis Date Noted    History of tobacco abuse (Quit >6 mos ago) 10/20/2022    ST elevation (STEMI) myocardial infarction involving other coronary artery of anterior wall (HCC) 01/31/2022    Type 2 diabetes mellitus with other specified complication (HCC) 01/31/2022    Dyslipidemia associated with type 2 diabetes mellitus (HCC) 01/31/2022    S/P primary angioplasty with coronary stent 01/31/2022    JOSE (obstructive sleep apnea) 01/25/2019    Essential hypertension 06/22/2018    Smoking greater than 30 pack years 06/22/2018    Obesity (BMI 30-39.9) 06/22/2018       Past Medical History:   Diagnosis Date    Hyperlipidemia     Hypertension     Sleep apnea         Past Surgical History:   Procedure Laterality Date    DENTAL SURGERY      STENT PLACEMENT      CARDIAC       Family History   Problem Relation Age of Onset    Diabetes Sister     Diabetes Brother     Cancer Neg Hx     Heart Disease Neg Hx     Stroke Neg Hx     Alcohol/Drug Neg Hx        Social History     Socioeconomic  History    Marital status:      Spouse name: Not on file    Number of children: Not on file    Years of education: Not on file    Highest education level: Not on file   Occupational History    Not on file   Tobacco Use    Smoking status: Former     Current packs/day: 0.00     Average packs/day: 1 pack/day for 34.0 years (34.0 ttl pk-yrs)     Types: Cigarettes     Start date: 2/10/1986     Quit date: 2/10/2020     Years since quittin.9    Smokeless tobacco: Never   Vaping Use    Vaping status: Never Used   Substance and Sexual Activity    Alcohol use: No    Drug use: No    Sexual activity: Yes     Partners: Female     Comment: two children, and two step children    Other Topics Concern    Not on file   Social History Narrative    Not on file     Social Drivers of Health     Financial Resource Strain: Not on file   Food Insecurity: Not on file   Transportation Needs: Not on file   Physical Activity: Not on file   Stress: Not on file   Social Connections: Unknown (2022)    Social Connection and Isolation Panel [NHANES]     Frequency of Communication with Friends and Family: Not on file     Frequency of Social Gatherings with Friends and Family: Not on file     Attends Islam Services: Not on file     Active Member of Clubs or Organizations: Not on file     Attends Club or Organization Meetings: Not on file     Marital Status:    Intimate Partner Violence: Not on file   Housing Stability: Not on file       Current Outpatient Medications   Medication Sig Dispense Refill    spironolactone (ALDACTONE) 25 MG Tab Take 1 Tablet by mouth every day. Please call [phone] and schedule follow up appointment for further refills. Thank you 100 Tablet 1    aspirin (ASA) 81 MG Chew Tab chewable tablet Chew 1 Tablet every day. 100 Tablet 4    atorvastatin (LIPITOR) 80 MG tablet Take 1 Tablet by mouth every day. 100 Tablet 4    carvedilol (COREG) 25 MG Tab Take 1 Tablet by mouth 2 times a day with meals.  "180 Tablet 4    Empagliflozin (JARDIANCE) 10 MG Tab tablet Take 1 Tablet by mouth every day. 90 Tablet 4    sacubitril-valsartan (ENTRESTO)  MG Tab Take 1 Tablet by mouth 2 times a day. 180 Tablet 4     No current facility-administered medications for this visit.        ALLERGIES: Patient has no known allergies.    ROS  Constitutional: Denies fevers, Denies weight changes  Ears/Nose/Throat/Mouth: Denies nasal congestion or sore throat   Cardiovascular: Denies chest pain  Respiratory: Denies shortness of breath, Denies cough  Gastrointestinal/Hepatic: Denies nausea, vomiting  Sleep: see HPI      PHYSICAL EXAM  /84 (BP Location: Left arm, Patient Position: Sitting, BP Cuff Size: Adult)   Pulse 73   Resp 16   Ht 1.6 m (5' 3\")   Wt 83.1 kg (183 lb 1.6 oz)   SpO2 98%   BMI 32.43 kg/m²   Appearance: Well-nourished, well-developed, no acute distress  Eyes:  No scleral icterus , EOMI  Musculoskeletal:  Grossly normal; gait and station normal; digits and nails normal  Skin:  No rashes, petechiae, cyanosis  Neurologic: without focal signs; oriented to person, time, place, and purpose; judgement intact      Medical Decision Making   Assessment and Plan  JOSE with treatment emergent central sleep apnea    The medical record was reviewed.  Diagnostic and titration nocturnal polysomnogram's reviewed.  We discussed his titration PSG in great detail.  Patient willing to go forward with auto ASV      PLAN:   -Order placed for mask and supplies   -ASV EPAP: 5-9, PS: 3/15, DME: [facility]  -Follow-up in 3 months to go over compliance and adherence and troubleshoot any issues that may arise  -Advised to [facility] out via [facility] with questions     Has been advised to start therapy, clean equipment frequently, and get new mask and supplies as allowed by insurance and DME. Recommend an earlier appointment, if significant treatment barriers develop.    Patients with JOSE are at increased risk of cardiovascular disease including " coronary artery disease, systemic arterial hypertension, pulmonary arterial hypertension, cardiac arrythmias, and stroke. The patient was advised to avoid driving a motor vehicle when drowsy.    Positive airway pressure will favorably impact many of the adverse conditions and effects provoked by JOSE.    Have advised the patient to follow up with the appropriate healthcare practitioners for all other medical problems and issues.    Return in about 3 months (around 4/28/2025) for CPAP compliance.      Please note portions of this record was created using voice recognition software. I have made every reasonable attempt to correct obvious errors, but I expect that there are errors of grammar and possibly content I did not discover before finalizing the note.

## 2025-04-22 ENCOUNTER — OFFICE VISIT (OUTPATIENT)
Dept: SLEEP MEDICINE | Facility: MEDICAL CENTER | Age: 65
End: 2025-04-22
Attending: STUDENT IN AN ORGANIZED HEALTH CARE EDUCATION/TRAINING PROGRAM
Payer: COMMERCIAL

## 2025-04-22 ENCOUNTER — RESULTS FOLLOW-UP (OUTPATIENT)
Dept: MEDICAL GROUP | Facility: MEDICAL CENTER | Age: 65
End: 2025-04-22

## 2025-04-22 ENCOUNTER — HOSPITAL ENCOUNTER (OUTPATIENT)
Facility: MEDICAL CENTER | Age: 65
End: 2025-04-22
Attending: FAMILY MEDICINE
Payer: COMMERCIAL

## 2025-04-22 VITALS
DIASTOLIC BLOOD PRESSURE: 80 MMHG | HEIGHT: 63 IN | WEIGHT: 187.9 LBS | SYSTOLIC BLOOD PRESSURE: 122 MMHG | OXYGEN SATURATION: 96 % | HEART RATE: 68 BPM | BODY MASS INDEX: 33.29 KG/M2 | RESPIRATION RATE: 16 BRPM

## 2025-04-22 DIAGNOSIS — E11.69 TYPE 2 DIABETES MELLITUS WITH OTHER SPECIFIED COMPLICATION, WITHOUT LONG-TERM CURRENT USE OF INSULIN (HCC): ICD-10-CM

## 2025-04-22 DIAGNOSIS — E78.5 DYSLIPIDEMIA ASSOCIATED WITH TYPE 2 DIABETES MELLITUS (HCC): ICD-10-CM

## 2025-04-22 DIAGNOSIS — G47.33 OSA (OBSTRUCTIVE SLEEP APNEA): ICD-10-CM

## 2025-04-22 DIAGNOSIS — E11.69 DYSLIPIDEMIA ASSOCIATED WITH TYPE 2 DIABETES MELLITUS (HCC): ICD-10-CM

## 2025-04-22 LAB
ALBUMIN SERPL BCP-MCNC: 4.1 G/DL (ref 3.2–4.9)
ALBUMIN/GLOB SERPL: 1.4 G/DL
ALP SERPL-CCNC: 50 U/L (ref 30–99)
ALT SERPL-CCNC: 30 U/L (ref 2–50)
ANION GAP SERPL CALC-SCNC: 10 MMOL/L (ref 7–16)
AST SERPL-CCNC: 25 U/L (ref 12–45)
BILIRUB SERPL-MCNC: 1 MG/DL (ref 0.1–1.5)
BUN SERPL-MCNC: 12 MG/DL (ref 8–22)
CALCIUM ALBUM COR SERPL-MCNC: 8.7 MG/DL (ref 8.5–10.5)
CALCIUM SERPL-MCNC: 8.8 MG/DL (ref 8.5–10.5)
CHLORIDE SERPL-SCNC: 105 MMOL/L (ref 96–112)
CHOLEST SERPL-MCNC: 185 MG/DL (ref 100–199)
CO2 SERPL-SCNC: 23 MMOL/L (ref 20–33)
CREAT SERPL-MCNC: 0.82 MG/DL (ref 0.5–1.4)
EST. AVERAGE GLUCOSE BLD GHB EST-MCNC: 171 MG/DL
FASTING STATUS PATIENT QL REPORTED: NORMAL
GFR SERPLBLD CREATININE-BSD FMLA CKD-EPI: 96 ML/MIN/1.73 M 2
GLOBULIN SER CALC-MCNC: 2.9 G/DL (ref 1.9–3.5)
GLUCOSE SERPL-MCNC: 113 MG/DL (ref 65–99)
HBA1C MFR BLD: 7.6 % (ref 4–5.6)
HDLC SERPL-MCNC: 40 MG/DL
LDLC SERPL CALC-MCNC: 113 MG/DL
POTASSIUM SERPL-SCNC: 4.1 MMOL/L (ref 3.6–5.5)
PROT SERPL-MCNC: 7 G/DL (ref 6–8.2)
SODIUM SERPL-SCNC: 138 MMOL/L (ref 135–145)
TRIGL SERPL-MCNC: 158 MG/DL (ref 0–149)

## 2025-04-22 PROCEDURE — 83036 HEMOGLOBIN GLYCOSYLATED A1C: CPT

## 2025-04-22 PROCEDURE — 99214 OFFICE O/P EST MOD 30 MIN: CPT | Performed by: STUDENT IN AN ORGANIZED HEALTH CARE EDUCATION/TRAINING PROGRAM

## 2025-04-22 PROCEDURE — 80053 COMPREHEN METABOLIC PANEL: CPT

## 2025-04-22 PROCEDURE — 80061 LIPID PANEL: CPT

## 2025-04-22 PROCEDURE — 36415 COLL VENOUS BLD VENIPUNCTURE: CPT

## 2025-04-22 PROCEDURE — 3079F DIAST BP 80-89 MM HG: CPT | Performed by: STUDENT IN AN ORGANIZED HEALTH CARE EDUCATION/TRAINING PROGRAM

## 2025-04-22 PROCEDURE — 3074F SYST BP LT 130 MM HG: CPT | Performed by: STUDENT IN AN ORGANIZED HEALTH CARE EDUCATION/TRAINING PROGRAM

## 2025-04-22 NOTE — PROGRESS NOTES
[facility] Follow-up Visit    CC: JOSE follow up      HPI:  Cheikh Chacko is a 65 y.o.male (former smoker 1 ppd for 34 years quit on 2/10/2020)  with history of JOSE, hypertension, STEMI in 2/20/2020 status post angioplasty with coronary stent, hyperlipidemia who presents for sleep study follow-up.     Initial sxs: Endorses years of snoring, fragmented sleep.  No symptoms of RLS, parasomnia, narcolepsy    Machine: Safehis 10 ASV  Date: 2/20/2025-4/20/205  Settings: EPAP: 5-9, PS: 3/15  Time used: 17% usage >4h, 3 hours and 3 min  Mask: medium wide dreamwear mask  DME: [facility]  95th% Leak: 37.7  95th% pressure: 17/7.8 cmH2O  Residual AHI: 5.9    The mask keeps falling off. He is unable to wear it throughout the night due to improper mask fitting  Otherwise pressure feels okay    Aerophagia: no   Residual snoring: no  Dry mouth: sometimes, but now improved with chinstrap  Mask leak: yes   Skin irritation: no  Chin strap: yes       Sleep History  Titration PSG 12/11/2024 -elevated central respiratory events accounting for over 60% of total events being criteria for complex sleep apnea.  Given elevated central apneic events ASV was started at the end of the study.  Auto ASV EPAP 5-9 pressure support 3-15 recommended.     Diagnostic PSG -severe JOSE, AHI 98.7.  Severe nocturnal desaturation with roxanne 78%, 18.6 minutes of total sleep time less than 88%    Patient Active Problem List    Diagnosis Date Noted    History of tobacco abuse (Quit >6 mos ago) 10/20/2022    ST elevation (STEMI) myocardial infarction involving other coronary artery of anterior wall (HCC) 01/31/2022    Type 2 diabetes mellitus with other specified complication (HCC) 01/31/2022    Dyslipidemia associated with type 2 diabetes mellitus (HCC) 01/31/2022    S/P primary angioplasty with coronary stent 01/31/2022    JOSE (obstructive sleep apnea) 01/25/2019    Essential hypertension 06/22/2018    Smoking greater than 30 pack years 06/22/2018     Obesity (BMI 30-39.9) 2018       Past Medical History:   Diagnosis Date    Hyperlipidemia     Hypertension     Sleep apnea         Past Surgical History:   Procedure Laterality Date    DENTAL SURGERY      STENT PLACEMENT      CARDIAC       Family History   Problem Relation Age of Onset    Diabetes Sister     Diabetes Brother     Cancer Neg Hx     Heart Disease Neg Hx     Stroke Neg Hx     Alcohol/Drug Neg Hx        Social History     Socioeconomic History    Marital status:      Spouse name: Not on file    Number of children: Not on file    Years of education: Not on file    Highest education level: Not on file   Occupational History    Not on file   Tobacco Use    Smoking status: Former     Current packs/day: 0.00     Average packs/day: 1 pack/day for 34.0 years (34.0 ttl pk-yrs)     Types: Cigarettes     Start date: 2/10/1986     Quit date: 2/10/2020     Years since quittin.2    Smokeless tobacco: Never   Vaping Use    Vaping status: Never Used   Substance and Sexual Activity    Alcohol use: No    Drug use: No    Sexual activity: Yes     Partners: Female     Comment: two children, and two step children    Other Topics Concern    Not on file   Social History Narrative    Not on file     Social Drivers of Health     Financial Resource Strain: Not on file   Food Insecurity: Not on file   Transportation Needs: Not on file   Physical Activity: Not on file   Stress: Not on file   Social Connections: Unknown (2022)    Social Connection and Isolation Panel [NHANES]     Frequency of Communication with Friends and Family: Not on file     Frequency of Social Gatherings with Friends and Family: Not on file     Attends Restorationism Services: Not on file     Active Member of Clubs or Organizations: Not on file     Attends Club or Organization Meetings: Not on file     Marital Status:    Intimate Partner Violence: Not on file   Housing Stability: Not on file       Current Outpatient Medications  "  Medication Sig Dispense Refill    spironolactone (ALDACTONE) 25 MG Tab Take 1 Tablet by mouth every day. Please call [phone] and schedule follow up appointment for further refills. Thank you 100 Tablet 1    aspirin (ASA) 81 MG Chew Tab chewable tablet Chew 1 Tablet every day. 100 Tablet 4    atorvastatin (LIPITOR) 80 MG tablet Take 1 Tablet by mouth every day. 100 Tablet 4    carvedilol (COREG) 25 MG Tab Take 1 Tablet by mouth 2 times a day with meals. 180 Tablet 4    Empagliflozin (JARDIANCE) 10 MG Tab tablet Take 1 Tablet by mouth every day. 90 Tablet 4    sacubitril-valsartan (ENTRESTO)  MG Tab Take 1 Tablet by mouth 2 times a day. 180 Tablet 4     No current facility-administered medications for this visit.        ALLERGIES: Patient has no known allergies.    ROS  Constitutional: Denies fevers, Denies weight changes  Ears/Nose/Throat/Mouth: Denies nasal congestion or sore throat   Cardiovascular: Denies chest pain  Respiratory: Denies shortness of breath, Denies cough  Gastrointestinal/Hepatic: Denies nausea, vomiting  Sleep: see HPI      PHYSICAL EXAM  /80 (BP Location: Left arm, Patient Position: Sitting, BP Cuff Size: Adult)   Pulse 68   Resp 16   Ht 1.6 m (5' 3\")   Wt 85.2 kg (187 lb 14.4 oz)   SpO2 96%   BMI 33.28 kg/m²   Appearance: Well-nourished, well-developed, no acute distress  Eyes:  No scleral icterus , EOMI  Musculoskeletal:  Grossly normal; gait and station normal; digits and nails normal  Skin:  No rashes, petechiae, cyanosis  Neurologic: without focal signs; oriented to person, time, place, and purpose; judgement intact      Medical Decision Making   Assessment and Plan  65 y.o.male (former smoker 1 ppd for 34 years quit on 2/10/2020)  with history of JOSE, hypertension, STEMI in 2/20/2020 status post angioplasty with coronary stent, hyperlipidemia who presents for JOSE with [facility] follow up.     The medical record was reviewed.  Diagnostic and titration nocturnal " polysomnogram's, home sleep apnea tests, continuous nocturnal oximetry results, multiple sleep latency tests, and compliance reports reviewed.  Compliance data reviewed showing 17% usage > 4hours in last 30 days. Average AHI 5.9 events/hour. Notable large mask leak, patient notes mask falling off during the middle of the night as he turns at night. Suspect headgear not properly fitted. Patient is very motivated to increase usage once his mask is fitting.    Current Settings ASV EPAP 5-9, PS: 3/15 cmH2O    PLAN:   -Order placed for mask and supplies  -DME mask fit ordered  -highly encourage increasing usage once mask properly fitted  -Advised to reach out via MyChart with questions     Has been advised to continue the current settings, clean equipment frequently, and get new mask and supplies as allowed by insurance and DME. Recommend an earlier appointment, if significant treatment barriers develop.    Patients with JOSE are at increased risk of cardiovascular disease including coronary artery disease, systemic arterial hypertension, pulmonary arterial hypertension, cardiac arrythmias, and stroke. The patient was advised to avoid driving a motor vehicle when drowsy.    Positive airway pressure will favorably impact many of the adverse conditions and effects provoked by JOSE.    Have advised the patient to follow up with the appropriate healthcare practitioners for all other medical problems and issues.    Return in about 3 months (around 7/22/2025) for CPAP compliance.      Please note portions of this record was created using voice recognition software. I have made every reasonable attempt to correct obvious errors, but I expect that there are errors of grammar and possibly content I did not discover before finalizing the note.

## 2025-05-29 ENCOUNTER — OFFICE VISIT (OUTPATIENT)
Dept: MEDICAL GROUP | Facility: MEDICAL CENTER | Age: 65
End: 2025-05-29
Payer: COMMERCIAL

## 2025-05-29 ENCOUNTER — HOSPITAL ENCOUNTER (OUTPATIENT)
Facility: MEDICAL CENTER | Age: 65
End: 2025-05-29
Attending: FAMILY MEDICINE
Payer: COMMERCIAL

## 2025-05-29 VITALS
SYSTOLIC BLOOD PRESSURE: 120 MMHG | HEART RATE: 75 BPM | WEIGHT: 187.61 LBS | DIASTOLIC BLOOD PRESSURE: 74 MMHG | HEIGHT: 64 IN | TEMPERATURE: 97.6 F | OXYGEN SATURATION: 97 % | BODY MASS INDEX: 32.03 KG/M2

## 2025-05-29 DIAGNOSIS — E66.9 OBESITY (BMI 30-39.9): ICD-10-CM

## 2025-05-29 DIAGNOSIS — E11.69 TYPE 2 DIABETES MELLITUS WITH OTHER SPECIFIED COMPLICATION, WITHOUT LONG-TERM CURRENT USE OF INSULIN (HCC): Primary | ICD-10-CM

## 2025-05-29 DIAGNOSIS — E11.69 DYSLIPIDEMIA ASSOCIATED WITH TYPE 2 DIABETES MELLITUS (HCC): ICD-10-CM

## 2025-05-29 DIAGNOSIS — E78.5 DYSLIPIDEMIA ASSOCIATED WITH TYPE 2 DIABETES MELLITUS (HCC): ICD-10-CM

## 2025-05-29 DIAGNOSIS — B35.1 ONYCHOMYCOSIS: ICD-10-CM

## 2025-05-29 DIAGNOSIS — E11.69 TYPE 2 DIABETES MELLITUS WITH OTHER SPECIFIED COMPLICATION, WITHOUT LONG-TERM CURRENT USE OF INSULIN (HCC): ICD-10-CM

## 2025-05-29 DIAGNOSIS — H91.93 BILATERAL HEARING LOSS, UNSPECIFIED HEARING LOSS TYPE: ICD-10-CM

## 2025-05-29 LAB
CREAT UR-MCNC: 93.4 MG/DL
MICROALBUMIN UR-MCNC: 1.2 MG/DL
MICROALBUMIN/CREAT UR: 13 MG/G (ref 0–30)

## 2025-05-29 PROCEDURE — 82570 ASSAY OF URINE CREATININE: CPT

## 2025-05-29 PROCEDURE — 82043 UR ALBUMIN QUANTITATIVE: CPT

## 2025-05-29 RX ORDER — ROSUVASTATIN CALCIUM 40 MG/1
40 TABLET, COATED ORAL DAILY
Qty: 90 TABLET | Refills: 3 | Status: SHIPPED | OUTPATIENT
Start: 2025-05-29

## 2025-05-29 RX ORDER — CICLOPIROX 80 MG/ML
1 SOLUTION TOPICAL NIGHTLY
Qty: 6.6 ML | Refills: 3 | Status: SHIPPED | OUTPATIENT
Start: 2025-05-29

## 2025-05-29 ASSESSMENT — ENCOUNTER SYMPTOMS
CHILLS: 0
FEVER: 0

## 2025-05-29 ASSESSMENT — PATIENT HEALTH QUESTIONNAIRE - PHQ9: CLINICAL INTERPRETATION OF PHQ2 SCORE: 0

## 2025-05-29 NOTE — LETTER
May 29, 2025       Patient: Cheikh Chacko   YOB: 1960   Date of Visit: 5/29/2025         To Whom It May Concern:    In my medical opinion, I recommend that Cheikh Chacko wear ear protection to prevent hearing loss worsening.    If you have any questions or concerns, please don't hesitate to call [phone]          Sincerely,          Davidson Cervantes M.D.  Electronically Signed

## 2025-05-29 NOTE — PROGRESS NOTES
Verbal consent was acquired by the patient to use [employer] ambient listening note generation during this visit.      Cheikh was seen today for follow-up.    Diagnoses and all orders for this visit:    Type 2 diabetes mellitus with other specified complication, without long-term current use of insulin (HCC)  -     POCT Retinal Eye Exam  -     Cancel: MICROALBUMIN CREAT RATIO URINE; Future  -     Diabetic Monofilament Lower Extremity Exam  -     Empagliflozin 25 MG Tab; Take 1 Tablet by mouth every day.  -     Comp Metabolic Panel; Future  -     HEMOGLOBIN A1C; Future  -     VITAMIN B12; Future  -     MICROALBUMIN CREAT RATIO URINE; Future    Dyslipidemia associated with type 2 diabetes mellitus (HCC)  -     rosuvastatin (CRESTOR) 40 MG tablet; Take 1 Tablet by mouth every day.  -     Lipid Profile; Future    Obesity (BMI 30-39.9)  -     Patient identified as having weight management issue.  Appropriate orders and counseling given.    Onychomycosis  -     ciclopirox (PENLAC) 8 % solution; Apply 1 Application topically every evening.    Bilateral hearing loss, unspecified hearing loss type                  Assessment & Plan  1. Type 2 Diabetes Mellitus:  Chronic condition, unstable  - Fasting blood glucose levels improved from 140 to 113, but HbA1c increased from 6.9 to 7.6  - Physical exam: no numbness or tingling in feet; dryness and fungal infection noted on feet  - Advised to avoid sweets, exercise regularly, and reduce weight to manage diabetes  - Jardiance dosage increased to 25 mg  - Retinal exam, urine test, and foot exam to be conducted today    2. Hypercholesterolemia  Chronic condition, unstable  - Elevated cholesterol levels, likely due to inconsistent use of atorvastatin  - Reports side effects from atorvastatin 80 mg, including morning weakness  - Advised to switch to Crestor 40 mg to manage cholesterol levels and reduce heart attack risk  - Labs to be rechecked in 3 to 4 months to monitor cholesterol  levels    3. Onychomycosis  Chronic condition, unstable  - Persistent fungal infection in toenails  - Physical exam: dryness and fungal infection on feet  - Medication ciclopirox prescribed to treat fungal infection, to be applied daily at night  - Treatment may take 6 to 9 months to show improvement    4. Hearing Loss  Chronic condition, unstable  - Reports mild hearing loss and noise exposure at work  - Advised to continue using ear protection to prevent further hearing loss  - A letter will be provided for his employer recommending the use of ear protection.    5. Obesity:  Chronic condition, unstable, continue to work on diet and exercise for weight loss.      Follow up in 3 to 4 months.    Chief complaint::The primary encounter diagnosis was Type 2 diabetes mellitus with other specified complication, without long-term current use of insulin (HCC). Diagnoses of Dyslipidemia associated with type 2 diabetes mellitus (HCC), Obesity (BMI 30-39.9), Onychomycosis, and Bilateral hearing loss, unspecified hearing loss type were also pertinent to this visit.      History of Present Illness  The patient is a 65-year-old male who presents for a blood test follow-up and to address care gaps related to his diabetes.    Diabetes management  - The patient is uncertain about the date of his last ophthalmological examination but believes it may have been conducted in 05/2024.  - He reports no peripheral neuropathy symptoms such as numbness or tingling in his feet.  - His weight has remained stable since his last visit.  - Recent blood test results indicate that his fasting sugar levels have improved from 140 to 113, but his A1c has increased from 6.9 to 7.6.  - His cholesterol levels are elevated, which he attributes to occasional consumption of atorvastatin and sweets.    Onychomycosis  - The patient has been experiencing persistent onychomycosis, which has not responded to various treatments.  - He has not sought podiatric  "consultation for this issue.    Auditory concerns  - The patient has been utilizing ear protection at his workplace due to excessive noise levels.  - He has been requested by his employer to discontinue this practice unless he can provide a medical justification.  - He reports a slight decrease in auditory acuity.    Supplemental information: None provided.      Review of Systems   Constitutional:  Negative for chills and fever.   Skin:         Big toenail discoloration          Medications and Allergies:       Current Outpatient Medications:     rosuvastatin, 40 mg, Oral, DAILY, Taking    Empagliflozin, 1 Tablet, Oral, DAILY, Taking    ciclopirox, 1 Application, Topical, Nightly, Taking    spironolactone, 25 mg, Oral, DAILY, Taking    aspirin, 81 mg, Oral, DAILY, Taking    carvedilol, 25 mg, Oral, BID WITH MEALS, Taking    Entresto, 1 Tablet, Oral, BID, Taking     /74 (BP Location: Left arm, Patient Position: Sitting, BP Cuff Size: Adult)   Pulse 75   Temp 36.4 °C (97.6 °F) (Temporal)   Ht 1.613 m (5' 3.5\")   Wt 85.1 kg (187 lb 9.8 oz)   SpO2 97% , Body mass index is 32.71 kg/m².      Physical Exam  Constitutional:       Appearance: Normal appearance. He is well-developed and well-groomed.   HENT:      Head: Normocephalic and atraumatic.      Right Ear: External ear normal.      Left Ear: External ear normal.   Eyes:      General:         Right eye: No discharge.         Left eye: No discharge.      Conjunctiva/sclera: Conjunctivae normal.   Cardiovascular:      Rate and Rhythm: Normal rate.   Pulmonary:      Effort: Pulmonary effort is normal. No respiratory distress.   Musculoskeletal:      Cervical back: Neck supple.   Skin:     Findings: No rash.   Neurological:      Mental Status: He is alert.   Psychiatric:         Mood and Affect: Mood and affect normal.         Behavior: Behavior normal.          Monofilament testing with a 10 gram force: sensation intact: intact bilaterally  Visual Inspection: " Feet without maceration, ulcers, fissures.  Pedal pulses: intact bilaterally     I reviewed with patient lab results resulted on 4/22/2025.        Please note that this dictation was created using voice recognition software. I have made every reasonable attempt to correct obvious errors, but I expect that there are errors of grammar and possibly content that I did not discover before finalizing the note.

## 2025-05-30 ENCOUNTER — RESULTS FOLLOW-UP (OUTPATIENT)
Dept: MEDICAL GROUP | Facility: MEDICAL CENTER | Age: 65
End: 2025-05-30

## 2025-06-05 ENCOUNTER — RESULTS FOLLOW-UP (OUTPATIENT)
Dept: MEDICAL GROUP | Facility: MEDICAL CENTER | Age: 65
End: 2025-06-05

## 2025-06-11 ENCOUNTER — TELEPHONE (OUTPATIENT)
Dept: MEDICAL GROUP | Facility: MEDICAL CENTER | Age: 65
End: 2025-06-11
Payer: COMMERCIAL

## 2025-06-11 NOTE — TELEPHONE ENCOUNTER
Received message from pt: [place] just wondering what kind of pills should I bring with me for diarrhea,flu, pain killer,etc please help.    Left voice message asking for call back.
